# Patient Record
Sex: MALE | Race: WHITE | NOT HISPANIC OR LATINO | URBAN - METROPOLITAN AREA
[De-identification: names, ages, dates, MRNs, and addresses within clinical notes are randomized per-mention and may not be internally consistent; named-entity substitution may affect disease eponyms.]

---

## 2017-08-18 ENCOUNTER — INPATIENT (INPATIENT)
Facility: HOSPITAL | Age: 67
LOS: 1 days | Discharge: HOME | DRG: 101 | End: 2017-08-20
Attending: PSYCHIATRY & NEUROLOGY | Admitting: PSYCHIATRY & NEUROLOGY
Payer: MEDICARE

## 2017-08-18 VITALS
SYSTOLIC BLOOD PRESSURE: 194 MMHG | HEART RATE: 86 BPM | OXYGEN SATURATION: 89 % | RESPIRATION RATE: 20 BRPM | DIASTOLIC BLOOD PRESSURE: 100 MMHG

## 2017-08-18 DIAGNOSIS — G40.209 LOCALIZATION-RELATED (FOCAL) (PARTIAL) SYMPTOMATIC EPILEPSY AND EPILEPTIC SYNDROMES WITH COMPLEX PARTIAL SEIZURES, NOT INTRACTABLE, WITHOUT STATUS EPILEPTICUS: ICD-10-CM

## 2017-08-18 DIAGNOSIS — E78.4 OTHER HYPERLIPIDEMIA: ICD-10-CM

## 2017-08-18 DIAGNOSIS — I10 ESSENTIAL (PRIMARY) HYPERTENSION: ICD-10-CM

## 2017-08-18 DIAGNOSIS — R56.9 UNSPECIFIED CONVULSIONS: ICD-10-CM

## 2017-08-18 DIAGNOSIS — Z98.890 OTHER SPECIFIED POSTPROCEDURAL STATES: Chronic | ICD-10-CM

## 2017-08-18 LAB
ALBUMIN SERPL ELPH-MCNC: 4.4 G/DL — SIGNIFICANT CHANGE UP (ref 3.3–5)
ALP SERPL-CCNC: 51 U/L — SIGNIFICANT CHANGE UP (ref 40–120)
ALT FLD-CCNC: 19 U/L RC — SIGNIFICANT CHANGE UP (ref 10–45)
ANION GAP SERPL CALC-SCNC: 14 MMOL/L — SIGNIFICANT CHANGE UP (ref 5–17)
APPEARANCE UR: CLEAR — SIGNIFICANT CHANGE UP
APTT BLD: 25.9 SEC — LOW (ref 27.5–37.4)
AST SERPL-CCNC: 23 U/L — SIGNIFICANT CHANGE UP (ref 10–40)
BACTERIA # UR AUTO: NEGATIVE /HPF — SIGNIFICANT CHANGE UP
BASOPHILS # BLD AUTO: 0.1 K/UL — SIGNIFICANT CHANGE UP (ref 0–0.2)
BASOPHILS NFR BLD AUTO: 0.7 % — SIGNIFICANT CHANGE UP (ref 0–2)
BILIRUB SERPL-MCNC: 0.5 MG/DL — SIGNIFICANT CHANGE UP (ref 0.2–1.2)
BILIRUB UR-MCNC: NEGATIVE — SIGNIFICANT CHANGE UP
BLD GP AB SCN SERPL QL: NEGATIVE — SIGNIFICANT CHANGE UP
BUN SERPL-MCNC: 11 MG/DL — SIGNIFICANT CHANGE UP (ref 7–23)
CALCIUM SERPL-MCNC: 8.7 MG/DL — SIGNIFICANT CHANGE UP (ref 8.4–10.5)
CHLORIDE SERPL-SCNC: 101 MMOL/L — SIGNIFICANT CHANGE UP (ref 96–108)
CO2 SERPL-SCNC: 28 MMOL/L — SIGNIFICANT CHANGE UP (ref 22–31)
COLOR SPEC: SIGNIFICANT CHANGE UP
CREAT SERPL-MCNC: 1.01 MG/DL — SIGNIFICANT CHANGE UP (ref 0.5–1.3)
DIFF PNL FLD: ABNORMAL
EOSINOPHIL # BLD AUTO: 0 K/UL — SIGNIFICANT CHANGE UP (ref 0–0.5)
EOSINOPHIL NFR BLD AUTO: 0.3 % — SIGNIFICANT CHANGE UP (ref 0–6)
GAS PNL BLDV: SIGNIFICANT CHANGE UP
GLUCOSE SERPL-MCNC: 113 MG/DL — HIGH (ref 70–99)
GLUCOSE UR QL: NEGATIVE — SIGNIFICANT CHANGE UP
HCT VFR BLD CALC: 46.4 % — SIGNIFICANT CHANGE UP (ref 39–50)
HGB BLD-MCNC: 15.6 G/DL — SIGNIFICANT CHANGE UP (ref 13–17)
INR BLD: 1.01 RATIO — SIGNIFICANT CHANGE UP (ref 0.88–1.16)
KETONES UR-MCNC: NEGATIVE — SIGNIFICANT CHANGE UP
LEUKOCYTE ESTERASE UR-ACNC: NEGATIVE — SIGNIFICANT CHANGE UP
LYMPHOCYTES # BLD AUTO: 4.6 K/UL — HIGH (ref 1–3.3)
LYMPHOCYTES # BLD AUTO: 42.6 % — SIGNIFICANT CHANGE UP (ref 13–44)
MAGNESIUM SERPL-MCNC: 2 MG/DL — SIGNIFICANT CHANGE UP (ref 1.6–2.6)
MCHC RBC-ENTMCNC: 32.2 PG — SIGNIFICANT CHANGE UP (ref 27–34)
MCHC RBC-ENTMCNC: 33.7 GM/DL — SIGNIFICANT CHANGE UP (ref 32–36)
MCV RBC AUTO: 95.6 FL — SIGNIFICANT CHANGE UP (ref 80–100)
MONOCYTES # BLD AUTO: 0.9 K/UL — SIGNIFICANT CHANGE UP (ref 0–0.9)
MONOCYTES NFR BLD AUTO: 8.3 % — SIGNIFICANT CHANGE UP (ref 2–14)
NEUTROPHILS # BLD AUTO: 5.2 K/UL — SIGNIFICANT CHANGE UP (ref 1.8–7.4)
NEUTROPHILS NFR BLD AUTO: 48.1 % — SIGNIFICANT CHANGE UP (ref 43–77)
NITRITE UR-MCNC: NEGATIVE — SIGNIFICANT CHANGE UP
PH UR: 7 — SIGNIFICANT CHANGE UP (ref 5–8)
PHOSPHATE SERPL-MCNC: 3.7 MG/DL — SIGNIFICANT CHANGE UP (ref 2.5–4.5)
PLATELET # BLD AUTO: 165 K/UL — SIGNIFICANT CHANGE UP (ref 150–400)
POTASSIUM SERPL-MCNC: 4.3 MMOL/L — SIGNIFICANT CHANGE UP (ref 3.5–5.3)
POTASSIUM SERPL-SCNC: 4.3 MMOL/L — SIGNIFICANT CHANGE UP (ref 3.5–5.3)
PROT SERPL-MCNC: 7.2 G/DL — SIGNIFICANT CHANGE UP (ref 6–8.3)
PROT UR-MCNC: 30 MG/DL
PROTHROM AB SERPL-ACNC: 11 SEC — SIGNIFICANT CHANGE UP (ref 9.8–12.7)
RBC # BLD: 4.85 M/UL — SIGNIFICANT CHANGE UP (ref 4.2–5.8)
RBC # FLD: 12 % — SIGNIFICANT CHANGE UP (ref 10.3–14.5)
RBC CASTS # UR COMP ASSIST: SIGNIFICANT CHANGE UP /HPF (ref 0–2)
RH IG SCN BLD-IMP: POSITIVE — SIGNIFICANT CHANGE UP
SODIUM SERPL-SCNC: 143 MMOL/L — SIGNIFICANT CHANGE UP (ref 135–145)
SP GR SPEC: 1.01 — LOW (ref 1.01–1.02)
UROBILINOGEN FLD QL: NEGATIVE — SIGNIFICANT CHANGE UP
VALPROATE SERPL-MCNC: 79 UG/ML — SIGNIFICANT CHANGE UP (ref 50–100)
WBC # BLD: 10.7 K/UL — HIGH (ref 3.8–10.5)
WBC # FLD AUTO: 10.7 K/UL — HIGH (ref 3.8–10.5)
WBC UR QL: SIGNIFICANT CHANGE UP /HPF (ref 0–5)

## 2017-08-18 PROCEDURE — 99284 EMERGENCY DEPT VISIT MOD MDM: CPT | Mod: GC

## 2017-08-18 PROCEDURE — 71010: CPT | Mod: 26

## 2017-08-18 PROCEDURE — 70450 CT HEAD/BRAIN W/O DYE: CPT | Mod: 26

## 2017-08-18 RX ORDER — DIVALPROEX SODIUM 500 MG/1
750 TABLET, DELAYED RELEASE ORAL AT BEDTIME
Qty: 0 | Refills: 0 | Status: DISCONTINUED | OUTPATIENT
Start: 2017-08-18 | End: 2017-08-20

## 2017-08-18 RX ORDER — SENNA PLUS 8.6 MG/1
2 TABLET ORAL AT BEDTIME
Qty: 0 | Refills: 0 | Status: DISCONTINUED | OUTPATIENT
Start: 2017-08-18 | End: 2017-08-20

## 2017-08-18 RX ORDER — ONDANSETRON 8 MG/1
4 TABLET, FILM COATED ORAL ONCE
Qty: 0 | Refills: 0 | Status: COMPLETED | OUTPATIENT
Start: 2017-08-18 | End: 2017-08-18

## 2017-08-18 RX ORDER — ENOXAPARIN SODIUM 100 MG/ML
40 INJECTION SUBCUTANEOUS EVERY 24 HOURS
Qty: 0 | Refills: 0 | Status: DISCONTINUED | OUTPATIENT
Start: 2017-08-18 | End: 2017-08-20

## 2017-08-18 RX ORDER — LEVETIRACETAM 250 MG/1
1000 TABLET, FILM COATED ORAL ONCE
Qty: 0 | Refills: 0 | Status: COMPLETED | OUTPATIENT
Start: 2017-08-18 | End: 2017-08-18

## 2017-08-18 RX ORDER — FINASTERIDE 5 MG/1
5 TABLET, FILM COATED ORAL DAILY
Qty: 0 | Refills: 0 | Status: DISCONTINUED | OUTPATIENT
Start: 2017-08-18 | End: 2017-08-20

## 2017-08-18 RX ORDER — LEVETIRACETAM 250 MG/1
1000 TABLET, FILM COATED ORAL
Qty: 0 | Refills: 0 | Status: DISCONTINUED | OUTPATIENT
Start: 2017-08-18 | End: 2017-08-20

## 2017-08-18 RX ORDER — LOSARTAN POTASSIUM 100 MG/1
50 TABLET, FILM COATED ORAL DAILY
Qty: 0 | Refills: 0 | Status: DISCONTINUED | OUTPATIENT
Start: 2017-08-18 | End: 2017-08-20

## 2017-08-18 RX ORDER — DIVALPROEX SODIUM 500 MG/1
500 TABLET, DELAYED RELEASE ORAL DAILY
Qty: 0 | Refills: 0 | Status: DISCONTINUED | OUTPATIENT
Start: 2017-08-18 | End: 2017-08-20

## 2017-08-18 RX ORDER — ONDANSETRON 8 MG/1
4 TABLET, FILM COATED ORAL EVERY 6 HOURS
Qty: 0 | Refills: 0 | Status: DISCONTINUED | OUTPATIENT
Start: 2017-08-18 | End: 2017-08-20

## 2017-08-18 RX ORDER — SIMVASTATIN 20 MG/1
10 TABLET, FILM COATED ORAL AT BEDTIME
Qty: 0 | Refills: 0 | Status: DISCONTINUED | OUTPATIENT
Start: 2017-08-18 | End: 2017-08-20

## 2017-08-18 RX ORDER — DOCUSATE SODIUM 100 MG
100 CAPSULE ORAL THREE TIMES A DAY
Qty: 0 | Refills: 0 | Status: DISCONTINUED | OUTPATIENT
Start: 2017-08-18 | End: 2017-08-20

## 2017-08-18 RX ORDER — ACETAMINOPHEN 500 MG
1000 TABLET ORAL ONCE
Qty: 0 | Refills: 0 | Status: COMPLETED | OUTPATIENT
Start: 2017-08-18 | End: 2017-08-18

## 2017-08-18 RX ORDER — DIVALPROEX SODIUM 500 MG/1
1 TABLET, DELAYED RELEASE ORAL
Qty: 0 | Refills: 0 | COMMUNITY

## 2017-08-18 RX ORDER — IBUPROFEN 200 MG
400 TABLET ORAL EVERY 6 HOURS
Qty: 0 | Refills: 0 | Status: DISCONTINUED | OUTPATIENT
Start: 2017-08-18 | End: 2017-08-20

## 2017-08-18 RX ORDER — ESCITALOPRAM OXALATE 10 MG/1
10 TABLET, FILM COATED ORAL DAILY
Qty: 0 | Refills: 0 | Status: DISCONTINUED | OUTPATIENT
Start: 2017-08-18 | End: 2017-08-19

## 2017-08-18 RX ADMIN — LEVETIRACETAM 1000 MILLIGRAM(S): 250 TABLET, FILM COATED ORAL at 22:02

## 2017-08-18 RX ADMIN — LEVETIRACETAM 400 MILLIGRAM(S): 250 TABLET, FILM COATED ORAL at 11:15

## 2017-08-18 RX ADMIN — Medication 400 MILLIGRAM(S): at 14:21

## 2017-08-18 RX ADMIN — DIVALPROEX SODIUM 750 MILLIGRAM(S): 500 TABLET, DELAYED RELEASE ORAL at 22:02

## 2017-08-18 RX ADMIN — SIMVASTATIN 10 MILLIGRAM(S): 20 TABLET, FILM COATED ORAL at 22:03

## 2017-08-18 RX ADMIN — Medication 100 MILLIGRAM(S): at 22:00

## 2017-08-18 RX ADMIN — ENOXAPARIN SODIUM 40 MILLIGRAM(S): 100 INJECTION SUBCUTANEOUS at 21:59

## 2017-08-18 RX ADMIN — ONDANSETRON 4 MILLIGRAM(S): 8 TABLET, FILM COATED ORAL at 11:36

## 2017-08-18 NOTE — ED PROVIDER NOTE - ATTENDING CONTRIBUTION TO CARE
Patient with history of resection of brain tumor, history of seizures with resultant aphasia and confusion presenting with episode of confusion and aphasia beginning suddenly earlier today.  Neurology [facility].  On exam patient able to follow simple commands, aphasic.   Hypertensive but otherwise vital signs within normal limits.  Likely recurrence of his prior atypical seizures, however given focal neurologic signs within tPA window, code stroke activated.  Patient admitted to neurology for further care.

## 2017-08-18 NOTE — H&P ADULT - PROBLEM SELECTOR PROBLEM 2
Partial symptomatic epilepsy with complex partial seizures, not intractable, without status epilepticus

## 2017-08-18 NOTE — H&P ADULT - NSHPLABSRESULTS_GEN_ALL_CORE
< from: CT Head No Cont (08.18.17 @ 11:32) >    INTERPRETATION:    Clinical Indication: Stroke code, history of cavernoma with seizures   consisting of periodic right-sided weakness    5mm axial sections of the brain were obtained from base to vertex,   without the intravenous administration of contrast material. Coronal and   sagittal computer generated reconstructed views are available.    No prior brain imaging is available for comparison.    There has been a left frontal temporal craniotomy. There is   encephalomalacia in the left temporal lobe. The ventricles are enlarged   to a moderate degree and may be somewhat out of proportion to the degree   of sulcal prominence. No hemorrhage or mass is identified.    Impression: Previous left frontal craniotomy with left temporal and   encephalomalacia. Large ventricles. No acute hemorrhage or mass.      < end of copied text >

## 2017-08-18 NOTE — ED PROVIDER NOTE - OBJECTIVE STATEMENT
66yo male p/w altered mental status. Pt. has history of meningioma, s/p; resection 2014 on seizure ppx. 2015 pt. had grand mal seizure after medication noncompliance. Pt. had Torrey's events. Today, while in kitchen pt. started having aphasia, was given lorazepam, 1mg. Patient now with persistent aphasia, confusion. Deny fevers, nausea, trauma, cough, sick contacts, recent travel. Pt. reports vomiting this AM. PMD: Dr. Joel Goldberg Neuro: Kyle

## 2017-08-18 NOTE — ED ADULT NURSE NOTE - OBJECTIVE STATEMENT
Pt is a 66 yo male BIBA sp AMS since 9 am. Pts wife states he has a hx of meningioma s/p resection 2014 on seizures. Pt had grand mal seizure  after being non-compliant on meds. Pts wife reports this is his usual self after having a seizure, he typically has seizures once a year. Pt was given 1 mg of lorazepam at home after becoming aphasic. Pt has persistent aphasia, confusion, He is unable to answer any questions.

## 2017-08-18 NOTE — H&P ADULT - HISTORY OF PRESENT ILLNESS
Pt is a 66 yo right-handed male with a PMHx of cavernoma, s/p removal in 2014 with resultant epilepsy who presents w/ altered mental status. Pt is currently non-verbal with weakness of the right side, inability to express himself and urinary incontinence during this episode.Earlier this morning, sudden-onset of aphasia and was given lorazepam 1mg but still had a persistent expressive aphasia with confusion. Pt's wife reports that is typical for him to have seizure episodes that consist of confusion and hemiparesis on the right side that resolve shortly during the post-ictal period.   He has had 4 seizures since diagnosed with his cavernoma in 2014 with his last seizure being in October. He is currently compliant with his home medications which consist of Keppra 1000/1000 and Depakote 500/750.

## 2017-08-18 NOTE — H&P ADULT - NSHPPHYSICALEXAM_GEN_ALL_CORE
Physical exam:   General: in no acute distress  Mental Status: Alert, predominantly expressive aphasia but able to follow commands, not able to stare orientation  CN: bilateral blink to threat, right pupil 1.5mm, left pupil 2 mm and reactive, partial gaze preference to right, No facial asymmetries   Motor: Normal bulk and tone. No adventitious movements or abnormal posturing. Full strength on the left side but 1/5 on the RLE, and 3/5 on RLE without drift  Sensation: Grimace to pain in all 4 extremities

## 2017-08-18 NOTE — H&P ADULT - ASSESSMENT
67 year old male with seizure s/p cavernoma resected previously well-controlled on keppra and depakote with episode of aphasia and right-sided weakness earlier today similar to prior seizures who has still not returned to baseline and who does not appear to have an infection.

## 2017-08-18 NOTE — H&P ADULT - PMH
Cavernoma    Essential hypertension    Other hyperlipidemia    Partial symptomatic epilepsy with complex partial seizures, not intractable, without status epilepticus

## 2017-08-18 NOTE — H&P ADULT - PROBLEM SELECTOR PLAN 1
Admit to neurology with Q4 hour neuro checks.  Obtain dysphagia screen prior to feeding.  MRI brain without contrast.  Continue keppra and depakote. Will give IV if unable to tolerate PO.

## 2017-08-19 DIAGNOSIS — D18.01 HEMANGIOMA OF SKIN AND SUBCUTANEOUS TISSUE: ICD-10-CM

## 2017-08-19 PROCEDURE — 70551 MRI BRAIN STEM W/O DYE: CPT | Mod: 26

## 2017-08-19 RX ADMIN — Medication 100 MILLIGRAM(S): at 15:40

## 2017-08-19 RX ADMIN — DIVALPROEX SODIUM 500 MILLIGRAM(S): 500 TABLET, DELAYED RELEASE ORAL at 10:00

## 2017-08-19 RX ADMIN — LEVETIRACETAM 1000 MILLIGRAM(S): 250 TABLET, FILM COATED ORAL at 10:00

## 2017-08-19 RX ADMIN — LEVETIRACETAM 1000 MILLIGRAM(S): 250 TABLET, FILM COATED ORAL at 21:42

## 2017-08-19 RX ADMIN — ENOXAPARIN SODIUM 40 MILLIGRAM(S): 100 INJECTION SUBCUTANEOUS at 21:38

## 2017-08-19 RX ADMIN — Medication 100 MILLIGRAM(S): at 10:00

## 2017-08-19 RX ADMIN — Medication 1 MILLIGRAM(S): at 13:42

## 2017-08-19 RX ADMIN — DIVALPROEX SODIUM 750 MILLIGRAM(S): 500 TABLET, DELAYED RELEASE ORAL at 21:42

## 2017-08-19 RX ADMIN — FINASTERIDE 5 MILLIGRAM(S): 5 TABLET, FILM COATED ORAL at 10:00

## 2017-08-19 RX ADMIN — Medication 100 MILLIGRAM(S): at 21:38

## 2017-08-19 RX ADMIN — LOSARTAN POTASSIUM 50 MILLIGRAM(S): 100 TABLET, FILM COATED ORAL at 10:00

## 2017-08-19 RX ADMIN — SIMVASTATIN 10 MILLIGRAM(S): 20 TABLET, FILM COATED ORAL at 21:39

## 2017-08-19 NOTE — CONSULT NOTE ADULT - SUBJECTIVE AND OBJECTIVE BOX
HPI:  Pt is a 66 yo right-handed male with a PMHx of cavernoma, s/p removal in  with resultant epilepsy who presents w/ altered mental status. Pt is currently minimally verbal with weakness of the right side, inability to express himself and urinary incontinence during this episode. Earlier yesterday morning, sudden-onset of aphasia and was given lorazepam 1mg but still had a persistent expressive aphasia with confusion. Pt's wife reports that is typical for him to have seizure episodes that consist of confusion and hemiparesis on the right side that resolve shortly during the post-ictal period.   He has had 4 seizures since diagnosed with his cavernoma in  with his last seizure being in October. He is currently compliant with his home medications which consist of Keppra 1000/1000 and Depakote 500/750. (18 Aug 2017 13:59)  per wife pt has not had fever , chills, chest pain, sob, nausea, vomiting, abd pain, diarrhea or dysuria.      PAST MEDICAL & SURGICAL HISTORY:  Partial symptomatic epilepsy with complex partial seizures, not intractable, without status epilepticus  Other hyperlipidemia  Essential hypertension  Cavernoma  Status post craniectomy      Review of Systems:   CONSTITUTIONAL: No fever, weight loss, or fatigue  EYES: No eye pain, visual disturbances, or discharge  ENMT:  No difficulty hearing, tinnitus, vertigo; No sinus or throat pain  NECK: No pain or stiffness  BREASTS: No pain, masses, or nipple discharge  RESPIRATORY: No cough, wheezing, chills or hemoptysis; No shortness of breath  CARDIOVASCULAR: No chest pain, palpitations, dizziness, or leg swelling  GASTROINTESTINAL: No abdominal or epigastric pain. No nausea, vomiting, or hematemesis; No diarrhea or constipation. No melena or hematochezia.  GENITOURINARY: No dysuria, frequency, hematuria, or incontinence  NEUROLOGICAL: No headaches, memory loss, loss of strength, numbness, or tremors  SKIN: No itching, burning, rashes, or lesions   LYMPH NODES: No enlarged glands  ENDOCRINE: No heat or cold intolerance; No hair loss  MUSCULOSKELETAL: No joint pain or swelling; No muscle, back, or extremity pain  PSYCHIATRIC: No depression, anxiety, mood swings, or difficulty sleeping  HEME/LYMPH: No easy bruising, or bleeding gums  ALLERY AND IMMUNOLOGIC: No hives or eczema    Allergies    No Known Allergies    Intolerances        Social History:     FAMILY HISTORY:  No pertinent family history in first degree relatives      MEDICATIONS  (STANDING):  enoxaparin Injectable 40 milliGRAM(s) SubCutaneous every 24 hours  docusate sodium 100 milliGRAM(s) Oral three times a day  levETIRAcetam 1000 milliGRAM(s) Oral two times a day  losartan 50 milliGRAM(s) Oral daily  finasteride 5 milliGRAM(s) Oral daily  diVALproex  milliGRAM(s) Oral daily  diVALproex  milliGRAM(s) Oral at bedtime  escitalopram 10 milliGRAM(s) Oral daily  simvastatin 10 milliGRAM(s) Oral at bedtime    MEDICATIONS  (PRN):  LORazepam   Injectable 1 milliGRAM(s) IV Push once PRN Seizure activity lasting greater than 3 minutes  ibuprofen  Tablet 400 milliGRAM(s) Oral every 6 hours PRN Pain  ondansetron Injectable 4 milliGRAM(s) IV Push every 6 hours PRN Nausea  senna 2 Tablet(s) Oral at bedtime PRN Constipation      Vital Signs Last 24 Hrs  T(C): 37.2 (19 Aug 2017 07:34), Max: 37.8 (18 Aug 2017 13:54)  T(F): 98.9 (19 Aug 2017 07:34), Max: 100 (18 Aug 2017 13:54)  HR: 58 (19 Aug 2017 07:34) (58 - 86)  BP: 140/69 (19 Aug 2017 07:34) (130/79 - 194/100)  BP(mean): --  RR: 18 (19 Aug 2017 07:34) (17 - 20)  SpO2: 100% (19 Aug 2017 07:34) (89% - 100%)  CAPILLARY BLOOD GLUCOSE  108 (18 Aug 2017 10:32)        I&O's Summary    18 Aug 2017 07:01  -  19 Aug 2017 07:00  --------------------------------------------------------  IN: 0 mL / OUT: 400 mL / NET: -400 mL        PHYSICAL EXAM:  GENERAL: NAD, well-developed  HEAD:  Atraumatic, Normocephalic  EYES: EOMI, PERRLA, conjunctiva and sclera clear  NECK: Supple, No JVD  CHEST/LUNG: Clear to auscultation bilaterally; No wheeze  HEART: Regular rate and rhythm; No murmurs, rubs, or gallops  ABDOMEN: Soft, Nontender, Nondistended; Bowel sounds present  EXTREMITIES:  2+ Peripheral Pulses, No clubbing, cyanosis, or edema  PSYCH: AAOx3  NEUROLOGY: A&O x 0, answers to all questions" i'm just tired", rt sided weakness  SKIN: No rashes or lesions    LABS:                        15.6   10.7  )-----------( 165      ( 18 Aug 2017 10:42 )             46.4     -    143  |  101  |  11  ----------------------------<  113<H>  4.3   |  28  |  1.01    Ca    8.7      18 Aug 2017 10:42  Phos  3.7       Mg     2.0         TPro  7.2  /  Alb  4.4  /  TBili  0.5  /  DBili  x   /  AST  23  /  ALT  19  /  AlkPhos  51      PT/INR - ( 18 Aug 2017 10:42 )   PT: 11.0 sec;   INR: 1.01 ratio         PTT - ( 18 Aug 2017 10:42 )  PTT:25.9 sec      Urinalysis Basic - ( 18 Aug 2017 13:57 )    Color: x / Appearance: Clear / S.009 / pH: x  Gluc: x / Ketone: Negative  / Bili: Negative / Urobili: Negative   Blood: x / Protein: 30 mg/dL / Nitrite: Negative   Leuk Esterase: Negative / RBC: 3-5 /HPF / WBC 0-2 /HPF   Sq Epi: x / Non Sq Epi: x / Bacteria: Negative /HPF        RADIOLOGY & ADDITIONAL TESTS:    Imaging Personally Reviewed:< from: CT Head No Cont (17 @ 11:32) >  Impression: Previous left frontal craniotomy with left temporal and   encephalomalacia. Large ventricles. No acute hemorrhage or mass.    < end of copied text >  < from: Xray Chest 1 View AP- PORTABLE-Urgent (17 @ 11:02) >  IMPRESSION: Clear lungs.    < end of copied text >      Consultant(s) Notes Reviewed:      Care Discussed with Consultants/Other Providers:

## 2017-08-19 NOTE — PROGRESS NOTE ADULT - PROBLEM SELECTOR PLAN 1
Continue Keppra  Continue Depakote  Check Levels  Start Fycompa 2mg if availible  MRI Brain  EEG  Medicine eval with Dr. Fernando Continue Keppra  Continue Depakote  Check Levels  Repeat CBC  Start Fycompa 2mg if availible  MRI Brain  EEG  Medicine eval with Dr. Fernando

## 2017-08-19 NOTE — PROGRESS NOTE ADULT - SUBJECTIVE AND OBJECTIVE BOX
Patient seen and examined at bedside.  Resident note reviewed.  Patient speech gradullay improving    Vital Signs Last 24 Hrs  T(C): 37.2 (19 Aug 2017 07:34), Max: 37.8 (18 Aug 2017 13:54)  T(F): 98.9 (19 Aug 2017 07:34), Max: 100 (18 Aug 2017 13:54)  HR: 63 (19 Aug 2017 09:59) (58 - 86)  BP: 142/79 (19 Aug 2017 09:59) (130/79 - 194/100)  BP(mean): --  RR: 18 (19 Aug 2017 07:34) (17 - 20)  SpO2: 100% (19 Aug 2017 07:34) (89% - 100%)    Neuro exam   Predominantly Motor Aphasia  Neuro exam otherwise non focal

## 2017-08-19 NOTE — CONSULT NOTE ADULT - ASSESSMENT
67 year old male with seizure s/p cavernoma resected previously well-controlled on keppra and depakote with episode of aphasia and right-sided weakness similar to prior seizures who has still not returned to baseline and who does not appear to have an infection.     seizure management as per neurology- cw keppra and depakote  htn- cw cozaar  hld- cw zocor  bph- cw finasteride  depression- cw lexapro  dvt px- cw lovenox    thank you for this interesting consult.  will follow with you.

## 2017-08-20 ENCOUNTER — TRANSCRIPTION ENCOUNTER (OUTPATIENT)
Age: 67
End: 2017-08-20

## 2017-08-20 VITALS
TEMPERATURE: 99 F | OXYGEN SATURATION: 96 % | HEART RATE: 63 BPM | DIASTOLIC BLOOD PRESSURE: 79 MMHG | SYSTOLIC BLOOD PRESSURE: 146 MMHG | RESPIRATION RATE: 18 BRPM

## 2017-08-20 LAB
CULTURE RESULTS: NO GROWTH — SIGNIFICANT CHANGE UP
SPECIMEN SOURCE: SIGNIFICANT CHANGE UP

## 2017-08-20 RX ORDER — DIVALPROEX SODIUM 500 MG/1
3 TABLET, DELAYED RELEASE ORAL
Qty: 0 | Refills: 0 | COMMUNITY

## 2017-08-20 RX ORDER — DIVALPROEX SODIUM 500 MG/1
1 TABLET, DELAYED RELEASE ORAL
Qty: 0 | Refills: 0 | DISCHARGE
Start: 2017-08-20

## 2017-08-20 RX ORDER — DIVALPROEX SODIUM 500 MG/1
3 TABLET, DELAYED RELEASE ORAL
Qty: 0 | Refills: 0 | DISCHARGE
Start: 2017-08-20

## 2017-08-20 RX ORDER — LEVETIRACETAM 250 MG/1
1 TABLET, FILM COATED ORAL
Qty: 0 | Refills: 0 | DISCHARGE
Start: 2017-08-20

## 2017-08-20 RX ORDER — DIVALPROEX SODIUM 500 MG/1
1 TABLET, DELAYED RELEASE ORAL
Qty: 0 | Refills: 0 | COMMUNITY

## 2017-08-20 RX ORDER — LEVETIRACETAM 250 MG/1
1 TABLET, FILM COATED ORAL
Qty: 0 | Refills: 0 | COMMUNITY

## 2017-08-20 RX ADMIN — LOSARTAN POTASSIUM 50 MILLIGRAM(S): 100 TABLET, FILM COATED ORAL at 10:06

## 2017-08-20 RX ADMIN — FINASTERIDE 5 MILLIGRAM(S): 5 TABLET, FILM COATED ORAL at 10:06

## 2017-08-20 RX ADMIN — DIVALPROEX SODIUM 500 MILLIGRAM(S): 500 TABLET, DELAYED RELEASE ORAL at 10:06

## 2017-08-20 RX ADMIN — LEVETIRACETAM 1000 MILLIGRAM(S): 250 TABLET, FILM COATED ORAL at 10:06

## 2017-08-20 NOTE — DISCHARGE NOTE ADULT - MEDICATION SUMMARY - MEDICATIONS TO TAKE
I will START or STAY ON the medications listed below when I get home from the hospital:    finasteride 5 mg oral tablet  -- 1 tab(s) by mouth once a day  -- Indication: For BPH    losartan 50 mg oral tablet  -- 1 tab(s) by mouth once a day  -- Indication: For HTN    LORazepam 1 mg oral tablet  -- 1 tab(s) by mouth once a day, As Needed for seizure  -- Indication: For Seizure    divalproex sodium 500 mg oral delayed release tablet  -- 1 tab(s) by mouth once a day  -- Indication: For Seizure    divalproex sodium 250 mg oral delayed release tablet  -- 3 tab(s) by mouth once a day (at bedtime)  -- Indication: For Seizure    levETIRAcetam 1000 mg oral tablet  -- 1 tab(s) by mouth 2 times a day  -- Indication: For Seizure    Lexapro 10 mg oral tablet  -- 1 tab(s) by mouth once a day  -- Indication: For antidepressant    simvastatin 10 mg oral tablet  -- 1 tab(s) by mouth once a day (at bedtime)  -- Indication: For HLD

## 2017-08-20 NOTE — PROGRESS NOTE ADULT - PROBLEM SELECTOR PLAN 2
Continue Keppra  Continue Depakote  Start Fycompa if able  If  MRI shows no acute then consider D/C and follow with [facility] as utpt

## 2017-08-20 NOTE — DISCHARGE NOTE ADULT - CARE PROVIDER_API CALL
Christine Urrutia), Neurology  [address] [place] [address]  Phone: [phone]  Fax: [phone]

## 2017-08-20 NOTE — DISCHARGE NOTE ADULT - PLAN OF CARE
prevention of further seizures Follow up with Dr. Camargo after discharge. Plan is to start Fycompa as outpatient.

## 2017-08-20 NOTE — PROGRESS NOTE ADULT - ASSESSMENT
67 year old male with seizure s/p cavernoma resected previously well-controlled on keppra and depakote with episode of aphasia and right-sided weakness similar to prior seizures who has still not returned to baseline and who does not appear to have an infection.     seizure management as per neurology- cw keppra and depakote  AMS- check b12, folate , tsh, check labs, no evidence of infection  htn- cw cozaar  hld- cw zocor  bph- cw finasteride  depression- cw lexapro  dvt px- cw lovenox  pt eval.

## 2017-08-20 NOTE — DISCHARGE NOTE ADULT - CARE PLAN
Principal Discharge DX:	Partial symptomatic epilepsy with complex partial seizures, not intractable, without status epilepticus  Goal:	prevention of further seizures  Instructions for follow-up, activity and diet:	Follow up with Dr. Camargo after discharge. Plan is to start Fycompa as outpatient.

## 2017-08-20 NOTE — PROGRESS NOTE ADULT - SUBJECTIVE AND OBJECTIVE BOX
Patient is a 67y old  Male who presents with a chief complaint of aphasia (18 Aug 2017 13:59)      SUBJECTIVE / OVERNIGHT EVENTS: no complaints, A&O x 1 only.     MEDICATIONS  (STANDING):  enoxaparin Injectable 40 milliGRAM(s) SubCutaneous every 24 hours  docusate sodium 100 milliGRAM(s) Oral three times a day  levETIRAcetam 1000 milliGRAM(s) Oral two times a day  losartan 50 milliGRAM(s) Oral daily  finasteride 5 milliGRAM(s) Oral daily  diVALproex  milliGRAM(s) Oral daily  diVALproex  milliGRAM(s) Oral at bedtime  simvastatin 10 milliGRAM(s) Oral at bedtime    MEDICATIONS  (PRN):  LORazepam   Injectable 1 milliGRAM(s) IV Push once PRN Seizure activity lasting greater than 3 minutes  ibuprofen  Tablet 400 milliGRAM(s) Oral every 6 hours PRN Pain  ondansetron Injectable 4 milliGRAM(s) IV Push every 6 hours PRN Nausea  senna 2 Tablet(s) Oral at bedtime PRN Constipation      Vital Signs Last 24 Hrs  T(C): 36.8 (20 Aug 2017 07:50), Max: 37.1 (19 Aug 2017 21:28)  T(F): 98.2 (20 Aug 2017 07:50), Max: 98.7 (19 Aug 2017 21:28)  HR: 65 (20 Aug 2017 10:03) (57 - 66)  BP: 144/74 (20 Aug 2017 10:03) (139/83 - 167/94)  BP(mean): --  RR: 18 (20 Aug 2017 07:50) (17 - 19)  SpO2: 97% (20 Aug 2017 07:50) (97% - 99%)  CAPILLARY BLOOD GLUCOSE        I&O's Summary    19 Aug 2017 07:  -  20 Aug 2017 07:00  --------------------------------------------------------  IN: 0 mL / OUT: 400 mL / NET: -400 mL    20 Aug 2017 07:01  -  20 Aug 2017 10:21  --------------------------------------------------------  IN: 240 mL / OUT: 0 mL / NET: 240 mL        PHYSICAL EXAM:  GENERAL: NAD, well-developed  HEAD:  Atraumatic, Normocephalic  EYES: EOMI, PERRLA, conjunctiva and sclera clear  NECK: Supple, No JVD  CHEST/LUNG: Clear to auscultation bilaterally; No wheeze  HEART: Regular rate and rhythm; No murmurs, rubs, or gallops  ABDOMEN: Soft, Nontender, Nondistended; Bowel sounds present  EXTREMITIES:  2+ Peripheral Pulses, No clubbing, cyanosis, or edema  PSYCH: AAOx1  NEUROLOGY: non-focal  SKIN: No rashes or lesions    LABS:                        15.6   10.7  )-----------( 165      ( 18 Aug 2017 10:42 )             46.4     08-18    143  |  101  |  11  ----------------------------<  113<H>  4.3   |  28  |  1.01    Ca    8.7      18 Aug 2017 10:42  Phos  3.7     08-18  Mg     2.0     08-18    TPro  7.2  /  Alb  4.4  /  TBili  0.5  /  DBili  x   /  AST  23  /  ALT  19  /  AlkPhos  51  08-18    PT/INR - ( 18 Aug 2017 10:42 )   PT: 11.0 sec;   INR: 1.01 ratio         PTT - ( 18 Aug 2017 10:42 )  PTT:25.9 sec      Urinalysis Basic - ( 18 Aug 2017 13:57 )    Color: x / Appearance: Clear / S.009 / pH: x  Gluc: x / Ketone: Negative  / Bili: Negative / Urobili: Negative   Blood: x / Protein: 30 mg/dL / Nitrite: Negative   Leuk Esterase: Negative / RBC: 3-5 /HPF / WBC 0-2 /HPF   Sq Epi: x / Non Sq Epi: x / Bacteria: Negative /HPF        RADIOLOGY & ADDITIONAL TESTS:    Imaging Personally Reviewed:    Consultant(s) Notes Reviewed:      Care Discussed with Consultants/Other Providers:

## 2017-08-20 NOTE — DISCHARGE NOTE ADULT - HOSPITAL COURSE
Pt is a 66 yo right-handed male with a PMHx of cavernoma, s/p removal in 2014 with resultant epilepsy who presents w/ altered mental status/ Code stroke called in ED for aphasia and right sided weakness. He was non-verbal with weakness of the right side, inability to express himself and urinary incontinence during this episode. Earlier that morning he had sudden-onset of aphasia and was given lorazepam 1mg but still had a persistent expressive aphasia with confusion. Pt's wife reports that is typical for him to have seizure episodes that consist of confusion and hemiparesis on the right side that resolve shortly during the post-ictal period.   He has had 4 seizures since diagnosed with his cavernoma in 2014 with his last seizure being in October. He is currently compliant with his home medications which consist of Keppra 1000/1000 and Depakote 500/750.     Pt admitted to evaluate for seizure and rule out stroke. Pt returned to baseline. MRI was negative for any acute pathology. Pt discharged in stable condition. Plan is to follow up with Dr. Urrutia and start Fycompa as outpatient.

## 2017-08-23 LAB
CULTURE RESULTS: SIGNIFICANT CHANGE UP
SPECIMEN SOURCE: SIGNIFICANT CHANGE UP

## 2017-10-19 PROCEDURE — 82962 GLUCOSE BLOOD TEST: CPT

## 2017-10-19 PROCEDURE — 82803 BLOOD GASES ANY COMBINATION: CPT

## 2017-10-19 PROCEDURE — 99285 EMERGENCY DEPT VISIT HI MDM: CPT | Mod: 25

## 2017-10-19 PROCEDURE — 82435 ASSAY OF BLOOD CHLORIDE: CPT

## 2017-10-19 PROCEDURE — 85027 COMPLETE CBC AUTOMATED: CPT

## 2017-10-19 PROCEDURE — 86900 BLOOD TYPING SEROLOGIC ABO: CPT

## 2017-10-19 PROCEDURE — 85730 THROMBOPLASTIN TIME PARTIAL: CPT

## 2017-10-19 PROCEDURE — 86850 RBC ANTIBODY SCREEN: CPT

## 2017-10-19 PROCEDURE — 87086 URINE CULTURE/COLONY COUNT: CPT

## 2017-10-19 PROCEDURE — 70551 MRI BRAIN STEM W/O DYE: CPT

## 2017-10-19 PROCEDURE — 85610 PROTHROMBIN TIME: CPT

## 2017-10-19 PROCEDURE — 70450 CT HEAD/BRAIN W/O DYE: CPT

## 2017-10-19 PROCEDURE — 83735 ASSAY OF MAGNESIUM: CPT

## 2017-10-19 PROCEDURE — 82565 ASSAY OF CREATININE: CPT

## 2017-10-19 PROCEDURE — 87040 BLOOD CULTURE FOR BACTERIA: CPT

## 2017-10-19 PROCEDURE — 84132 ASSAY OF SERUM POTASSIUM: CPT

## 2017-10-19 PROCEDURE — 80164 ASSAY DIPROPYLACETIC ACD TOT: CPT

## 2017-10-19 PROCEDURE — 82330 ASSAY OF CALCIUM: CPT

## 2017-10-19 PROCEDURE — 82947 ASSAY GLUCOSE BLOOD QUANT: CPT

## 2017-10-19 PROCEDURE — 83605 ASSAY OF LACTIC ACID: CPT

## 2017-10-19 PROCEDURE — 84295 ASSAY OF SERUM SODIUM: CPT

## 2017-10-19 PROCEDURE — 80053 COMPREHEN METABOLIC PANEL: CPT

## 2017-10-19 PROCEDURE — 71045 X-RAY EXAM CHEST 1 VIEW: CPT

## 2017-10-19 PROCEDURE — 96374 THER/PROPH/DIAG INJ IV PUSH: CPT

## 2017-10-19 PROCEDURE — 86901 BLOOD TYPING SEROLOGIC RH(D): CPT

## 2017-10-19 PROCEDURE — 96375 TX/PRO/DX INJ NEW DRUG ADDON: CPT

## 2017-10-19 PROCEDURE — 85014 HEMATOCRIT: CPT

## 2017-10-19 PROCEDURE — 81001 URINALYSIS AUTO W/SCOPE: CPT

## 2017-10-19 PROCEDURE — 84100 ASSAY OF PHOSPHORUS: CPT

## 2018-03-22 NOTE — ED PROVIDER NOTE - NS ED MD DISPO ISOLATION TYPES
2018  EMPLOYEE INFORMATION: EMPLOYER INFORMATION:   NAME: Amanda MEMBRENO [employer]   : 1977 [phone]   DATE OF INJURY/EVENT: 3/22/2018           Location: [facility]   Treating Provider: LOUIS Cabrera  Time In:  11:34 AM Time Out:  12:58 PM      DIAGNOSIS:   1. Strain of right shoulder, initial encounter      STATUS: This injury is determined to be WORK RELATED.    RETURN TO WORK:  Employee may return to work with restrictions.     Return Date: 3/22/2018            RESTRICTIONS:   Restrictions are to be followed at work and at home.  Restrictions are in effect until next follow-up visit.  Keep to one half reach with the right arm.  No crossarm activity with the right arm and shoulder.  No above shoulder work with the right arm.  Patient needs to limit any lift, carry, push or pull with the right hand to not more than 5 pounds.  No repetitive motion work with the right arm.  Occasional and light use of the right hand and arm is okay.    TREATMENT PLAN: Relative rest/restrictions for the strained shoulder.  Gentle range of motion exercises of the shoulder daily.  Make use of cold/ice compresses over the most sore areas on the shoulder--up to 10 minutes at a time.    Medications for this injury/condition: Tylenol--1-2 over-the-counter strength as often as every 8 hours, only as needed for pain.  Do not take more than 6 over-the-counter Tylenol tablets in 24 hours.  Biofreeze--topical medication--apply 3-4 times a day over the sore and tight areas on the shoulder and neck as this helps for the pain.  Be very careful to not apply warm applications over the biofreeze.    Referral/Consult:  Diagnostic Testing:   XR SHOULDER 3 VW RIGHT   Drug test not required.  Breath alcohol test not required.      Instructions: Call or return to clinic sooner if there is inadequate pain relief with these current measures or problems with the shoulder pain as well as the right  hand numbness and tingling clearly worsening.      NEXT RETURN VISIT: One week, sooner as needed 3/29/2018 @ 11:30AM  Thank you for the privilege of providing medical care for this injury/condition.  If there are any questions, please call the occupational health clinic at Dept: [phone].    Electronically signed on 3/22/2018 at 12:58 PM by:   LOUIS Cabrera   [facility]    The physician below agrees with the plan and restrictions placed on the patient by the provider above.     Supervising Physician  Ji Chan MD, MPH     None

## 2018-07-19 NOTE — PATIENT PROFILE ADULT. - NS PRO ABUSE SCREEN SUSPICION NEGLECT YN
Please inform patient that his thyroid and blood count are normal. His vitamin D level is low. Would recommend supplementing with vitamin D 50,000 international units twice weekly for 8 weeks and then rechecking a vitamin D level. no

## 2019-12-03 ENCOUNTER — TRANSCRIPTION ENCOUNTER (OUTPATIENT)
Age: 69
End: 2019-12-03

## 2020-05-09 NOTE — STROKE CODE NOTE - NIH STROKE SCALE: 8. SENSORY, QM
Offered pain medication, patient refused. States he doesn't need anything for pain yet.   (0) Normal; no sensory loss

## 2020-07-21 PROBLEM — E78.4 OTHER HYPERLIPIDEMIA: Chronic | Status: ACTIVE | Noted: 2017-08-18

## 2020-07-21 PROBLEM — I10 ESSENTIAL (PRIMARY) HYPERTENSION: Chronic | Status: ACTIVE | Noted: 2017-08-18

## 2020-07-28 ENCOUNTER — APPOINTMENT (OUTPATIENT)
Dept: INTERNAL MEDICINE | Facility: CLINIC | Age: 70
End: 2020-07-28
Payer: MEDICARE

## 2020-07-28 VITALS — TEMPERATURE: 97.3 F

## 2020-07-28 VITALS
DIASTOLIC BLOOD PRESSURE: 74 MMHG | SYSTOLIC BLOOD PRESSURE: 110 MMHG | WEIGHT: 193 LBS | HEIGHT: 74 IN | BODY MASS INDEX: 24.77 KG/M2

## 2020-07-28 DIAGNOSIS — Z82.49 FAMILY HISTORY OF ISCHEMIC HEART DISEASE AND OTHER DISEASES OF THE CIRCULATORY SYSTEM: ICD-10-CM

## 2020-07-28 DIAGNOSIS — Z87.891 PERSONAL HISTORY OF NICOTINE DEPENDENCE: ICD-10-CM

## 2020-07-28 DIAGNOSIS — Z91.89 OTHER SPECIFIED PERSONAL RISK FACTORS, NOT ELSEWHERE CLASSIFIED: ICD-10-CM

## 2020-07-28 DIAGNOSIS — Z80.6 FAMILY HISTORY OF LEUKEMIA: ICD-10-CM

## 2020-07-28 PROCEDURE — G0009: CPT

## 2020-07-28 PROCEDURE — 36415 COLL VENOUS BLD VENIPUNCTURE: CPT

## 2020-07-28 PROCEDURE — 99204 OFFICE O/P NEW MOD 45 MIN: CPT | Mod: 25

## 2020-07-28 PROCEDURE — 90670 PCV13 VACCINE IM: CPT

## 2020-07-28 RX ORDER — FINASTERIDE 5 MG/1
5 TABLET, FILM COATED ORAL DAILY
Qty: 90 | Refills: 3 | Status: ACTIVE | COMMUNITY
Start: 2020-07-28

## 2020-07-28 RX ORDER — ASPIRIN 81 MG/1
81 TABLET, COATED ORAL DAILY
Qty: 30 | Refills: 6 | Status: ACTIVE | COMMUNITY
Start: 2020-07-28

## 2020-07-28 NOTE — ASSESSMENT
[FreeTextEntry1] : 30 lbs wt loss\par seb. LE edema-will see card in 2 wks\par sz-cont same meds.  per neuro\par Continue same blood pressure medication.  Follow blood pressure.\par Continue same cholesterol medication.  Fasting lipid.\par A1C\par prevnar 13

## 2020-07-28 NOTE — PHYSICAL EXAM
[No Acute Distress] : no acute distress [Well Nourished] : well nourished [Well Developed] : well developed [Well-Appearing] : well-appearing [Normal Outer Ear/Nose] : the outer ears and nose were normal in appearance [No Accessory Muscle Use] : no accessory muscle use [Clear to Auscultation] : lungs were clear to auscultation bilaterally [No Respiratory Distress] : no respiratory distress  [Soft] : abdomen soft [Normal Rate] : normal rate  [Regular Rhythm] : with a regular rhythm [Non Tender] : non-tender [Normal Posterior Cervical Nodes] : no posterior cervical lymphadenopathy [No Spinal Tenderness] : no spinal tenderness [Normal Anterior Cervical Nodes] : no anterior cervical lymphadenopathy [No CVA Tenderness] : no CVA  tenderness [Abnormal Temperature] : normal temperature [Cyanosis] : no cyanosis [Coordination Grossly Intact] : coordination grossly intact [Speech Grossly Normal] : speech grossly normal [Normal Gait] : normal gait [Normal Affect] : the affect was normal [de-identified] : +1 seb. LE edema [Normal Mood] : the mood was normal

## 2020-07-29 LAB
ALBUMIN SERPL ELPH-MCNC: 4.1 G/DL
ALP BLD-CCNC: 59 U/L
ALT SERPL-CCNC: 14 U/L
ANION GAP SERPL CALC-SCNC: 14 MMOL/L
AST SERPL-CCNC: 21 U/L
BASOPHILS # BLD AUTO: 0.06 K/UL
BASOPHILS NFR BLD AUTO: 0.8 %
BILIRUB SERPL-MCNC: 0.4 MG/DL
BUN SERPL-MCNC: 14 MG/DL
CALCIUM SERPL-MCNC: 8.8 MG/DL
CHLORIDE SERPL-SCNC: 100 MMOL/L
CHOLEST SERPL-MCNC: 194 MG/DL
CHOLEST/HDLC SERPL: 2.7 RATIO
CK SERPL-CCNC: 82 U/L
CO2 SERPL-SCNC: 24 MMOL/L
CREAT SERPL-MCNC: 0.9 MG/DL
CRP SERPL-MCNC: 0.32 MG/DL
EOSINOPHIL # BLD AUTO: 0.05 K/UL
EOSINOPHIL NFR BLD AUTO: 0.7 %
ESTIMATED AVERAGE GLUCOSE: 91 MG/DL
GLUCOSE SERPL-MCNC: 100 MG/DL
HBA1C MFR BLD HPLC: 4.8 %
HBV SURFACE AB SER QL: NONREACTIVE
HBV SURFACE AG SER QL: NONREACTIVE
HCT VFR BLD CALC: 38.6 %
HCV AB SER QL: NONREACTIVE
HCV S/CO RATIO: 0.07 S/CO
HDLC SERPL-MCNC: 73 MG/DL
HGB BLD-MCNC: 13.2 G/DL
IMM GRANULOCYTES NFR BLD AUTO: 0.1 %
LDLC SERPL CALC-MCNC: 108 MG/DL
LYMPHOCYTES # BLD AUTO: 3.36 K/UL
LYMPHOCYTES NFR BLD AUTO: 44.2 %
MAN DIFF?: NORMAL
MCHC RBC-ENTMCNC: 31.9 PG
MCHC RBC-ENTMCNC: 34.2 GM/DL
MCV RBC AUTO: 93.2 FL
MONOCYTES # BLD AUTO: 0.74 K/UL
MONOCYTES NFR BLD AUTO: 9.7 %
NEUTROPHILS # BLD AUTO: 3.39 K/UL
NEUTROPHILS NFR BLD AUTO: 44.5 %
PLATELET # BLD AUTO: 183 K/UL
POTASSIUM SERPL-SCNC: 4.3 MMOL/L
PROT SERPL-MCNC: 6.1 G/DL
PSA SERPL-MCNC: 2.9 NG/ML
RBC # BLD: 4.14 M/UL
RBC # FLD: 14.3 %
SARS-COV-2 IGG SERPL IA-ACNC: <0.1 INDEX
SARS-COV-2 IGG SERPL QL IA: NEGATIVE
SODIUM SERPL-SCNC: 139 MMOL/L
TRIGL SERPL-MCNC: 67 MG/DL
TSH SERPL-ACNC: 3.35 UIU/ML
WBC # FLD AUTO: 7.61 K/UL

## 2020-08-05 ENCOUNTER — APPOINTMENT (OUTPATIENT)
Dept: INTERNAL MEDICINE | Facility: CLINIC | Age: 70
End: 2020-08-05
Payer: MEDICARE

## 2020-08-05 VITALS
WEIGHT: 193 LBS | BODY MASS INDEX: 24.77 KG/M2 | OXYGEN SATURATION: 97 % | HEIGHT: 74 IN | TEMPERATURE: 98.1 F | SYSTOLIC BLOOD PRESSURE: 118 MMHG | DIASTOLIC BLOOD PRESSURE: 70 MMHG | HEART RATE: 62 BPM

## 2020-08-05 PROCEDURE — 93000 ELECTROCARDIOGRAM COMPLETE: CPT

## 2020-08-05 PROCEDURE — 90715 TDAP VACCINE 7 YRS/> IM: CPT | Mod: GY

## 2020-08-05 PROCEDURE — 99214 OFFICE O/P EST MOD 30 MIN: CPT | Mod: 25

## 2020-08-05 PROCEDURE — 90471 IMMUNIZATION ADMIN: CPT

## 2020-08-05 NOTE — HEALTH RISK ASSESSMENT
[Patient reported colonoscopy was abnormal] : Patient reported colonoscopy was abnormal [None] : None [With Significant Other] : lives with significant other [Retired] : retired [] :  [Feels Safe at Home] : Feels safe at home [Seat Belt] :  uses seat belt [Smoke Detector] : smoke detector [Designated Healthcare Proxy] : Designated healthcare proxy [Name: ___] : Health Care Proxy's Name: [unfilled]  [Relationship: ___] : Relationship: [unfilled] [] : No [No] : No [Reports changes in vision] : Reports no changes in vision

## 2020-08-05 NOTE — ASSESSMENT
[FreeTextEntry1] : follow weight\par Wyatt. LE edema-saw card.  keep LE elevated\par Continue same blood pressure medication.  Follow blood pressure.\par Continue same cholesterol medication.  Follow lipid.\par sz-cont. same meds.  per neuro\par elevated PSA-per \par RX shingrix\par Tdap

## 2020-08-05 NOTE — PHYSICAL EXAM
[No Acute Distress] : no acute distress [Well Nourished] : well nourished [Well-Appearing] : well-appearing [Well Developed] : well developed [Normal Outer Ear/Nose] : the outer ears and nose were normal in appearance [Supple] : supple [No Respiratory Distress] : no respiratory distress  [No Accessory Muscle Use] : no accessory muscle use [Normal Rate] : normal rate  [Clear to Auscultation] : lungs were clear to auscultation bilaterally [Regular Rhythm] : with a regular rhythm [Soft] : abdomen soft [Non Tender] : non-tender [No CVA Tenderness] : no CVA  tenderness [Testes Mass (___cm)] : there were no testicular masses [No Spinal Tenderness] : no spinal tenderness [Speech Grossly Normal] : speech grossly normal [Normal Affect] : the affect was normal [Normal Mood] : the mood was normal [Cyanosis] : no cyanosis [Abnormal Temperature] : normal temperature [de-identified] : +1 seb. LE edema

## 2020-10-27 ENCOUNTER — INPATIENT (INPATIENT)
Facility: HOSPITAL | Age: 70
LOS: 4 days | Discharge: HOME | DRG: 101 | End: 2020-11-01
Attending: PSYCHIATRY & NEUROLOGY | Admitting: PSYCHIATRY & NEUROLOGY
Payer: MEDICARE

## 2020-10-27 VITALS — HEIGHT: 74 IN

## 2020-10-27 DIAGNOSIS — Z98.890 OTHER SPECIFIED POSTPROCEDURAL STATES: Chronic | ICD-10-CM

## 2020-10-27 LAB
APTT BLD: 34.9 SEC — SIGNIFICANT CHANGE UP (ref 27.5–35.5)
BASOPHILS # BLD AUTO: 0.05 K/UL — SIGNIFICANT CHANGE UP (ref 0–0.2)
BASOPHILS NFR BLD AUTO: 0.4 % — SIGNIFICANT CHANGE UP (ref 0–2)
EOSINOPHIL # BLD AUTO: 0.01 K/UL — SIGNIFICANT CHANGE UP (ref 0–0.5)
EOSINOPHIL NFR BLD AUTO: 0.1 % — SIGNIFICANT CHANGE UP (ref 0–6)
HCT VFR BLD CALC: 45 % — SIGNIFICANT CHANGE UP (ref 39–50)
HGB BLD-MCNC: 15.6 G/DL — SIGNIFICANT CHANGE UP (ref 13–17)
IMM GRANULOCYTES NFR BLD AUTO: 0.8 % — SIGNIFICANT CHANGE UP (ref 0–1.5)
INR BLD: 1.03 RATIO — SIGNIFICANT CHANGE UP (ref 0.88–1.16)
LYMPHOCYTES # BLD AUTO: 33.6 % — SIGNIFICANT CHANGE UP (ref 13–44)
LYMPHOCYTES # BLD AUTO: 4.01 K/UL — HIGH (ref 1–3.3)
MCHC RBC-ENTMCNC: 30.9 PG — SIGNIFICANT CHANGE UP (ref 27–34)
MCHC RBC-ENTMCNC: 34.7 GM/DL — SIGNIFICANT CHANGE UP (ref 32–36)
MCV RBC AUTO: 89.1 FL — SIGNIFICANT CHANGE UP (ref 80–100)
MONOCYTES # BLD AUTO: 0.87 K/UL — SIGNIFICANT CHANGE UP (ref 0–0.9)
MONOCYTES NFR BLD AUTO: 7.3 % — SIGNIFICANT CHANGE UP (ref 2–14)
NEUTROPHILS # BLD AUTO: 6.9 K/UL — SIGNIFICANT CHANGE UP (ref 1.8–7.4)
NEUTROPHILS NFR BLD AUTO: 57.8 % — SIGNIFICANT CHANGE UP (ref 43–77)
NRBC # BLD: 0 /100 WBCS — SIGNIFICANT CHANGE UP (ref 0–0)
PLATELET # BLD AUTO: 243 K/UL — SIGNIFICANT CHANGE UP (ref 150–400)
PROTHROM AB SERPL-ACNC: 12.3 SEC — SIGNIFICANT CHANGE UP (ref 10.6–13.6)
RBC # BLD: 5.05 M/UL — SIGNIFICANT CHANGE UP (ref 4.2–5.8)
RBC # FLD: 13.5 % — SIGNIFICANT CHANGE UP (ref 10.3–14.5)
WBC # BLD: 11.94 K/UL — HIGH (ref 3.8–10.5)
WBC # FLD AUTO: 11.94 K/UL — HIGH (ref 3.8–10.5)

## 2020-10-27 PROCEDURE — 70496 CT ANGIOGRAPHY HEAD: CPT | Mod: 26

## 2020-10-27 PROCEDURE — 93010 ELECTROCARDIOGRAM REPORT: CPT | Mod: 59

## 2020-10-27 PROCEDURE — 0042T: CPT

## 2020-10-27 PROCEDURE — 31500 INSERT EMERGENCY AIRWAY: CPT | Mod: GC

## 2020-10-27 PROCEDURE — 70450 CT HEAD/BRAIN W/O DYE: CPT | Mod: 26,59

## 2020-10-27 PROCEDURE — 99291 CRITICAL CARE FIRST HOUR: CPT | Mod: CS,25,GC

## 2020-10-27 PROCEDURE — 70498 CT ANGIOGRAPHY NECK: CPT | Mod: 26

## 2020-10-27 RX ORDER — LEVETIRACETAM 250 MG/1
2000 TABLET, FILM COATED ORAL ONCE
Refills: 0 | Status: COMPLETED | OUTPATIENT
Start: 2020-10-27 | End: 2020-10-27

## 2020-10-27 RX ADMIN — Medication 2 MILLIGRAM(S): at 23:33

## 2020-10-27 NOTE — ED PROVIDER NOTE - CLINICAL SUMMARY MEDICAL DECISION MAKING FREE TEXT BOX
70M with history of seizure p/w partial focal seizure, concerning for status epilepticus. Will work up for infection, electrolyte abnormality.

## 2020-10-27 NOTE — ED PROVIDER NOTE - ATTENDING CONTRIBUTION TO CARE
I supervised care. briefly, 70yr M hx of seizures (resistant to multiple meds) sec to a resection of meningioma in 2015, p/w 3hrs of post ictal symptoms, suspected for stroke due to rt sided hemiparesis, as well as rt facial droop and aphasia. had urinary incontinent, according to wife (Jeannie) whom I spoke on the phone, was in normal health (at baseline is active and independent, and recently travelled to [facility] with her), no fever chills, n/v, abd pain, diarrhea. upon arrival with normal finger stick, proceeded to CT given limited info available and a high concern for a superimopsing stroke. CT no intracranial bleed, and neurology stroke team available from patient presentation. NIH stroke score of 14. ativan given for repetetive movements, intermittently resolved and pt was awake enough to respond with single words and purposeful movements. briefly hypoxic to 88 and responded to [id number]. I spoke with wife and she is insisting that he absolutely didn't want intubation under any circumstance. I explained that in order to effectively and safely treat his resistant seizures, and to avoid potential complications I prefer to intubate but she insists he would not have wanted that because he hated it last time. I told her that we will given medications and treat with non invasive measures but if the need arises, we will discuss again and she is ok with us contacting her again.

## 2020-10-27 NOTE — ED PROVIDER NOTE - PHYSICAL EXAMINATION
Gen:   Head: NCAT  HEENT: PERRL, oral mucosa moist, normal conjunctiva, oropharynx clear without exudate or erythema  Lung: CTAB, no respiratory distress, no wheezing, rales, rhonchi  CV: normal s1/s2, rrr, no murmurs, Normal perfusion, pulses 2+ throughout  Abd: soft, NTND, no CVA tenderness  MSK: No edema, no visible deformities, full range of motion in all 4 extremities  Neuro: RUE weakness, facial automatisms, LUE picking  Skin: No rash   Psych: unresponsive

## 2020-10-27 NOTE — ED PROVIDER NOTE - PROGRESS NOTE DETAILS
after discussion with family and neurology, wife agreed to intubation, given he is increasingly somnolent and unable to arouse and medications have so far not able to break seizures. is being admitted to ICU. intubaed without events, is hypertensive after, will give fentanyl. DJ

## 2020-10-27 NOTE — ED PROVIDER NOTE - OBJECTIVE STATEMENT
70M with history of seizure disorder presents with seizure which started 3 hours prior to EMS arrival.   Code stroke called given RUE weakness, aphasia and right sided facial droop    Home meds: phenytoin, valproic acid, keppra, aspirin

## 2020-10-28 ENCOUNTER — NON-APPOINTMENT (OUTPATIENT)
Age: 70
End: 2020-10-28

## 2020-10-28 DIAGNOSIS — G40.901 EPILEPSY, UNSPECIFIED, NOT INTRACTABLE, WITH STATUS EPILEPTICUS: ICD-10-CM

## 2020-10-28 LAB
ALBUMIN SERPL ELPH-MCNC: 4.5 G/DL — SIGNIFICANT CHANGE UP (ref 3.3–5)
ALP SERPL-CCNC: 70 U/L — SIGNIFICANT CHANGE UP (ref 40–120)
ALT FLD-CCNC: 12 U/L — SIGNIFICANT CHANGE UP (ref 10–45)
ANION GAP SERPL CALC-SCNC: 12 MMOL/L — SIGNIFICANT CHANGE UP (ref 5–17)
APAP SERPL-MCNC: <15 UG/ML — SIGNIFICANT CHANGE UP (ref 10–30)
APPEARANCE UR: ABNORMAL
AST SERPL-CCNC: 18 U/L — SIGNIFICANT CHANGE UP (ref 10–40)
BILIRUB SERPL-MCNC: 0.3 MG/DL — SIGNIFICANT CHANGE UP (ref 0.2–1.2)
BILIRUB UR-MCNC: NEGATIVE — SIGNIFICANT CHANGE UP
BLD GP AB SCN SERPL QL: NEGATIVE — SIGNIFICANT CHANGE UP
BUN SERPL-MCNC: 8 MG/DL — SIGNIFICANT CHANGE UP (ref 7–23)
CALCIUM SERPL-MCNC: 9 MG/DL — SIGNIFICANT CHANGE UP (ref 8.4–10.5)
CHLORIDE SERPL-SCNC: 101 MMOL/L — SIGNIFICANT CHANGE UP (ref 96–108)
CO2 SERPL-SCNC: 27 MMOL/L — SIGNIFICANT CHANGE UP (ref 22–31)
COLOR SPEC: SIGNIFICANT CHANGE UP
CREAT SERPL-MCNC: 0.75 MG/DL — SIGNIFICANT CHANGE UP (ref 0.5–1.3)
DIFF PNL FLD: ABNORMAL
ETHANOL SERPL-MCNC: SIGNIFICANT CHANGE UP MG/DL (ref 0–10)
GLUCOSE SERPL-MCNC: 129 MG/DL — HIGH (ref 70–99)
GLUCOSE UR QL: NEGATIVE — SIGNIFICANT CHANGE UP
KETONES UR-MCNC: NEGATIVE — SIGNIFICANT CHANGE UP
LEUKOCYTE ESTERASE UR-ACNC: NEGATIVE — SIGNIFICANT CHANGE UP
NITRITE UR-MCNC: NEGATIVE — SIGNIFICANT CHANGE UP
PH UR: 7.5 — SIGNIFICANT CHANGE UP (ref 5–8)
POTASSIUM SERPL-MCNC: 4.3 MMOL/L — SIGNIFICANT CHANGE UP (ref 3.5–5.3)
POTASSIUM SERPL-SCNC: 4.3 MMOL/L — SIGNIFICANT CHANGE UP (ref 3.5–5.3)
PROT SERPL-MCNC: 6.9 G/DL — SIGNIFICANT CHANGE UP (ref 6–8.3)
PROT UR-MCNC: ABNORMAL
RH IG SCN BLD-IMP: POSITIVE — SIGNIFICANT CHANGE UP
SALICYLATES SERPL-MCNC: <2 MG/DL — LOW (ref 15–30)
SARS-COV-2 IGG SERPL QL IA: NEGATIVE — SIGNIFICANT CHANGE UP
SARS-COV-2 IGM SERPL IA-ACNC: <0.1 INDEX — SIGNIFICANT CHANGE UP
SARS-COV-2 RNA SPEC QL NAA+PROBE: SIGNIFICANT CHANGE UP
SODIUM SERPL-SCNC: 140 MMOL/L — SIGNIFICANT CHANGE UP (ref 135–145)
SP GR SPEC: 1.03 — HIGH (ref 1.01–1.02)
TROPONIN T, HIGH SENSITIVITY RESULT: 14 NG/L — SIGNIFICANT CHANGE UP (ref 0–51)
UROBILINOGEN FLD QL: NEGATIVE — SIGNIFICANT CHANGE UP
VALPROATE SERPL-MCNC: 70 UG/ML — SIGNIFICANT CHANGE UP (ref 50–100)

## 2020-10-28 PROCEDURE — 95720 EEG PHY/QHP EA INCR W/VEEG: CPT

## 2020-10-28 PROCEDURE — 99291 CRITICAL CARE FIRST HOUR: CPT

## 2020-10-28 PROCEDURE — 99292 CRITICAL CARE ADDL 30 MIN: CPT

## 2020-10-28 PROCEDURE — 71045 X-RAY EXAM CHEST 1 VIEW: CPT | Mod: 26

## 2020-10-28 PROCEDURE — 99223 1ST HOSP IP/OBS HIGH 75: CPT

## 2020-10-28 RX ORDER — SODIUM CHLORIDE 9 MG/ML
500 INJECTION INTRAMUSCULAR; INTRAVENOUS; SUBCUTANEOUS ONCE
Refills: 0 | Status: COMPLETED | OUTPATIENT
Start: 2020-10-28 | End: 2020-10-28

## 2020-10-28 RX ORDER — FINASTERIDE 5 MG/1
5 TABLET, FILM COATED ORAL DAILY
Refills: 0 | Status: DISCONTINUED | OUTPATIENT
Start: 2020-10-28 | End: 2020-11-01

## 2020-10-28 RX ORDER — CHLORHEXIDINE GLUCONATE 213 G/1000ML
1 SOLUTION TOPICAL
Refills: 0 | Status: DISCONTINUED | OUTPATIENT
Start: 2020-10-28 | End: 2020-10-28

## 2020-10-28 RX ORDER — FENTANYL CITRATE 50 UG/ML
100 INJECTION INTRAVENOUS ONCE
Refills: 0 | Status: DISCONTINUED | OUTPATIENT
Start: 2020-10-28 | End: 2020-10-28

## 2020-10-28 RX ORDER — VALPROIC ACID (AS SODIUM SALT) 250 MG/5ML
1500 SOLUTION, ORAL ORAL DAILY
Refills: 0 | Status: DISCONTINUED | OUTPATIENT
Start: 2020-10-28 | End: 2020-10-29

## 2020-10-28 RX ORDER — DIVALPROEX SODIUM 500 MG/1
500 TABLET, DELAYED RELEASE ORAL
Refills: 0 | Status: DISCONTINUED | OUTPATIENT
Start: 2020-10-28 | End: 2020-10-28

## 2020-10-28 RX ORDER — DIVALPROEX SODIUM 500 MG/1
750 TABLET, DELAYED RELEASE ORAL AT BEDTIME
Refills: 0 | Status: DISCONTINUED | OUTPATIENT
Start: 2020-10-28 | End: 2020-10-28

## 2020-10-28 RX ORDER — FOSPHENYTOIN 50 MG/ML
1900 INJECTION INTRAMUSCULAR; INTRAVENOUS ONCE
Refills: 0 | Status: DISCONTINUED | OUTPATIENT
Start: 2020-10-28 | End: 2020-11-01

## 2020-10-28 RX ORDER — ETOMIDATE 2 MG/ML
20 INJECTION INTRAVENOUS ONCE
Refills: 0 | Status: COMPLETED | OUTPATIENT
Start: 2020-10-28 | End: 2020-10-28

## 2020-10-28 RX ORDER — DIVALPROEX SODIUM 500 MG/1
1500 TABLET, DELAYED RELEASE ORAL
Refills: 0 | Status: DISCONTINUED | OUTPATIENT
Start: 2020-10-28 | End: 2020-10-28

## 2020-10-28 RX ORDER — LACOSAMIDE 50 MG/1
200 TABLET ORAL
Refills: 0 | Status: DISCONTINUED | OUTPATIENT
Start: 2020-10-28 | End: 2020-10-28

## 2020-10-28 RX ORDER — LOSARTAN POTASSIUM 100 MG/1
50 TABLET, FILM COATED ORAL DAILY
Refills: 0 | Status: DISCONTINUED | OUTPATIENT
Start: 2020-10-28 | End: 2020-11-01

## 2020-10-28 RX ORDER — SIMVASTATIN 20 MG/1
10 TABLET, FILM COATED ORAL AT BEDTIME
Refills: 0 | Status: DISCONTINUED | OUTPATIENT
Start: 2020-10-28 | End: 2020-10-28

## 2020-10-28 RX ORDER — FOSPHENYTOIN 50 MG/ML
200 INJECTION INTRAMUSCULAR; INTRAVENOUS ONCE
Refills: 0 | Status: COMPLETED | OUTPATIENT
Start: 2020-10-28 | End: 2020-10-28

## 2020-10-28 RX ORDER — FINASTERIDE 5 MG/1
5 TABLET, FILM COATED ORAL DAILY
Refills: 0 | Status: DISCONTINUED | OUTPATIENT
Start: 2020-10-28 | End: 2020-10-28

## 2020-10-28 RX ORDER — ROCURONIUM BROMIDE 10 MG/ML
100 VIAL (ML) INTRAVENOUS ONCE
Refills: 0 | Status: COMPLETED | OUTPATIENT
Start: 2020-10-28 | End: 2020-10-28

## 2020-10-28 RX ORDER — LOSARTAN POTASSIUM 100 MG/1
50 TABLET, FILM COATED ORAL DAILY
Refills: 0 | Status: DISCONTINUED | OUTPATIENT
Start: 2020-10-28 | End: 2020-10-28

## 2020-10-28 RX ORDER — VALPROIC ACID (AS SODIUM SALT) 250 MG/5ML
500 SOLUTION, ORAL ORAL DAILY
Refills: 0 | Status: DISCONTINUED | OUTPATIENT
Start: 2020-10-28 | End: 2020-10-29

## 2020-10-28 RX ORDER — LEVETIRACETAM 250 MG/1
1000 TABLET, FILM COATED ORAL
Refills: 0 | Status: DISCONTINUED | OUTPATIENT
Start: 2020-10-28 | End: 2020-10-28

## 2020-10-28 RX ORDER — SODIUM CHLORIDE 9 MG/ML
1000 INJECTION INTRAMUSCULAR; INTRAVENOUS; SUBCUTANEOUS
Refills: 0 | Status: DISCONTINUED | OUTPATIENT
Start: 2020-10-28 | End: 2020-11-01

## 2020-10-28 RX ORDER — LACOSAMIDE 50 MG/1
200 TABLET ORAL ONCE
Refills: 0 | Status: DISCONTINUED | OUTPATIENT
Start: 2020-10-28 | End: 2020-10-28

## 2020-10-28 RX ORDER — LEVETIRACETAM 250 MG/1
1500 TABLET, FILM COATED ORAL EVERY 12 HOURS
Refills: 0 | Status: DISCONTINUED | OUTPATIENT
Start: 2020-10-28 | End: 2020-11-01

## 2020-10-28 RX ORDER — SIMVASTATIN 20 MG/1
10 TABLET, FILM COATED ORAL AT BEDTIME
Refills: 0 | Status: DISCONTINUED | OUTPATIENT
Start: 2020-10-28 | End: 2020-11-01

## 2020-10-28 RX ORDER — ESCITALOPRAM OXALATE 10 MG/1
10 TABLET, FILM COATED ORAL DAILY
Refills: 0 | Status: DISCONTINUED | OUTPATIENT
Start: 2020-10-28 | End: 2020-11-01

## 2020-10-28 RX ORDER — ESCITALOPRAM OXALATE 10 MG/1
10 TABLET, FILM COATED ORAL DAILY
Refills: 0 | Status: DISCONTINUED | OUTPATIENT
Start: 2020-10-28 | End: 2020-10-28

## 2020-10-28 RX ORDER — PANTOPRAZOLE SODIUM 20 MG/1
40 TABLET, DELAYED RELEASE ORAL DAILY
Refills: 0 | Status: DISCONTINUED | OUTPATIENT
Start: 2020-10-28 | End: 2020-10-28

## 2020-10-28 RX ORDER — ENOXAPARIN SODIUM 100 MG/ML
40 INJECTION SUBCUTANEOUS
Refills: 0 | Status: DISCONTINUED | OUTPATIENT
Start: 2020-10-28 | End: 2020-11-01

## 2020-10-28 RX ORDER — PROPOFOL 10 MG/ML
30 INJECTION, EMULSION INTRAVENOUS
Qty: 1000 | Refills: 0 | Status: DISCONTINUED | OUTPATIENT
Start: 2020-10-28 | End: 2020-10-28

## 2020-10-28 RX ORDER — SENNA PLUS 8.6 MG/1
2 TABLET ORAL AT BEDTIME
Refills: 0 | Status: DISCONTINUED | OUTPATIENT
Start: 2020-10-28 | End: 2020-11-01

## 2020-10-28 RX ORDER — PHENYLEPHRINE HYDROCHLORIDE 10 MG/ML
0.1 INJECTION INTRAVENOUS
Qty: 40 | Refills: 0 | Status: DISCONTINUED | OUTPATIENT
Start: 2020-10-28 | End: 2020-10-28

## 2020-10-28 RX ORDER — DIVALPROEX SODIUM 500 MG/1
500 TABLET, DELAYED RELEASE ORAL DAILY
Refills: 0 | Status: DISCONTINUED | OUTPATIENT
Start: 2020-10-28 | End: 2020-10-28

## 2020-10-28 RX ORDER — POLYETHYLENE GLYCOL 3350 17 G/17G
17 POWDER, FOR SOLUTION ORAL EVERY 12 HOURS
Refills: 0 | Status: DISCONTINUED | OUTPATIENT
Start: 2020-10-28 | End: 2020-11-01

## 2020-10-28 RX ORDER — CHLORHEXIDINE GLUCONATE 213 G/1000ML
15 SOLUTION TOPICAL EVERY 12 HOURS
Refills: 0 | Status: DISCONTINUED | OUTPATIENT
Start: 2020-10-28 | End: 2020-10-28

## 2020-10-28 RX ORDER — LACOSAMIDE 50 MG/1
200 TABLET ORAL
Refills: 0 | Status: DISCONTINUED | OUTPATIENT
Start: 2020-10-28 | End: 2020-10-30

## 2020-10-28 RX ORDER — PROPOFOL 10 MG/ML
50 INJECTION, EMULSION INTRAVENOUS ONCE
Refills: 0 | Status: COMPLETED | OUTPATIENT
Start: 2020-10-28 | End: 2020-10-28

## 2020-10-28 RX ADMIN — ENOXAPARIN SODIUM 40 MILLIGRAM(S): 100 INJECTION SUBCUTANEOUS at 17:07

## 2020-10-28 RX ADMIN — FENTANYL CITRATE 100 MICROGRAM(S): 50 INJECTION INTRAVENOUS at 01:50

## 2020-10-28 RX ADMIN — Medication 2 MILLIGRAM(S): at 23:50

## 2020-10-28 RX ADMIN — LEVETIRACETAM 400 MILLIGRAM(S): 250 TABLET, FILM COATED ORAL at 05:43

## 2020-10-28 RX ADMIN — LACOSAMIDE 200 MILLIGRAM(S): 50 TABLET ORAL at 17:07

## 2020-10-28 RX ADMIN — POLYETHYLENE GLYCOL 3350 17 GRAM(S): 17 POWDER, FOR SOLUTION ORAL at 17:08

## 2020-10-28 RX ADMIN — LEVETIRACETAM 600 MILLIGRAM(S): 250 TABLET, FILM COATED ORAL at 00:09

## 2020-10-28 RX ADMIN — ETOMIDATE 20 MILLIGRAM(S): 2 INJECTION INTRAVENOUS at 01:13

## 2020-10-28 RX ADMIN — SIMVASTATIN 10 MILLIGRAM(S): 20 TABLET, FILM COATED ORAL at 22:20

## 2020-10-28 RX ADMIN — LACOSAMIDE 200 MILLIGRAM(S): 50 TABLET ORAL at 05:43

## 2020-10-28 RX ADMIN — LACOSAMIDE 200 MILLIGRAM(S): 50 TABLET ORAL at 00:35

## 2020-10-28 RX ADMIN — CHLORHEXIDINE GLUCONATE 15 MILLILITER(S): 213 SOLUTION TOPICAL at 05:43

## 2020-10-28 RX ADMIN — FENTANYL CITRATE 100 MICROGRAM(S): 50 INJECTION INTRAVENOUS at 02:30

## 2020-10-28 RX ADMIN — CHLORHEXIDINE GLUCONATE 1 APPLICATION(S): 213 SOLUTION TOPICAL at 03:53

## 2020-10-28 RX ADMIN — Medication 360 MILLIGRAM(S): at 22:20

## 2020-10-28 RX ADMIN — DIVALPROEX SODIUM 500 MILLIGRAM(S): 500 TABLET, DELAYED RELEASE ORAL at 10:22

## 2020-10-28 RX ADMIN — SODIUM CHLORIDE 1000 MILLILITER(S): 9 INJECTION INTRAMUSCULAR; INTRAVENOUS; SUBCUTANEOUS at 10:23

## 2020-10-28 RX ADMIN — FOSPHENYTOIN 108 MILLIGRAM(S) PE: 50 INJECTION INTRAMUSCULAR; INTRAVENOUS at 13:43

## 2020-10-28 RX ADMIN — Medication 100 MILLIGRAM(S): at 01:13

## 2020-10-28 RX ADMIN — FINASTERIDE 5 MILLIGRAM(S): 5 TABLET, FILM COATED ORAL at 12:27

## 2020-10-28 RX ADMIN — SODIUM CHLORIDE 75 MILLILITER(S): 9 INJECTION INTRAMUSCULAR; INTRAVENOUS; SUBCUTANEOUS at 08:43

## 2020-10-28 RX ADMIN — PROPOFOL 17.3 MICROGRAM(S)/KG/MIN: 10 INJECTION, EMULSION INTRAVENOUS at 02:38

## 2020-10-28 RX ADMIN — ESCITALOPRAM OXALATE 10 MILLIGRAM(S): 10 TABLET, FILM COATED ORAL at 12:26

## 2020-10-28 RX ADMIN — LEVETIRACETAM 400 MILLIGRAM(S): 250 TABLET, FILM COATED ORAL at 17:07

## 2020-10-28 RX ADMIN — POLYETHYLENE GLYCOL 3350 17 GRAM(S): 17 POWDER, FOR SOLUTION ORAL at 05:43

## 2020-10-28 RX ADMIN — Medication 1500 MILLIGRAM(S): at 22:19

## 2020-10-28 NOTE — PROGRESS NOTE ADULT - ASSESSMENT
ASSESSMENT/PLAN: Status epilepticus requiring intubation and IV sedation    NEURO:  Seizures: Called EEG tech to hook patient up to EEG to r/o ongoing NCSE; continue propofol as clinical seizures appear controlled at current dose; wean propofol once on EEG; continue home AEDs plus lacosamide; f/u AED levels and adjust dosages as needed  Check CPK, lactate, VBG, UA, Urine Cx, CXR, Urine tox, EtOH level, COVID-19, Blood cultures x 2 to search for provoking factors  Once stabilized, consider functional NSGY consult re: seizure focus resection     PULM:   Vent support for now; wean once propofol weaned  Aspiration precautions  VAP bundle    CARDS:  MAP >65mmHg with pressor support as needed  If escalating pressor requirement, will switch propofol to midazolam gtt    RENAL:  Fluids: IVF  Monitor urine output    GI:  Diet: NPO for now  Place NGT for medication administration  GI prophylaxis [] not indicated [x] PPI: intubated [] other:  Bowel regimen     ENDO:   Goal euglycemia (-180)    HEME/ONC:  VTE prophylaxis: [x] SCDs [x] chemoprophylaxis [] hold chemoprophylaxis due to: [] high risk of DVT/PE on admission due to:    ID:  Monitor for fever; r/o infection    SOCIAL/FAMILY:  [x] awaiting [] updated at bedside [] family meeting    CODE STATUS:  [x] Full Code [] DNR [] DNI [] Palliative/Comfort Care    DISPOSITION:  [x] ICU [] Stroke Unit [] Floor [] EMU [] RCU [] PCU    [x] Patient is at high risk of neurologic deterioration/death due to: status epilepticus    Contact: [phone]

## 2020-10-28 NOTE — ED POST DISCHARGE NOTE - ADDITIONAL DOCUMENTATION
10/28/20: Didi, hospitalist coodinator called on behalf of pts pcp Dr. Suha Muñiz to inquire about pts dispo from the ED, discussed they were admitted per chart review. -Angelique Horton PA-C

## 2020-10-28 NOTE — PHYSICAL THERAPY INITIAL EVALUATION ADULT - PERTINENT HX OF CURRENT PROBLEM, REHAB EVAL
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 Pt presented as a code stroke due to aphasia and R sided hemiparesis later determined to be focal myoclonic seizure with impaired awareness with resultant clinical NCSE due to undetermined etiology.

## 2020-10-28 NOTE — CONSULT NOTE ADULT - ASSESSMENT
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 sided hemiparesis later determined to be focal myoclonic seizure with impaired awareness with resultant clinical NCSE due to undetermined etiology. On exam, he was hypertensive, hypoxic requiring 2L O2 via NC, waxing between stupor and comatose state with only intermittent response to noxious stimuli of B/L UE and LE, had myoclonic R. facial, R. arm and R. hand twitching and R. gaze deviation with nystamoid jerks.  Occasionally had left hand myoclonic type jerking as well and B/L upgoing toes.  Noted to also have urinary incontinence.      Semiology 1: R. sided myoclonic jerks with aphasia and R. sided hemiparesis with impaired awareness +/- urinary incontinence lasting several minutes with 10-30 minutes post-ictal confusion  Semiology 2: GTC-->Last GTC 2018, previously required intubation.    LKN: 16:05  NIHSS: 12  GCS: 4  Baseline MRS: 0  Not a tPA candidate due to being outside LKN time and due to stroke not suspected  Not a thrombectomy candidate due to no LVO  CTH: Old L. temporal encephalomalacia, no new large strokes, masses or hemorrhage  CTA H&N: No LVO  CTP: 0 core, penumbra recorded as 24 mL (But notably in B/L fronal and occipital and cerebellar lobes --likely scan error), mismatch infinite.     IMPRESSION: Focal myoclonic seizure with impaired awareness with subsequent NCSE likely from L fronto-temporal etiology due to known focal epilepsy with unclear provoking trigger vs. breakthrough seizure. Last seizure 2 years prior    Plan  [] S/P IV Ativan 2mg x 2, Keppra 2g and 200mg IV Vimpat without return to baseline or resolution of myoclonic twitching.    [] Discussed with his health care proxy, Jeannie (wife) [phone], about risks and benefits of intubation and she agreed to proceed.   [] PT subsequently intubated for refractory NCSE, should start propofol gtt adjust as needed per clinical and/or EEG correlation  [] Check CPK, lactate, VBG, UA, Urine Cx, CXR, Urine tox, EtOH level, COVID-19, Blood cultures x 2 to search for provoking factors. PHT levels and VPA levels (to ensure therapeutic) and LEV levels to ensure PT was taking (Wife says was very comliant)  [] Home AED:  -Daily  mg 10:00 AM, 1500mg (2 tabs 750) 22:00  -Daily LEV 1000mg BID  -4 times a week PHT 400mg 10:00 AM  -3 times a week PHT 350mg 22:00     Assessment and plan discussed with epilepsy fellow, Dr. Drake, and with neuro ICU attending, Dr. 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 sided hemiparesis later determined to be focal myoclonic seizure with impaired awareness with resultant clinical NCSE due to undetermined etiology. On exam, he was hypertensive, hypoxic requiring 2L O2 via NC, waxing between stupor and comatose state with only intermittent response to noxious stimuli of B/L UE and LE, had myoclonic R. facial, R. arm and R. hand twitching and R. gaze deviation with nystamoid jerks.  Occasionally had left hand myoclonic type jerking as well and B/L upgoing toes.  Noted to also have urinary incontinence.      Semiology 1: R. sided myoclonic jerks with aphasia and R. sided hemiparesis with impaired awareness +/- urinary incontinence lasting several minutes with 10-30 minutes post-ictal confusion  Semiology 2: GTC-->Last GTC 2018, previously required intubation.    LKN: 16:05  NIHSS: 21  GCS: 4  Baseline MRS: 0  Not a tPA candidate due to being outside LKN time and due to stroke not suspected  Not a thrombectomy candidate due to no LVO  CTH: Old L. temporal encephalomalacia, no new large strokes, masses or hemorrhage  CTA H&N: No LVO  CTP: 0 core, penumbra recorded as 24 mL (But notably in B/L fronal and occipital and cerebellar lobes --likely scan error), mismatch infinite.     IMPRESSION: Focal myoclonic seizure with impaired awareness with subsequent NCSE likely from L fronto-temporal etiology due to known focal epilepsy with unclear provoking trigger vs. breakthrough seizure. Last seizure 2 years prior    [] S/P IV Ativan 2mg x 2, Keppra 2g and 200mg IV Vimpat without return to baseline or resolution of myoclonic twitching.    [] D/w HCP, Jeannie (wife) [phone], about risks and benefits of intubation and she agreed to proceed-->PT subsequently intubated for refractory NCSE, started on propofol gtt which improved clinical events.    [] PHT level subtherapeutic at 9.7, VPA level therapeutic at 70 with normal albumin.-->Recommend that PT receive LD of 200mg IV fos-PHT and then continuation of home dose PHT converted to fos-PHT.   []Will continue other home dose meds IV form.  ( IV qAM, 750 IV qHS | LEV 1000mg IV BID).   []Check CPK, lactate, VBG, UA, Urine Cx, CXR, Urine tox, EtOH level, COVID-19, Blood cultures x 2 to search for provoking factors. PHT levels and VPA levels (to ensure therapeutic) and LEV levels to ensure PT was taking (Wife says was very comliant)  [] Home AED:  -Daily  mg 10:00 AM, 1500mg (2 tabs 750) 22:00  -Daily LEV 1000mg BID  -4 times a week PHT 400mg 22:00, 3 times a week PHT 350mg 22:00

## 2020-10-28 NOTE — ED ADULT NURSE NOTE - OBJECTIVE STATEMENT
The pt is a 69 y/o M PMH HTN, epilepsy BIBEMS presenting to the ED with right-sided weakness, aphasia x 3 hours, as per EMS. As per EMS, pt was found 3 hours ago to have right-sided weakness. Code Stroke initiated at 2325. Neuro flowsheet in paper chart. Upon assessment, pt appears drowsy, clear breath sounds bilaterally, s1 s2 heart sounds, normal bowel sounds, skin warm, dry and intact, present peripheral pulses. Pt NSR on cardiac monitor, appropriate side rails raised, wheels locked, bed in lowest position.

## 2020-10-28 NOTE — CHART NOTE - NSCHARTNOTEFT_GEN_A_CORE
CAPRINI SCORE [CLOT] Score on Admission for     AGE RELATED RISK FACTORS                                                       MOBILITY RELATED FACTORS  [ ] Age 41-60 years                                            (1 Point)                  [ ] Bed rest                                                        (1 Point)  [X] Age: 61-74 years                                           (2 Points)                [ ] Plaster cast                                                   (2 Points)  [ ] Age= 75 years                                              (3 Points)                  [ ] Bed bound for more than 72 hours         (2 Points)    DISEASE RELATED RISK FACTORS                                               GENDER SPECIFIC FACTORS  [ ] Edema in the lower extremities                       (1 Point)           [ ] Pregnancy                                                          (1 Point)  [ ] Varicose veins                                               (1 Point)                  [ ] Post-partum < 6 weeks                                   (1 Point)             [X] BMI > 25 Kg/m2                                            (1 Point)                  [ ] Hormonal therapy  or oral contraception   (1 Point)                 [ ] Sepsis (in the previous month)                        (1 Point)            [ ] History of pregnancy complications             (1 point)  [ ] Pneumonia or serious lung disease                                            [ ] Unexplained or recurrent               (1 Point)           (in the previous month)                               (1 Point)  [ ] Abnormal pulmonary function test                     (1 Point)                 SURGERY RELATED RISK FACTORS (include planned surgeries)  [ ] Acute myocardial infarction                              (1 Point)                 [ ]  Section                                             (1 Point)  [ ] Congestive heart failure (in the previous month)  (1 Point)        [ ] Minor surgery                                                  (1 Point)   [ ] Inflammatory bowel disease                             (1 Point)                 [ ] Arthroscopic surgery                                        (2 Points)  [ ] Central venous access                                      (2 Points)  [ ] History / presence of malignancy                   (2 points)   [ ] General surgery lasting more than 45 minutes   (2 Points)       [ ] Stroke (in the previous month)                          (5 Points)               [ ] Elective arthroplasty                                         (5 Points)                                                                                                                                               HEMATOLOGY RELATED FACTORS                                                 TRAUMA RELATED RISK FACTORS  [ ] Prior episodes of VTE                                     (3 Points)                [ ] Fracture of the hip, pelvis, or leg                       (5 Points)  [ ] Positive family history for VTE                         (3 Points)             [ ] Acute spinal cord injury (in the previous month)  (5 Points)  [ ] Prothrombin 92667 A                                     (3 Points)                [ ] Paralysis  (less than 1 month)                             (5 Points)  [ ] Factor V Leiden                                             (3 Points)                   [ ] Multiple Trauma within 1 month                        (5 Points)  [ ] Lupus anticoagulants                                     (3 Points)                                                           [ ] Anticardiolipin antibodies                               (3 Points)                                                       [ ] High homocysteine in the blood                      (3 Points)                                             [ ] Other congenital or acquired thrombophilia      (3 Points)                                                [ ] Heparin induced thrombocytopenia                  (3 Points)                                          Total Score [   3       ]    Risk:  Very low 0   Low 1 to 2   Moderate 3 to 4   High =5       VTE Prophylaxis Recommendations:  [x] mechanical pneumatic compression devices                                      [ ] contraindicated: _____________________  [X] chemoprophylaxis                                                                                    [ ] contraindicated _____________________    **** HIGH LIKELIHOOD DVT PRESENT ON ADMISSION  [ ] (please order LE dopplers within 24 hours of admission)

## 2020-10-28 NOTE — PHYSICAL THERAPY INITIAL EVALUATION ADULT - ADDITIONAL COMMENTS
unable to accurately assess PLOF. Pt is poor historian. Attempted to call spouse, Jeannie, [phone], however no answer, voicemail left.

## 2020-10-28 NOTE — H&P ADULT - NSICDXPASTMEDICALHX_GEN_ALL_CORE_FT
PAST MEDICAL HISTORY:  Cavernoma S/p resection 2004    Essential hypertension     Other hyperlipidemia     Partial symptomatic epilepsy with complex partial seizures, not intractable, without status epilepticus Since 2004 s/p L. temporal cavernoma resection

## 2020-10-28 NOTE — H&P ADULT - NSHPPHYSICALEXAM_GEN_ALL_CORE
Vital Signs Last 24 Hrs  T(C): 36.6 (28 Oct 2020 07:00), Max: 37.6 (28 Oct 2020 01:46)  T(F): 97.9 (28 Oct 2020 07:00), Max: 99.7 (28 Oct 2020 01:46)  HR: 86 (28 Oct 2020 07:45) (59 - 97)  BP: 184/117 (28 Oct 2020 07:30) (96/68 - 197/119)  BP(mean): 130 (28 Oct 2020 07:30) (78 - 140)  RR: 16 (28 Oct 2020 07:45) (16 - 36)  SpO2: 100% (28 Oct 2020 07:45) (88% - 100%)    Physical exam on arrival:  General: Sitting up in stretcher, mild respiratory distress but initially protecting airway.   Mental Status: Stuporous with no response to noxious stimuli in all 4 extremities, unintelligble verbal output, unable to repeat, name or attend to examiner.  Not following any commands.   Cranial Nerves: PERRL (R = 3mm, L = 3mm) prior to medications, afterwards 2mm OU equally reactive. Visual fields intact via blinks to threat OU.  EOMI.  Initially midline gaze but had intermittent R. forced gaze with nystagmoid jerks lasting 2-3 minutes at a time.  Mild R. facial palsy with nasolabial fold flattening.  Intermittent myoclonic R. facial twitching.  Tongue midline, no tongue bite.  Initially protecting airway but after prolonged seizure lost gag reflex.  B/L corneals intact, B/L VOR intact.    Motor: Normal muscle bulk & tone.  Persistent rhythmic myoclonic jerks of RUE as well as L. hand.  Improved after 4mg ativan and Keppra loading dose but not completely resolved until propofol gtt.  At best was 	  Sensation: Symmetric B/L preserved sensation to light touch, pin prick, position.    Cortical: No extinction on double simultaneous touch and no signs of neglect.   Reflexes: ***/4.  Plantar Responses are ***.   Coordination: Intact rapid-alternating movements. No dysmetria on finger-to-nose or heel-to-shin.  No dysdiadodyskinesia  Gait: Normal stance, stride length, touch off, arm swing and wicho.   Normal Romberg. No postural instability. Vital Signs Last 24 Hrs  T(C): 36.6 (28 Oct 2020 07:00), Max: 37.6 (28 Oct 2020 01:46)  T(F): 97.9 (28 Oct 2020 07:00), Max: 99.7 (28 Oct 2020 01:46)  HR: 86 (28 Oct 2020 07:45) (59 - 97)  BP: 184/117 (28 Oct 2020 07:30) (96/68 - 197/119)  BP(mean): 130 (28 Oct 2020 07:30) (78 - 140)  RR: 16 (28 Oct 2020 07:45) (16 - 36)  SpO2: 100% (28 Oct 2020 07:45) (88% - 100%)    Physical exam on arrival:  General: Sitting up in stretcher, mild respiratory distress but initially protecting airway.   Cardiac: Hypertensive, RR, no murmurs, rubs or gallops.   Resp: Desaturation to 86% on RA, improved to 95% on 2L.  Upper airway restrictive lung sounds on auscultation.    Mental Status: Stuporous with no response to noxious stimuli in all 4 extremities, unintelligible verbal output, unable to repeat, name or attend to examiner.  Not following any commands.   Cranial Nerves: PERRL (R = 3mm, L = 3mm) prior to medications, afterwards 2mm OU equally reactive. Visual fields intact via blinks to threat OU.  EOMI.  Initially midline gaze but had intermittent R. forced gaze with nystagmoid jerks lasting 2-3 minutes at a time.  Mild R. facial palsy with nasolabial fold flattening.  Intermittent myoclonic R. facial twitching.  Tongue midline, no tongue bite.  Initially protecting airway but after prolonged seizure lost gag reflex.  B/L corneal reflex intact, B/L VOR intact.    Motor: Normal muscle bulk & tone.  Persistent rhythmic myoclonic jerks of RUE as well as L. hand.  Improved after 4mg ativan and Keppra loading dose but not completely resolved until propofol gtt.  At best was antigravity in both arms moving legs within plane of bed.  	  Sensation: No response to noxious stimuli throughout  Reflexes: 2/4 throughout.  Plantar Responses were upgoing B/L at start of exam and then persistent R upgoing but mute on L.

## 2020-10-28 NOTE — PHYSICAL THERAPY INITIAL EVALUATION ADULT - MANUAL MUSCLE TESTING RESULTS, REHAB EVAL
MMT B UE 4-/5, L hip flexion 3+/5, L hip extension 4-/5, PF 4/5, DF 4-/5, R hip flexion 3+/5, R hip extension 3+/5, PF 4/5, DF 4-/5.

## 2020-10-28 NOTE — H&P ADULT - HISTORY OF PRESENT ILLNESS
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 hemiparesis and aphasia.  IZABEL 16:05 10/27/20  Partial symptomatic epilepsy with complex partial seizures, not intractable, without status epilepticus    Other hyperlipidemia    Essential hypertension    Cavernoma     p/w  admitted on 10-28-20 for Nonintractable epilepsy with status epilepticus    . 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 hemiparesis and aphasia.  WILMAN 16:05 10/27/20.  On arrival, PT was noted to have R. facial and R. arm twitching.  Wife called and noted that PT's current presentation was consistent with his prior seizures and that he had 3 seizures prior to arrival which had lasted 2-4 minutes without return to full baseline.  She had given PT ativan 1 mg after second seizure but did not break the event.  Finally after third seizure with resultant R. hemiparesis, aphasia and urinary incontinence she sent the PT to ED.  In the ED, PT was hypertensive, hypoxic, and had NIHSS of 23 with GCS of 3-4.  CT head was done which noted no acute large mass, hemorrhage or new large territorial stroke.  CTA H&N done which noted no LVO.  CTP no core infarct, penumbra of 24mL (though in B/L frontal, posterior and cerebellum).  PT was given 2mg ativan 23:33 with no response.  Repeat 2mg ativan 23:53 with some improvement of myoclonic jerks but not to baseline.  PT then given loading dose of Keppra 2000mg with some improvement but still no return to baseline.  3rd line agent of Vimpat 200mg IV given x 1 without significant improvement.  Due to ongoing seizure >40 min despite 3 agents discussed with PT's wife regarding need for intubation and further seizure management for NCSE.  Initially, wife was reluctant due to prolonged intubation and perceived bad experience when Pt last needed to be intubated in 2018 after GTC.  After further discussion and understanding, wife agreed to intubation.  PT then intubated and started on propofol gtt at 40mcg/kg/min.  Lip myoclonic jerking/automatisms significantly improved but PT's BP rapidly dropped.  Rate reduced to 30 with persistent hypotension.  PT was accepted to NSCU for close monitoring.  Propofol paused for 5 minutes en route from ED to NSCU and by time PT was at NSCU BP significantly improved but automatisms of lip myoclonic rhythmic movements returned.    Per PT's wife, last seizure prior to this was in 2018 when had R. focal seizure with aphasia as well as GTC requiring intubation.  PT follows outpatient with Dr. Roberts with home regimen of VPA 500mg qAM + 750mg qHS, LEV 1000mg BID, PHT 400mg qHS 4 times a week alternating with PHT 300mg qHS 3 times a week.  PT is reportedly compliant with all meds, had recent increase stress but no significant sleep deprivation, no EtOH or drug abuse, no recent change in medications, head trauma or other changes.     HPI: 70y R-handed man with a notable PMH of HLD, HTN and L. temporal cavernoma s/p resection (2004) c/b focal epilepsy who presented to [facility] on 10/27/20 as a code stroke for R. hemiparesis and aphasia.  WILMAN 16:05 10/27/20.  On arrival, PT was noted to have R. facial and R. arm twitching.  Wife called and noted that PT's current presentation was consistent with his prior seizures and that he had 3 seizures prior to arrival which had lasted 2-4 minutes without return to full baseline.  She had given PT ativan 1 mg after second seizure but did not break the event.  Finally after third seizure with resultant R. hemiparesis, aphasia and urinary incontinence she sent the PT to ED.  In the ED, PT was hypertensive, hypoxic, and had NIHSS of 21 with GCS of 4.  CT head was done which noted no acute large mass, hemorrhage or new large territorial stroke.  CTA H&N done which noted no LVO.  CTP no core infarct, penumbra of 24mL (though in B/L frontal, posterior and cerebellum).  PT was given 2mg ativan 23:33 with no response.  Repeat 2mg ativan 23:53 with some improvement of myoclonic jerks but not to baseline.  PT then given loading dose of Keppra 2000mg with some improvement but still no return to baseline.  3rd line agent of Vimpat 200mg IV given x 1 without significant improvement.  Due to ongoing seizure >40 min despite 3 agents discussed with PT's wife regarding need for intubation and further seizure management for NCSE.  Initially, wife was reluctant due to prolonged intubation and perceived bad experience when Pt last needed to be intubated in 2018 after GTC.  After further discussion and understanding, wife agreed to intubation.  PT then intubated and started on propofol gtt at 40mcg/kg/min.  Lip myoclonic jerking/automatisms significantly improved but PT's BP rapidly dropped.  Rate reduced to 30 with persistent hypotension.  PT was accepted to NSCU for close monitoring.  Propofol paused for 5 minutes en route from ED to NSCU and by time PT was at NSCU BP significantly improved but automatisms of lip myoclonic rhythmic movements returned.    Per PT's wife, last seizure prior to this was in 2018 when had R. focal seizure with aphasia as well as GTC requiring intubation.  PT follows outpatient with Dr. Roberts with home regimen of VPA 500mg qAM + 750mg qHS, LEV 1000mg BID, PHT 400mg qHS 4 times a week alternating with PHT 300mg qHS 3 times a week.  PT is reportedly compliant with all meds, had recent increase stress but no significant sleep deprivation, no EtOH or drug abuse, no recent change in medications, head trauma or other changes.

## 2020-10-28 NOTE — EEG REPORT - NS EEG TEXT BOX
[facility]  Comprehensive Epilepsy Center  Report of Continuous Video EEG    [facility]: [address], [place] [address], Phone [phone]  [facility]: [address] [place], [place] [address] [address], Phone [phone]  [place] Office: [address], [address], [place] [address] Phone [phone]    [facility]: [address] [place] [address], Phone [phone]  [facility]: [address] [place] [address], Phone [phone]    Patient Name: Chele Aviles    Age: 70 year, : 1950  Patient ID: -, MRN #: [id number], Bess: [facility] 03 [facility] 03  Referring Physician: -  EEG #: [id number]    Study Time/Date: 5:10:47 AM on 10/28/2020  	  End Time/Date: 08:00 AM on 10/28/2020          			   Duration: 2H 50min    Study Information:    EEG Recording Technique:  The patient underwent continuous Video-EEG monitoring, using Telemetry System hardware on the XLTek Digital System. EEG and video data were stored on a computer hard drive with important events saved in digital archive files. The material was reviewed by a physician (electroencephalographer / epileptologist) on a daily basis. Alejandro and seizure detection algorithms were utilized and reviewed. An EEG Technician attended to the patient, and was available throughout daytime work hours.  The epilepsy center neurologist was available in person or on call 24-hours per day.    EEG Placement and Labeling of Electrodes:  The EEG was performed utilizing 20 channel referential EEG connections (coronal over temporal over parasagittal montage) using all standard 10-20 electrode placements with EKG, with additional electrodes placed in the inferior temporal region using the modified 10-10 montage electrode placements for elective admissions, or if deemed necessary. Recording was at a sampling rate of 256 samples per second per channel. Time synchronized digital video recording was done simultaneously with EEG recording. A low light infrared camera was used for low light recording.     History:   VEEG performed at bedside  COR: vented  NO PHOTIC / HV performed    69 y/o male  HO: HTN, HLD, Partial symptomatic epilepsy w/ complex patial seizure  PW: Epilepsy    Pertinent Medication  Lexapro  Depakote  fentanyl  cerebyx  ativan  Diprivan  keppra    Interpretation:    Daily EEG Visual Analysis      Study Interpretation:    FINDINGS:  There was no clear posterior dominant rhythm.  The background not clearly reactive to environmental stimuli and showed 2-4 second periods of discontinuity early in the study.  It mainly consisted of irregular delta and theta frequency activity with intermixed faster frequencies.    Sleep Background:  Stages of sleep could not be delineated.    Background Slowing:  Continuous diffuse irregular delta activity worse over the left anterior head region with accentuation of underlying activity (breach).     Other Paroxysmal Non-Epileptiform Findings:    None.    Epileptiform Activity:   None    Events:  No clinical events were recorded.  No seizures were recorded.    Activation Procedures:   -Hyperventilation was not performed.    -Photic stimulation was not performed.    Artifacts:  Intermittent myogenic and movement artifacts were noted.    EEG Classification:  Abnormal study  Moderate to severe diffuse slowing, more prominent over the left anterior head region  Left anterior breach artifact    Impression:  Findings indicate non-specific moderate to severe diffuse or multifocal cerebral dysfunction, worse on the left with skull defect on the left. There were no seizures.      Jaswinder Rendon MD  Attending Physician, [facility]

## 2020-10-28 NOTE — H&P ADULT - ASSESSMENT
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 sided hemiparesis later determined to be focal myoclonic seizure with impaired awareness with resultant clinical NCSE due to undetermined etiology. On exam, he was hypertensive, hypoxic requiring 2L O2 via NC, waxing between stupor and comatose state with only intermittent response to noxious stimuli of B/L UE and LE, had myoclonic R. facial, R. arm and R. hand twitching and R. gaze deviation with nystamoid jerks.  Occasionally had left hand myoclonic type jerking as well and B/L upgoing toes.  Noted to also have urinary incontinence.      Semiology 1: R. sided myoclonic jerks with aphasia and R. sided hemiparesis with impaired awareness +/- urinary incontinence lasting several minutes with 10-30 minutes post-ictal confusion  Semiology 2: GTC-->Last GTC 2018, previously required intubation.    LKN: 16:05  NIHSS: 12  GCS: 4  Baseline MRS: 0  Not a tPA candidate due to being outside LKN time and due to stroke not suspected  Not a thrombectomy candidate due to no LVO  CTH: Old L. temporal encephalomalacia, no new large strokes, masses or hemorrhage  CTA H&N: No LVO  CTP: 0 core, penumbra recorded as 24 mL (But notably in B/L fronal and occipital and cerebellar lobes --likely scan error), mismatch infinite.     IMPRESSION: Focal myoclonic seizure with impaired awareness with subsequent NCSE likely from L fronto-temporal etiology due to known focal epilepsy with unclear provoking trigger vs. breakthrough seizure. Last seizure 2 years prior  [] S/P IV Ativan 2mg x 2, Keppra 2g and 200mg IV Vimpat without return to baseline or resolution of myoclonic twitching.    [] Discussed with his health care proxy, Jeannie (wife) [phone], about risks and benefits of intubation and she agreed to proceed.   [] PT subsequently intubated for refractory NCSE, should start propofol gtt adjust as needed per clinical and/or EEG correlation  [] Check CPK, lactate, VBG, UA, Urine Cx, CXR, Urine tox, EtOH level, COVID-19, Blood cultures x 2 to search for provoking factors. PHT levels and VPA levels (to ensure therapeutic) and LEV levels to ensure PT was taking (Wife says was very comliant)  [] Home AED:  -Daily  mg 10:00 AM, 1500mg (2 tabs 750) 22:00  -Daily LEV 1000mg BID  -4 times a week PHT 400mg 10:00 AM  -3 times a week PHT 350mg 22:00     Assessment and plan discussed with epilepsy fellow, Dr. Drake, and with neuro ICU attending, Dr. 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 sided hemiparesis later determined to be focal myoclonic seizure with impaired awareness with resultant clinical NCSE due to undetermined etiology. On exam, he was hypertensive, hypoxic requiring 2L O2 via NC, waxing between stupor and comatose state with only intermittent response to noxious stimuli of B/L UE and LE, had myoclonic R. facial, R. arm and R. hand twitching and R. gaze deviation with nystamoid jerks.  Occasionally had left hand myoclonic type jerking as well and B/L upgoing toes.  Noted to also have urinary incontinence.      Semiology 1: R. sided myoclonic jerks with aphasia and R. sided hemiparesis with impaired awareness +/- urinary incontinence lasting several minutes with 10-30 minutes post-ictal confusion  Semiology 2: GTC-->Last GTC 2018, previously required intubation.    LKN: 16:05  NIHSS: 21  GCS: 4  Baseline MRS: 0  Not a tPA candidate due to being outside LKN time and due to stroke not suspected  Not a thrombectomy candidate due to no LVO  CTH: Old L. temporal encephalomalacia, no new large strokes, masses or hemorrhage  CTA H&N: No LVO  CTP: 0 core, penumbra recorded as 24 mL (But notably in B/L fronal and occipital and cerebellar lobes --likely scan error), mismatch infinite.     IMPRESSION: Focal myoclonic seizure with impaired awareness with subsequent NCSE likely from L fronto-temporal etiology due to known focal epilepsy with unclear provoking trigger vs. breakthrough seizure. Last seizure 2 years prior    [] S/P IV Ativan 2mg x 2, Keppra 2g and 200mg IV Vimpat without return to baseline or resolution of myoclonic twitching.    [] Discussed with his health care proxy, Jeannie (wife) [phone], about risks and benefits of intubation and she agreed to proceed.   [] PT subsequently intubated for refractory NCSE, started on propofol gtt which improved clinical events.    [] PHT level subtherapeutic at 9.7, VPA level therapeutic at 70 with normal albumin.-->Epilepsy neurology recs that given PT p/w NCSE, recommended LD of 200mg IV fos-PHT and then continuation of home dose PHT converted to fos-PHT.   []Will continue other home dose meds IV form.  ( IV qAM, 750 IV qHS | LEV 1000mg IV BID).   []Check CPK, lactate, VBG, UA, Urine Cx, CXR, Urine tox, EtOH level, COVID-19, Blood cultures x 2 to search for provoking factors. PHT levels and VPA levels (to ensure therapeutic) and LEV levels to ensure PT was taking (Wife says was very comliant)  [] Home AED:  -Daily  mg 10:00 AM, 1500mg (2 tabs 750) 22:00  -Daily LEV 1000mg BID  -4 times a week PHT 400mg 22:00, 3 times a week PHT 350mg 22:00     Assessment and plan discussed with epilepsy fellow, Dr. Drake, and with neuro ICU attending, Dr. Dai Tabor, DO  PGY-3 Neurology Service

## 2020-10-28 NOTE — AIRWAY REMOVAL NOTE  ADULT & PEDS - ARTIFICAL AIRWAY REMOVAL COMMENTS
Written order for extubation verified. The patient was identified by full name and birth date compared to the identification band. Pt was extubated being agitated upon awakening. Present during the procedure was ANKITA White, ANKITA Orellana, JEFFERY Le.

## 2020-10-28 NOTE — PROGRESS NOTE ADULT - SUBJECTIVE AND OBJECTIVE BOX
SUMMARY:   70 year-old right-handed man with history of HTN, HLD and left temporal cavernoma s/p resection (2004) c/b focal epilepsy who presented to [facility] ED with aphasia and right sided hemiparesis on 10/27/20. He was initially managed as a Stroke Code; imaging without acute findings. He was subsequently noted to have right sided twitching movements with right gaze deviation. On exam, he was noted to be hypertensive, hypoxic requiring 2L O2 via NC and alternating between stupor and comatose state with only intermittent response to noxious stimuli associated with myoclonic right facial, arm and hand twitching with right gaze deviation and nystamoid jerks. He reportedly had occasional left hand myoclonic type jerking as well with bilateral upgoing toes. He also had urinary incontinence.  He was given a total of 4mg lorazepam (2mg x 2), 2g of levetiracetam and 200mg lacosamide. Despite this, he continued to have poor mental status and oral twitching. He was intubated in the ED and started on propofol, which aborted abnormal movements. However, he became hypotensive requiring pausing of propofol. SBP rebounded, but lip automatism recurred. He was then placed on phenylephrine drip concomitantly with propofol drip with resultant MAP >65mmHg and cessation of lip twitching.   Of note, patient's seizures started after cavernoma resection and he has reportedly been seizure free for ~ 2 years and had been taking his antiepileptics. His seizure semiologies are as follows:  Semiology 1: right sided myoclonic jerks with aphasia and right sided hemiparesis with impaired awareness +/- urinary incontinence lasting several minutes with 10-30 minutes post-ictal confusion  Semiology 2: GTC-->Last GTC 2018, previously required intubation.  His home AED regimen is as follows:  -Daily  mg 10:00 AM, 1500mg (2 tabs 750mg) 22:00 PM  -Daily LEV 1000mg BID  -4 times a week PHT 400mg 10:00 AM  -3 times a week PHT 350mg 22:00     24 HOUR EVENTS:  Admitted to NSCU. Started on phenylephrine gtt for propofol related hypotension.     VITALS/DATA/ORDERS: [x] Reviewed    EXAMINATION:   When propofol was paused during episode of hypotension, was spontaneously moving all limbs strongly but did not open eyes nor follow commands; had oral twitching movements  On propofol, pupils are reactive and all limbs withdraw  Lungs clear, heart regular, abdomen soft with +BS

## 2020-10-28 NOTE — ED ADULT NURSE NOTE - SKIN TEMPERATURE
Anesthetic History   No history of anesthetic complications            Review of Systems / Medical History  Patient summary reviewed, nursing notes reviewed and pertinent labs reviewed    Pulmonary  Within defined limits                 Neuro/Psych         Psychiatric history     Cardiovascular    Hypertension: well controlled        Dysrhythmias : atrial fibrillation    Pertinent negatives: No CAD  Exercise tolerance: >4 METS     GI/Hepatic/Renal  Within defined limits              Endo/Other    Diabetes: well controlled, type 2    Obesity and arthritis     Other Findings              Physical Exam    Airway  Mallampati: II      Mouth opening: Normal     Cardiovascular  Regular rate and rhythm,  S1 and S2 normal,  no murmur, click, rub, or gallop             Dental  No notable dental hx       Pulmonary  Breath sounds clear to auscultation               Abdominal         Other Findings            Anesthetic Plan    ASA: 3  Anesthesia type: spinal - femoral single shot      Post-op pain plan if not by surgeon: peripheral nerve block single      Anesthetic plan and risks discussed with: Patient warm

## 2020-10-28 NOTE — ED ADULT NURSE REASSESSMENT NOTE - NS ED NURSE REASSESS COMMENT FT1
Pt intubated, MD Daniel at bedside. Tube 7.5, 25 at the lip line, clear breath sounds auscultated bilaterally by MD Francois, appropriate color change, chest x-ray to be ordered to confirm tube placement.

## 2020-10-28 NOTE — PHYSICAL THERAPY INITIAL EVALUATION ADULT - ASR WT BEARING STATUS EVAL
no weight-bearing restrictions/CONT: On exam, he was hypertensive, hypoxic requiring 2L O2 via NC, waxing between stupor and comatose state with only intermittent response to noxious stimuli of B/L UE and LE, had myoclonic R. facial, R. arm and R. hand twitching and R. gaze deviation with nystamoid jerks.  Occasionally had left hand myoclonic type jerking as well and B/L upgoing toes.  Noted to also have urinary incontinence.  On 10/27 - CT angiography neck: No hemodynamically significant stenosis by NASCET criteria. No vascular dissection. CT angiography brain: No major vessel occlusion or proximal stenosis. No evidence of aneurysm or other vascular malformation. CT perfusion: 0 cc penumbra, 0 cc infarct core. CXR on 10/28 - Inspiratory effort. The heart is normal in size. The lungs are clear. An NG tube is in good position. Endotracheal tube was removed. No pleural effusion. No pneumothorax.

## 2020-10-28 NOTE — H&P ADULT - NSHPLABSRESULTS_GEN_ALL_CORE
15.6   11.94 )-----------( 243      ( 27 Oct 2020 23:38 )             45.0   10-27    140  |  101  |  8   ----------------------------<  129<H>  4.3   |  27  |  0.75    Ca    9.0      27 Oct 2020 23:38    TPro  6.9  /  Alb  4.5  /  TBili  0.3  /  DBili  x   /  AST  18  /  ALT  12  /  AlkPhos  70  10-27    Valproic Acid Level, Serum: 70 ug/mL (10.28.20 @ 01:07)   Phenytoin Level, Serum: 9.7 ug/mL (10.28.20 @ 01:00)   Blood Gas Venous - Lactate: 2.0 mmoL/L (08.18.17 @ 10:42)     (10.28.20 @ 02:29) UA: Large hematuria with 662/hpf, no LE or nitrites  Alcohol, Blood: NEG mg/dL (10.28.20 @ 00:59)     10/27/20 CT Brain Stroke Protocol (10.27.20 @ 23:49) Status post left frontotemporal craniotomy, with adjacent left anterior temporal and and encephalomalacia/gliosis again evident.  Prominence of the ventricles with stable ventricular size. There is no intraparenchymal hematoma, mass effect or midline shift. No abnormal extra-axial fluid collections are present. No acute intracranial hemorrhage, mass effect, or other acute intracranial pathology. Prominent ventricles that are stable in size.    10/27/20 CTA neck: No hemodynamically significant stenosis by NASCET criteria. No vascular dissection.  10/27/20 CTA Brain: No major vessel occlusion or proximal stenosis. No evidence of aneurysm or other vascular malformation.    10/27/20 CTP Brain 10/27/20 CBF <30%:? 0?, CBV less than 34%: 0 mL, Tmax > 6s:? 0 cc., Mismatch volume:0 15.6   11.94 )-----------( 243      ( 27 Oct 2020 23:38 )             45.0   10-27    140  |  101  |  8   ----------------------------<  129<H>  4.3   |  27  |  0.75    Ca    9.0      27 Oct 2020 23:38     TPro  6.9  /  Alb  4.5  /  TBili  0.3  /  DBili  x   /  AST  18  /  ALT  12  /  AlkPhos  70  10-27    Valproic Acid Level, Serum: 70 ug/mL (10.28.20 @ 01:07)   Phenytoin Level, Serum: 9.7 ug/mL (10.28.20 @ 01:00)   Blood Gas Venous - Lactate: 2.0 mmoL/L (08.18.17 @ 10:42)     (10.28.20 @ 02:29) UA: Large hematuria with 662/hpf, no LE or nitrites  Alcohol, Blood: NEG mg/dL (10.28.20 @ 00:59)     10/27/20 CT Brain Stroke Protocol (10.27.20 @ 23:49) Status post left frontotemporal craniotomy, with adjacent left anterior temporal and and encephalomalacia/gliosis again evident.  Prominence of the ventricles with stable ventricular size. There is no intraparenchymal hematoma, mass effect or midline shift. No abnormal extra-axial fluid collections are present. No acute intracranial hemorrhage, mass effect, or other acute intracranial pathology. Prominent ventricles that are stable in size.    10/27/20 CTA neck: No hemodynamically significant stenosis by NASCET criteria. No vascular dissection.  10/27/20 CTA Brain: No major vessel occlusion or proximal stenosis. No evidence of aneurysm or other vascular malformation.    10/27/20 CTP Brain 10/27/20 CBF <30%:? 0?, CBV less than 34%: 0 mL, Tmax > 6s:? 0 cc., Mismatch volume:0

## 2020-10-28 NOTE — CONSULT NOTE ADULT - SUBJECTIVE AND OBJECTIVE BOX
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 hemiparesis and aphasia.  WILMAN 16:05 10/27/20.  On arrival, PT was noted to have R. facial and R. arm twitching.  Wife called and noted that PT's current presentation was consistent with his prior seizures and that he had 3 seizures prior to arrival which had lasted 2-4 minutes without return to full baseline.  She had given PT ativan 1 mg after second seizure but did not break the event.  Finally after third seizure with resultant R. hemiparesis, aphasia and urinary incontinence she sent the PT to ED.  In the ED, PT was hypertensive, hypoxic, and had NIHSS of 21 with GCS of 4.  CT head was done which noted no acute large mass, hemorrhage or new large territorial stroke.  CTA H&N done which noted no LVO.  CTP no core infarct, penumbra of 24mL (though in B/L frontal, posterior and cerebellum).  PT was given 2mg ativan 23:33 with no response.  Repeat 2mg ativan 23:53 with some improvement of myoclonic jerks but not to baseline.  PT then given loading dose of Keppra 2000mg with some improvement but still no return to baseline.  3rd line agent of Vimpat 200mg IV given x 1 without significant improvement.  Due to ongoing seizure >40 min despite 3 agents discussed with PT's wife regarding need for intubation and further seizure management for NCSE.  Initially, wife was reluctant due to prolonged intubation and perceived bad experience when Pt last needed to be intubated in 2018 after GTC.  After further discussion and understanding, wife agreed to intubation.  PT then intubated and started on propofol gtt at 40mcg/kg/min.  Lip myoclonic jerking/automatisms significantly improved but PT's BP rapidly dropped.  Rate reduced to 30 with persistent hypotension.  PT was accepted to [facility] for close monitoring.  Propofol paused for 5 minutes en route from ED to NSCU and by time PT was at [facility] BP significantly improved but automatisms of lip myoclonic rhythmic movements returned.    Per PT's wife, last seizure prior to this was in 2018 when had R. focal seizure with aphasia as well as GTC requiring intubation.  PT follows outpatient with Dr. Roberts with home regimen of VPA 500mg qAM + 750mg qHS, LEV 1000mg BID, PHT 400mg qHS 4 times a week alternating with PHT 300mg qHS 3 times a week.  PT is reportedly compliant with all meds, had recent increase stress but no significant sleep deprivation, no EtOH or drug abuse, no recent change in medications, head trauma or other changes.     CC: Patient is a 70y old  Male who presents with a chief complaint of Status epilepticus (28 Oct 2020 06:55)    HPI:   MAURO NUNEZ is a 70y year old **-handed Male with a PMH of *** who presented on 10-28-20 with ***.      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 hemiparesis and aphasia.  LKN 16:05 10/27/20.  On arrival, PT was noted to have R. facial and R. arm twitching.  Wife called and noted that PT's current presentation was consistent with his prior seizures and that he had 3 seizures prior to arrival which had lasted 2-4 minutes without return to full baseline.  She had given PT ativan 1 mg after second seizure but did not break the event.  Finally after third seizure with resultant R. hemiparesis, aphasia and urinary incontinence she sent the PT to ED.  In the ED, PT was hypertensive, hypoxic, and had NIHSS of 21 with GCS of 4.  CT head was done which noted no acute large mass, hemorrhage or new large territorial stroke.  CTA H&N done which noted no LVO.  CTP no core infarct, penumbra of 24mL (though in B/L frontal, posterior and cerebellum).  PT was given 2mg ativan 23:33 with no response.  Repeat 2mg ativan 23:53 with some improvement of myoclonic jerks but not to baseline.  PT then given loading dose of Keppra 2000mg with some improvement but still no return to baseline.  3rd line agent of Vimpat 200mg IV given x 1 without significant improvement.  Due to ongoing seizure >40 min despite 3 agents discussed with PT's wife regarding need for intubation and further seizure management for NCSE.  Initially, wife was reluctant due to prolonged intubation and perceived bad experience when Pt last needed to be intubated in 2018 after GTC.  After further discussion and understanding, wife agreed to intubation.  PT then intubated and started on propofol gtt at 40mcg/kg/min.  Lip myoclonic jerking/automatisms significantly improved but PT's BP rapidly dropped.  Rate reduced to 30 with persistent hypotension.  PT was accepted to NSCU for close monitoring.  Propofol paused for 5 minutes en route from ED to NSCU and by time PT was at NSCU BP significantly improved but automatisms of lip myoclonic rhythmic movements returned.    Per PT's wife, last seizure prior to this was in 2018 when had R. focal seizure with aphasia as well as GTC requiring intubation.  PT follows outpatient with Dr. Roberts with home regimen of VPA 500mg qAM + 750mg qHS, LEV 1000mg BID, PHT 400mg qHS 4 times a week alternating with PHT 300mg qHS 3 times a week.  PT is reportedly compliant with all meds, had recent increase stress but no significant sleep deprivation, no EtOH or drug abuse, no recent change in medications, head trauma or other changes.     (28 Oct 2020 02:39)    PMH:  L temporal cavernoma S/p resection 2004 c/b epilepsy  HTN  HLD  Focal epilepsy   Status epilepticus requiring intubation (2018)    PAST SURGICAL HISTORY:  Status post craniectomy .     FAMILY HISTORY:  No pertinent family history in first degree relatives.     Soc: Denies tobacco, alcohol or drug use.  Lives with wifeJeannie (HCP)    MEDICATIONS  Home Medications:  divalproex sodium 250 mg oral delayed release tablet: 3 tab(s) orally once a day (at bedtime) (20 Aug 2017 15:12)  divalproex sodium 500 mg oral delayed release tablet: 1 tab(s) orally once a day 10:00 AM (28 Oct 2020 05:38)  finasteride 5 mg oral tablet: 1 tab(s) orally once a day (18 Aug 2017 14:05)  levETIRAcetam 1000 mg oral tablet: 1 tab(s) orally 2 times a day (20 Aug 2017 15:12)  Lexapro 10 mg oral tablet: 1 tab(s) orally once a day (18 Aug 2017 14:05)  LORazepam 1 mg oral tablet: 1 tab(s) orally once a day, As Needed for seizure (18 Aug 2017 14:06)  losartan 50 mg oral tablet: 1 tab(s) orally once a day (18 Aug 2017 14:06)  simvastatin 10 mg oral tablet: 1 tab(s) orally once a day (at bedtime) (18 Aug 2017 14:06)    MEDICATIONS  (STANDING):  chlorhexidine 4% Liquid 1 Application(s) Topical <User Schedule>  diVALproex DR 1500 milliGRAM(s) Oral <User Schedule>  diVALproex  milliGRAM(s) Oral <User Schedule>  enoxaparin Injectable 40 milliGRAM(s) SubCutaneous <User Schedule>  escitalopram 10 milliGRAM(s) Oral daily  finasteride 5 milliGRAM(s) Oral daily  lacosamide Solution 200 milliGRAM(s) Oral two times a day  levETIRAcetam  IVPB 1500 milliGRAM(s) IV Intermittent every 12 hours  losartan 50 milliGRAM(s) Oral daily  pantoprazole  Injectable 40 milliGRAM(s) IV Push daily  phenytoin   Suspension 400 milliGRAM(s) Oral <User Schedule>  phenytoin   Suspension 360 milliGRAM(s) Oral <User Schedule>  polyethylene glycol 3350 17 Gram(s) Oral every 12 hours  senna 2 Tablet(s) Oral at bedtime  simvastatin 10 milliGRAM(s) Oral at bedtime  sodium chloride 0.9%. 1000 milliLiter(s) (75 mL/Hr) IV Continuous <Continuous>    MEDICATIONS  (PRN):  fosphenytoin IVPB 1900 milliGRAM(s) PE IV Intermittent once PRN Seizure refractory to Keppra loading dose    ALLERGIES/INTOLERANCES: NKDA     OBJECTIVE:  VITALS   Vital Signs Last 24 Hrs  T(C): 36.6 (28 Oct 2020 07:00), Max: 37.6 (28 Oct 2020 01:46)  T(F): 97.9 (28 Oct 2020 07:00), Max: 99.7 (28 Oct 2020 01:46)  HR: 96 (28 Oct 2020 08:20) (59 - 97)  BP: 184/117 (28 Oct 2020 07:30) (96/68 - 197/119)  BP(mean): 130 (28 Oct 2020 07:30) (78 - 140)  RR: 16 (28 Oct 2020 07:45) (16 - 36)  SpO2: 96% (28 Oct 2020 08:20) (88% - 100%)    Physical exam on arrival:  General: Sitting up in stretcher, mild respiratory distress but initially protecting airway.   Cardiac: Hypertensive, RR, no murmurs, rubs or gallops.   Resp: Desaturation to 86% on RA, improved to 95% on 2L.  Upper airway restrictive lung sounds on auscultation.    Mental Status: Stuporous with no response to noxious stimuli in all 4 extremities, unintelligible verbal output, unable to repeat, name or attend to examiner.  Not following any commands.   Cranial Nerves: PERRL (R = 3mm, L = 3mm) prior to medications, afterwards 2mm OU equally reactive. Visual fields intact via blinks to threat OU.  EOMI.  Initially midline gaze but had intermittent R. forced gaze with nystagmoid jerks lasting 2-3 minutes at a time.  Mild R. facial palsy with nasolabial fold flattening.  Intermittent myoclonic R. facial twitching.  Tongue midline, no tongue bite.  Initially protecting airway but after prolonged seizure lost gag reflex.  B/L corneal reflex intact, B/L VOR intact.    Motor: Normal muscle bulk & tone.  Persistent rhythmic myoclonic jerks of RUE as well as L. hand.  Improved after 4mg ativan and Keppra loading dose but not completely resolved until propofol gtt.  At best was antigravity in both arms moving legs within plane of bed.  	  Sensation: No response to noxious stimuli throughout  Reflexes: 2/4 throughout.  Plantar Responses were upgoing B/L at start of exam and then persistent R upgoing but mute on L    LABORATORY:  CBC                       15.6   11.94 )-----------( 243      ( 27 Oct 2020 23:38 )             45.0     Chem 10-27    140  |  101  |  8   ----------------------------<  129<H>  4.3   |  27  |  0.75    Ca    9.0      27 Oct 2020 23:38    TPro  6.9  /  Alb  4.5  /  TBili  0.3  /  DBili  x   /  AST  18  /  ALT  12  /  AlkPhos  70  10-27      Coagulopathy PT/INR - ( 27 Oct 2020 23:38 )   PT: 12.3 sec;   INR: 1.03 ratio  PTT - ( 27 Oct 2020 23:38 )  PTT:34.9 sec    U/A Urinalysis Basic - ( 28 Oct 2020 02:29 )  Color: Pink / Appearance: Slightly Turbid / S.026 / pH: x  Gluc: x / Ketone: Negative  / Bili: Negative / Urobili: Negative   Blood: x / Protein: 100 mg/dL / Nitrite: Negative   Leuk Esterase: Negative / RBC: 662 /hpf / WBC 3 /HPF   Sq Epi: x / Non Sq Epi: 2 / Bacteria: Negative    Valproic Acid Level, Serum: 70 ug/mL (10.28.20 @ 01:07)   Phenytoin Level, Serum: 9.7 ug/mL (10.28.20 @ 01:00)   Blood Gas Venous - Lactate: 2.0 mmoL/L (17 @ 10:42)     (10.28.20 @ 02:29) UA: Large hematuria with 662/hpf, no LE or nitrites  Alcohol, Blood: NEG mg/dL (10.28.20 @ 00:59)     10/27/20 CT Brain Stroke Protocol (10.27.20 @ 23:49) Status post left frontotemporal craniotomy, with adjacent left anterior temporal and and encephalomalacia/gliosis again evident.  Prominence of the ventricles with stable ventricular size. There is no intraparenchymal hematoma, mass effect or midline shift. No abnormal extra-axial fluid collections are present. No acute intracranial hemorrhage, mass effect, or other acute intracranial pathology. Prominent ventricles that are stable in size.    10/27/20 CTA neck: No hemodynamically significant stenosis by NASCET criteria. No vascular dissection.  10/27/20 CTA Brain: No major vessel occlusion or proximal stenosis. No evidence of aneurysm or other vascular malformation.    10/27/20 CTP Brain 10/27/20 CBF <30%:? 0?, CBV less than 34%: 0 mL, Tmax > 6s:? 0 cc., Mismatch volume:0

## 2020-10-28 NOTE — PHYSICAL THERAPY INITIAL EVALUATION ADULT - SENSORY TESTS
Sensation grossly intact to light touch. Unable to accurately assess discriminative touch and proprioception 2/2 questionable cognitive deficits.

## 2020-10-28 NOTE — PROGRESS NOTE ADULT - ASSESSMENT
ASSESSMENT/PLAN: Status epilepticus requiring intubation and IV sedation    NEURO:  Seizures: Called EEG tech to hook patient up to EEG to r/o ongoing NCSE; continue propofol as clinical seizures appear controlled at current dose; wean propofol once on EEG; continue home AEDs plus lacosamide; f/u AED levels and adjust dosages as needed  Check CPK, lactate, VBG, UA, Urine Cx, CXR, Urine tox, EtOH level, COVID-19, Blood cultures x 2 to search for provoking factors  Once stabilized, consider functional NSGY consult re: seizure focus resection     PULM:   Vent support for now; wean once propofol weaned  Aspiration precautions  VAP bundle    CARDS:  MAP >65mmHg with pressor support as needed  If escalating pressor requirement, will switch propofol to midazolam gtt    RENAL:  Fluids: IVF  Monitor urine output    GI:  Diet: NPO for now  Place NGT for medication administration  GI prophylaxis [] not indicated [x] PPI: intubated [] other:  Bowel regimen     ENDO:   Goal euglycemia (-180)    HEME/ONC:  VTE prophylaxis: [x] SCDs [x] chemoprophylaxis [] hold chemoprophylaxis due to: [] high risk of DVT/PE on admission due to:    ID:  Monitor for fever; r/o infection    SOCIAL/FAMILY:  [x] awaiting [] updated at bedside [] family meeting    CODE STATUS:  [x] Full Code [] DNR [] DNI [] Palliative/Comfort Care    DISPOSITION:  [x] ICU [] Stroke Unit [] Floor [] EMU [] RCU [] PCU    [x] Patient is at high risk of neurologic deterioration/death due to: status epilepticus    Contact: [phone]   ASSESSMENT/PLAN: Status epilepticus requiring intubation, history of cavernous malformation , likely the seizure focus     NEURO:  neuro checks q 4 hr   seizure precaution   Seizures: continue dilantin, keppra, and depakote , will give 200 mg of dilantin as it was subtherapeutic   Moderate to severe diffuse slowing, more prominent over the left anterior head region  transfer to EMU when stable     PULM:   placed on PS and extubated successfully   currently on NC   Aspiration precautions  VAP bundle     CARDS:  MAP >65mmHg, off phenylephrine   resume losartan 50 mg daily     RENAL:  Fluids: NS 75 ml/hr until he can tolerate PO intake   Monitor urine output    GI:  Diet: NPO for now  can start TF tonight if stable     GI prophylaxis [] not indicated [x] PPI: intubated [] other:  Bowel regimen     ENDO:   Goal euglycemia (-180)    HEME/ONC:  VTE prophylaxis: [x] SCDs [x] chemoprophylaxis [] hold chemoprophylaxis due to: [] high risk of DVT/PE on admission due to:    ID:  Monitor for fever; r/o infection    SOCIAL/FAMILY:  [x] awaiting [] updated at bedside [] family meeting    CODE STATUS:  [x] Full Code [] DNR [] DNI [] Palliative/Comfort Care    DISPOSITION:  [x] ICU [] Stroke Unit [] Floor [] EMU [] RCU [] PCU    [x] Patient is at high risk of neurologic deterioration/death due to: status epilepticus    Contact: [phone]   ASSESSMENT/PLAN: Status epilepticus requiring intubation, history of cavernous malformation , likely the seizure focus     NEURO:  neuro checks q 4 hr   seizure precaution   Seizures: continue dilantin, keppra, and depakote , will give 200 mg of dilantin as it was subtherapeutic   Moderate to severe diffuse slowing, more prominent over the left anterior head region  transfer to EMU when stable     PULM:   respiratory failure from status   placed on PS and extubated successfully   currently on NC   Aspiration precautions  VAP bundle     CARDS:  MAP >65mmHg, off phenylephrine   resume losartan 50 mg daily     RENAL:  Fluids: NS 75 ml/hr until he can tolerate PO intake   Monitor urine output    GI:  Diet: NPO for now  can start TF tonight if stable     GI prophylaxis [] not indicated [x] PPI: intubated [] other:  Bowel regimen     ENDO:   Goal euglycemia (-180)    HEME/ONC:  VTE prophylaxis: [x] SCDs [x] chemoprophylaxis [] hold chemoprophylaxis due to: [] high risk of DVT/PE on admission due to:    ID:  Monitor for fever; r/o infection    SOCIAL/FAMILY:  [x] awaiting [] updated at bedside [] family meeting    CODE STATUS:  [x] Full Code [] DNR [] DNI [] Palliative/Comfort Care    DISPOSITION:  [x] ICU [] Stroke Unit [] Floor [] EMU [] RCU [] PCU    [x] Patient is at high risk of neurologic deterioration/death due to: status epilepticus    Contact: [phone]

## 2020-10-28 NOTE — ED ADULT NURSE NOTE - CAS EDN DISCHARGE INTERVENTIONS
Arm band on/pt insurance card and license with patient in belongings bag with patient labels/IV intact

## 2020-10-28 NOTE — PROGRESS NOTE ADULT - SUBJECTIVE AND OBJECTIVE BOX
SUMMARY:   70 year-old right-handed man with history of HTN, HLD and left temporal cavernoma s/p resection (2004) c/b focal epilepsy who presented to [facility] ED with aphasia and right sided hemiparesis on 10/27/20. He was initially managed as a Stroke Code; imaging without acute findings. He was subsequently noted to have right sided twitching movements with right gaze deviation. On exam, he was noted to be hypertensive, hypoxic requiring 2L O2 via NC and alternating between stupor and comatose state with only intermittent response to noxious stimuli associated with myoclonic right facial, arm and hand twitching with right gaze deviation and nystamoid jerks. He reportedly had occasional left hand myoclonic type jerking as well with bilateral upgoing toes. He also had urinary incontinence.  He was given a total of 4mg lorazepam (2mg x 2), 2g of levetiracetam and 200mg lacosamide. Despite this, he continued to have poor mental status and oral twitching. He was intubated in the ED and started on propofol, which aborted abnormal movements. However, he became hypotensive requiring pausing of propofol. SBP rebounded, but lip automatism recurred. He was then placed on phenylephrine drip concomitantly with propofol drip with resultant MAP >65mmHg and cessation of lip twitching.   Of note, patient's seizures started after cavernoma resection and he has reportedly been seizure free for ~ 2 years and had been taking his antiepileptics. His seizure semiologies are as follows:  Semiology 1: right sided myoclonic jerks with aphasia and right sided hemiparesis with impaired awareness +/- urinary incontinence lasting several minutes with 10-30 minutes post-ictal confusion  Semiology 2: GTC-->Last GTC 2018, previously required intubation.  His home AED regimen is as follows:   / affect-Daily  mg 10:00 AM, 1500mg (2 tabs 750mg) 22:00 PM  -Daily LEV 1000mg BID  -4 times a week PHT 400mg 10:00 AM  -3 times a week PHT 350mg 22:00     24 HOUR EVENTS:  Admitted to NSCU. Started on phenylephrine gtt for propofol related hypotension.     VITALS/DATA/ORDERS: [x] Reviewed      Gen: awake, alert  HENT: neck soft / supple  Lymph: no LAD noted in neck  Eye: PERRL, sclerae anicteric  CV: normal rate, regular rhythm  Pulm: CTAB, no w/r/r auscultated  Abd: +BS, soft, NT, ND  Skin: warm, dry  Ext: no LE edema  Neuro:   Psych: normal mood      ICU Vital Signs Last 24 Hrs  T(C): 37.2 (28 Oct 2020 03:00), Max: 37.6 (28 Oct 2020 01:46)  T(F): 99 (28 Oct 2020 03:00), Max: 99.7 (28 Oct 2020 01:46)  HR: 63 (28 Oct 2020 06:45) (59 - 97)  BP: 108/71 (28 Oct 2020 06:45) (96/68 - 197/119)  BP(mean): 82 (28 Oct 2020 06:45) (78 - 140)  ABP: --  ABP(mean): --  RR: 16 (28 Oct 2020 06:45) (16 - 36)  SpO2: 100% (28 Oct 2020 06:45) (88% - 100%)      10-27-20 @ 07:01  -  10-28-20 @ 06:56  --------------------------------------------------------  IN: 674.7 mL / OUT: 165 mL / NET: 509.7 mL      Mode: AC/ CMV (Assist Control/ Continuous Mandatory Ventilation), RR (machine): 16, TV (machine): 550, FiO2: 60, PEEP: 5, ITime: 1, MAP: 11, PIP: 29  chlorhexidine 0.12% Liquid 15 milliLiter(s) Oral Mucosa every 12 hours  chlorhexidine 4% Liquid 1 Application(s) Topical <User Schedule>  diVALproex DR 1500 milliGRAM(s) Oral <User Schedule>  diVALproex  milliGRAM(s) Oral <User Schedule>  enoxaparin Injectable 40 milliGRAM(s) SubCutaneous <User Schedule>  escitalopram 10 milliGRAM(s) Oral daily  finasteride 5 milliGRAM(s) Oral daily  fosphenytoin IVPB 1900 milliGRAM(s) PE IV Intermittent once PRN  lacosamide Solution 200 milliGRAM(s) Oral two times a day  levETIRAcetam  IVPB 1500 milliGRAM(s) IV Intermittent every 12 hours  losartan 50 milliGRAM(s) Oral daily  pantoprazole  Injectable 40 milliGRAM(s) IV Push daily  phenylephrine    Infusion 0.1 MICROgram(s)/kG/Min (3.6 mL/Hr) IV Continuous <Continuous>  phenytoin   Suspension 400 milliGRAM(s) Oral <User Schedule>  phenytoin   Suspension 360 milliGRAM(s) Oral <User Schedule>  polyethylene glycol 3350 17 Gram(s) Oral every 12 hours  propofol Infusion. 30 MICROgram(s)/kG/Min (17.3 mL/Hr) IV Continuous <Continuous>  senna 2 Tablet(s) Oral at bedtime  simvastatin 10 milliGRAM(s) Oral at bedtime  sodium chloride 0.9%. 1000 milliLiter(s) (75 mL/Hr) IV Continuous <Continuous>                            15.6   11.94 )-----------( 243      ( 27 Oct 2020 23:38 )             45.0     10-27    140  |  101  |  8   ----------------------------<  129<H>  4.3   |  27  |  0.75    Ca    9.0      27 Oct 2020 23:38    TPro  6.9  /  Alb  4.5  /  TBili  0.3  /  DBili  x   /  AST  18  /  ALT  12  /  AlkPhos  70  10-27    LIVER FUNCTIONS - ( 27 Oct 2020 23:38 )  Alb: 4.5 g/dL / Pro: 6.9 g/dL / ALK PHOS: 70 U/L / ALT: 12 U/L / AST: 18 U/L / GGT: x                  SUMMARY:   70 year-old right-handed man with history of HTN, HLD and left temporal cavernoma s/p resection (2004) c/b focal epilepsy who presented to [facility] ED with aphasia and right sided hemiparesis on 10/27/20. He was initially managed as a Stroke Code; imaging without acute findings. He was subsequently noted to have right sided twitching movements with right gaze deviation. On exam, he was noted to be hypertensive, hypoxic requiring 2L O2 via NC and alternating between stupor and comatose state with only intermittent response to noxious stimuli associated with myoclonic right facial, arm and hand twitching with right gaze deviation and nystamoid jerks. He reportedly had occasional left hand myoclonic type jerking as well with bilateral upgoing toes. He also had urinary incontinence.  He was given a total of 4mg lorazepam (2mg x 2), 2g of levetiracetam and 200mg lacosamide. Despite this, he continued to have poor mental status and oral twitching. He was intubated in the ED and started on propofol, which aborted abnormal movements. However, he became hypotensive requiring pausing of propofol. SBP rebounded, but lip automatism recurred. He was then placed on phenylephrine drip concomitantly with propofol drip with resultant MAP >65mmHg and cessation of lip twitching.   Of note, patient's seizures started after cavernoma resection and he has reportedly been seizure free for ~ 2 years and had been taking his antiepileptics. His seizure semiologies are as follows:  Semiology 1: right sided myoclonic jerks with aphasia and right sided hemiparesis with impaired awareness +/- urinary incontinence lasting several minutes with 10-30 minutes post-ictal confusion  Semiology 2: GTC-->Last GTC 2018, previously required intubation.  His home AED regimen is as follows:   / affect-Daily  mg 10:00 AM, 1500mg (2 tabs 750mg) 22:00 PM  -Daily LEV 1000mg BID  -4 times a week PHT 400mg 10:00 AM  -3 times a week PHT 350mg 22:00     24 HOUR EVENTS:  Admitted to NSCU. Started on phenylephrine gtt for propofol related hypotension.     today morning patient was placed on PS, and propofol was discontinued, and patient was successfully extubated   VITALS/DATA/ORDERS: [x] Reviewed      Gen: awake, alert  HENT: neck soft / supple  Lymph: no LAD noted in neck  Eye: PERRL, sclerae anicteric  CV: normal rate, regular rhythm  Pulm: CTAB, no w/r/r auscultated  Abd: +BS, soft, NT, ND  Skin: warm, dry  Ext: no LE edema  Neuro: awake, alert, perseverating, not answering questions, following simple commands, ABISAI, intact EOM, 5/5 all over slightly weaker on the right   Psych: normal mood    T(C): 36.6 (10-28-20 @ 07:00), Max: 37.6 (10-28-20 @ 01:46)  HR: 77 (10-28-20 @ 10:00) (59 - 97)  BP: 114/66 (10-28-20 @ 10:00) (79/53 - 197/119)  RR: 15 (10-28-20 @ 10:00) (14 - 36)  SpO2: 99% (10-28-20 @ 10:00) (88% - 100%)  10-27-20 @ 07:01  -  10-28-20 @ 07:00  --------------------------------------------------------  IN: 674.7 mL / OUT: 265 mL / NET: 409.7 mL    10-28-20 @ 07:01  -  10-28-20 @ 11:34  --------------------------------------------------------  IN: 775 mL / OUT: 135 mL / NET: 640 mL    chlorhexidine 4% Liquid 1 Application(s) Topical <User Schedule>  enoxaparin Injectable 40 milliGRAM(s) SubCutaneous <User Schedule>  escitalopram 10 milliGRAM(s) Oral daily  finasteride 5 milliGRAM(s) Oral daily  fosphenytoin IVPB 1900 milliGRAM(s) PE IV Intermittent once PRN  lacosamide Solution 200 milliGRAM(s) Oral two times a day  levETIRAcetam  IVPB 1500 milliGRAM(s) IV Intermittent every 12 hours  losartan 50 milliGRAM(s) Oral daily  phenytoin   Suspension 360 milliGRAM(s) Oral <User Schedule>  phenytoin   Suspension 400 milliGRAM(s) Oral <User Schedule>  polyethylene glycol 3350 17 Gram(s) Oral every 12 hours  senna 2 Tablet(s) Oral at bedtime  simvastatin 10 milliGRAM(s) Oral at bedtime  sodium chloride 0.9%. 1000 milliLiter(s) IV Continuous <Continuous>  valproic  acid Syrup 500 milliGRAM(s) Oral daily  valproic  acid Syrup 1500 milliGRAM(s) Oral daily  Mode: A/M 40%                        15.6   11.94 )-----------( 243      ( 27 Oct 2020 23:38 )             45.0     10-27    140  |  101  |  8   ----------------------------<  129<H>  4.3   |  27  |  0.75    Ca    9.0      27 Oct 2020 23:38    TPro  6.9  /  Alb  4.5  /  TBili  0.3  /  DBili  x   /  AST  18  /  ALT  12  /  AlkPhos  70  10-27    LIVER FUNCTIONS - ( 27 Oct 2020 23:38 )  Alb: 4.5 g/dL / Pro: 6.9 g/dL / ALK PHOS: 70 U/L / ALT: 12 U/L / AST: 18 U/L / GGT: x

## 2020-10-29 LAB
ANION GAP SERPL CALC-SCNC: 12 MMOL/L — SIGNIFICANT CHANGE UP (ref 5–17)
BUN SERPL-MCNC: 12 MG/DL — SIGNIFICANT CHANGE UP (ref 7–23)
CALCIUM SERPL-MCNC: 8.5 MG/DL — SIGNIFICANT CHANGE UP (ref 8.4–10.5)
CHLORIDE SERPL-SCNC: 104 MMOL/L — SIGNIFICANT CHANGE UP (ref 96–108)
CO2 SERPL-SCNC: 27 MMOL/L — SIGNIFICANT CHANGE UP (ref 22–31)
CREAT SERPL-MCNC: 0.82 MG/DL — SIGNIFICANT CHANGE UP (ref 0.5–1.3)
CULTURE RESULTS: NO GROWTH — SIGNIFICANT CHANGE UP
GLUCOSE SERPL-MCNC: 87 MG/DL — SIGNIFICANT CHANGE UP (ref 70–99)
HCT VFR BLD CALC: 37.8 % — LOW (ref 39–50)
HGB BLD-MCNC: 13.2 G/DL — SIGNIFICANT CHANGE UP (ref 13–17)
MCHC RBC-ENTMCNC: 30.9 PG — SIGNIFICANT CHANGE UP (ref 27–34)
MCHC RBC-ENTMCNC: 34.9 GM/DL — SIGNIFICANT CHANGE UP (ref 32–36)
MCV RBC AUTO: 88.5 FL — SIGNIFICANT CHANGE UP (ref 80–100)
NRBC # BLD: 0 /100 WBCS — SIGNIFICANT CHANGE UP (ref 0–0)
PLATELET # BLD AUTO: 198 K/UL — SIGNIFICANT CHANGE UP (ref 150–400)
POTASSIUM SERPL-MCNC: 3.8 MMOL/L — SIGNIFICANT CHANGE UP (ref 3.5–5.3)
POTASSIUM SERPL-SCNC: 3.8 MMOL/L — SIGNIFICANT CHANGE UP (ref 3.5–5.3)
RBC # BLD: 4.27 M/UL — SIGNIFICANT CHANGE UP (ref 4.2–5.8)
RBC # FLD: 13.5 % — SIGNIFICANT CHANGE UP (ref 10.3–14.5)
SODIUM SERPL-SCNC: 143 MMOL/L — SIGNIFICANT CHANGE UP (ref 135–145)
SPECIMEN SOURCE: SIGNIFICANT CHANGE UP
T3 SERPL-MCNC: 78 NG/DL — LOW (ref 80–200)
T4 AB SER-ACNC: 4.5 UG/DL — LOW (ref 4.6–12)
TSH SERPL-MCNC: 0.86 UIU/ML — SIGNIFICANT CHANGE UP (ref 0.27–4.2)
WBC # BLD: 8.32 K/UL — SIGNIFICANT CHANGE UP (ref 3.8–10.5)
WBC # FLD AUTO: 8.32 K/UL — SIGNIFICANT CHANGE UP (ref 3.8–10.5)

## 2020-10-29 PROCEDURE — 95720 EEG PHY/QHP EA INCR W/VEEG: CPT

## 2020-10-29 PROCEDURE — 99233 SBSQ HOSP IP/OBS HIGH 50: CPT

## 2020-10-29 RX ORDER — HALOPERIDOL DECANOATE 100 MG/ML
2.5 INJECTION INTRAMUSCULAR ONCE
Refills: 0 | Status: COMPLETED | OUTPATIENT
Start: 2020-10-29 | End: 2020-10-29

## 2020-10-29 RX ORDER — DIVALPROEX SODIUM 500 MG/1
1500 TABLET, DELAYED RELEASE ORAL AT BEDTIME
Refills: 0 | Status: DISCONTINUED | OUTPATIENT
Start: 2020-10-29 | End: 2020-10-30

## 2020-10-29 RX ORDER — RISPERIDONE 4 MG/1
0.5 TABLET ORAL
Refills: 0 | Status: DISCONTINUED | OUTPATIENT
Start: 2020-10-29 | End: 2020-11-01

## 2020-10-29 RX ORDER — RISPERIDONE 4 MG/1
1 TABLET ORAL ONCE
Refills: 0 | Status: COMPLETED | OUTPATIENT
Start: 2020-10-29 | End: 2020-10-29

## 2020-10-29 RX ORDER — ESLICARBAZEPINE ACETATE 800 MG/1
1 TABLET ORAL
Qty: 60 | Refills: 0
Start: 2020-10-29 | End: 2020-11-27

## 2020-10-29 RX ORDER — DIVALPROEX SODIUM 500 MG/1
500 TABLET, DELAYED RELEASE ORAL DAILY
Refills: 0 | Status: DISCONTINUED | OUTPATIENT
Start: 2020-10-29 | End: 2020-11-01

## 2020-10-29 RX ADMIN — ESCITALOPRAM OXALATE 10 MILLIGRAM(S): 10 TABLET, FILM COATED ORAL at 11:57

## 2020-10-29 RX ADMIN — LEVETIRACETAM 400 MILLIGRAM(S): 250 TABLET, FILM COATED ORAL at 06:08

## 2020-10-29 RX ADMIN — SIMVASTATIN 10 MILLIGRAM(S): 20 TABLET, FILM COATED ORAL at 21:32

## 2020-10-29 RX ADMIN — LOSARTAN POTASSIUM 50 MILLIGRAM(S): 100 TABLET, FILM COATED ORAL at 06:08

## 2020-10-29 RX ADMIN — Medication 300 MILLIGRAM(S): at 21:32

## 2020-10-29 RX ADMIN — RISPERIDONE 1 MILLIGRAM(S): 4 TABLET ORAL at 13:18

## 2020-10-29 RX ADMIN — DIVALPROEX SODIUM 1500 MILLIGRAM(S): 500 TABLET, DELAYED RELEASE ORAL at 21:33

## 2020-10-29 RX ADMIN — DIVALPROEX SODIUM 500 MILLIGRAM(S): 500 TABLET, DELAYED RELEASE ORAL at 17:45

## 2020-10-29 RX ADMIN — LACOSAMIDE 200 MILLIGRAM(S): 50 TABLET ORAL at 06:08

## 2020-10-29 RX ADMIN — LACOSAMIDE 200 MILLIGRAM(S): 50 TABLET ORAL at 17:45

## 2020-10-29 RX ADMIN — FINASTERIDE 5 MILLIGRAM(S): 5 TABLET, FILM COATED ORAL at 11:57

## 2020-10-29 RX ADMIN — LEVETIRACETAM 400 MILLIGRAM(S): 250 TABLET, FILM COATED ORAL at 17:45

## 2020-10-29 NOTE — EEG REPORT - NS EEG TEXT BOX
History: 70y R-handed man with a notable PMH of HLD, HTN and L. temporal cavernoma s/p resection (2004) c/b focal epilepsy who presented to [facility] on 10/27/20 as a code stroke for R. hemiparesis and aphasia.  WILMAN 16:05 10/27/20.  On arrival, PT was noted to have R. facial and R. arm twitching, and required propofol and intubation. Per PT's wife, last seizure prior to this was in 2018 when had R. focal seizure with aphasia as well as GTC requiring intubation.  PT follows outpatient with Dr. Urrutia with home regimen of VPA 500mg qAM + 750mg qHS, LEV 1000mg BID, PHT 400mg qHS 4 times a week alternating with PHT 300mg qHS 3 times a week.  PT is reportedly compliant with all meds, had recent increase stress but no significant sleep deprivation, no EtOH or drug abuse, no recent change in medications, head trauma or other changes.    Home Antiepileptic Medication and Device Divalproex 500mg AM, 750mg HS Levetiracetam 1000mg q12 Phenytoin 400mg HS M,W,F,[place]; 300mg HS Tu, Th, Sa  Interpretation:  Starting: Day 1      10/28/2020      Time: 8:00:58 AM Duration: 23h 11m  Daily EEG Visual Analysis FINDINGS:  The background was continuous, spontaneously variable and reactive. During wakefulness, there were fragments of 9-10Hz posterior rhythm, with amplitude to 30 uV.  Low amplitude frontal beta was noted in wakefulness.  Background Slowing: No generalized background slowing was present. Background predominantly consisted of theta, delta, and faster activities.   Focal Slowing:  There is continuous polymorphic delta slowing in the left anterior temporal region.  Sleep Background: Drowsiness was characterized by fragmentation, attenuation, and slowing of the background activity.   Sleep was characterized by the presence of vertex waves, symmetric sleep spindles and K-complexes.  Other Non-Epileptiform Findings: None were present. Breach effect in left anterior temporal region characterized by higher amplitude, sharply contoured waves and fast activities.  Interictal Epileptiform Activity:  There are frequent spikes from the left anterior temporal region (also region of breach artifact)    Events: No events or seizures recorded.  Artifacts: Intermittent myogenic and movement artifacts were noted.  ECG: The heart rate on single channel ECG was predominantly between 60-70 BPM.  AEDs: Divalproex DR 1500 qHS Phenytoin suspension 300 qHS Lacosamide 200 q12 via GT Levetiracetam 1500 IV q12  EEG Summary: Abnormal EEG in the awake, drowsy and asleep states. spikes, focal, left anterior temporal region continuous polymorphic delta, focal, left temporal region  Impression/Clinical Correlate:  There is evidence of a potententially epileptogenic structural abnormality in the left anterior temporal region. There is also evidence of skull defect in the same area.   Pito Person MD Epilepsy Fellow, [facility]     Linus Kennedy MD PhD Director, Epilepsy Division, [facility]

## 2020-10-29 NOTE — SWALLOW BEDSIDE ASSESSMENT ADULT - SLP PERTINENT HISTORY OF CURRENT PROBLEM
70y R-handed M with h/o, HTN, HLD and L. temporal cavernoma s/p resection (2004) c/b focal epilepsy who presented as a code stroke due to aphasia and R. sided hemiparesis later determined to be focal myoclonic seizure with impaired awareness with resultant clinical NCSE due to undetermined etiology. On exam, he was hypertensive, hypoxic requiring 2L O2 via NC, waxing between stupor and comatose state with only intermittent response to noxious stimuli of B/L UE and LE, had myoclonic R. facial, R. arm and R. hand twitching and R. gaze deviation with nystamoid jerks.  Occasionally had left hand myoclonic type jerking as well and B/L upgoing toes.  Noted to also have urinary incontinence.

## 2020-10-29 NOTE — DIETITIAN INITIAL EVALUATION ADULT. - PERTINENT LABORATORY DATA
10-29 Na 143 mmol/L Glu 87 mg/dL K+ 3.8 mmol/L Cr  0.82 mg/dL BUN 12 mg/dL Phos n/a   Alb n/a   PAB n/a   Hgb 13.2 g/dL Hct 37.8 %<L>

## 2020-10-29 NOTE — OCCUPATIONAL THERAPY INITIAL EVALUATION ADULT - BALANCE TRAINING, PT EVAL
GOAL: Pt will demonstrate improved static/dynamic balance by 1.2 grade throughout in order to increase safety and independence in ADLs within 4 weeks

## 2020-10-29 NOTE — OCCUPATIONAL THERAPY INITIAL EVALUATION ADULT - PERTINENT HX OF CURRENT PROBLEM, REHAB EVAL
70y R-handed M with h/o, HTN, HLD and L. temporal cavernoma s/p resection (2004) c/b focal epilepsy who presented as a code stroke due to aphasia and R. sided hemiparesis later determined to be focal myoclonic seizure with impaired awareness with resultant clinical NCSE due to undetermined etiology.

## 2020-10-29 NOTE — OCCUPATIONAL THERAPY INITIAL EVALUATION ADULT - ADDITIONAL COMMENTS
CT Brain Stroke Protocol: No acute intracranial hemorrhage, mass effect, or other acute intracranial pathology. Prominent ventricles that are stable in size. CT Angio neck/brain/perfusion: (-)

## 2020-10-29 NOTE — OCCUPATIONAL THERAPY INITIAL EVALUATION ADULT - ASR WT BEARING STATUS EVAL
On exam, he was hypertensive, hypoxic requiring 2L O2 via NC, waxing between stupor and comatose state with only intermittent response to noxious stimuli of B/L UE and LE, had myoclonic R. facial, R. arm and R. hand twitching and R. gaze deviation with nystamoid jerks.  Occasionally had left hand myoclonic type jerking as well and B/L upgoing toes.  Noted to also have urinary incontinence. no weight-bearing restrictions/On exam, he was hypertensive, hypoxic requiring 2L O2 via NC, waxing between stupor and comatose state with only intermittent response to noxious stimuli of B/L UE and LE, had myoclonic R. facial, R. arm and R. hand twitching and R. gaze deviation with nystamoid jerks.  Occasionally had left hand myoclonic type jerking as well and B/L upgoing toes.  Noted to also have urinary incontinence.

## 2020-10-29 NOTE — PROGRESS NOTE ADULT - ASSESSMENT
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 sided hemiparesis later determined to be focal myoclonic seizure with impaired awareness with resultant clinical NCSE due to undetermined etiology. On exam, he was hypertensive, hypoxic requiring 2L O2 via NC, waxing between stupor and comatose state with only intermittent response to noxious stimuli of B/L UE and LE, had myoclonic R. facial, R. arm and R. hand twitching and R. gaze deviation with nystamoid jerks.  Occasionally had left hand myoclonic type jerking as well and B/L upgoing toes.  Noted to also have urinary incontinence.      Semiology 1: R. sided myoclonic jerks with aphasia and R. sided hemiparesis with impaired awareness +/- urinary incontinence lasting several minutes with 10-30 minutes post-ictal confusion  Semiology 2: GTC-->Last GTC 2018, previously required intubation.    LKN: 16:05  NIHSS: 21  GCS: 4  Baseline MRS: 0  Not a tPA candidate due to being outside LKN time and due to stroke not suspected  Not a thrombectomy candidate due to no LVO  CTH: Old L. temporal encephalomalacia, no new large strokes, masses or hemorrhage  CTA H&N: No LVO  CTP: 0 core, penumbra recorded as 24 mL (But notably in B/L fronal and occipital and cerebellar lobes --likely scan error), mismatch infinite.     IMPRESSION: Focal myoclonic seizure with impaired awareness with subsequent NCSE likely from L fronto-temporal etiology due to known focal epilepsy with unclear provoking trigger vs. breakthrough seizure. Last seizure 2 years prior.     PLAN:  [] vEEG  [] Hold off on MRI Brain for now  [] F/u repeat PHT level, LEV level. Last PHT level subtherapeutic at 9.7 (10/28), given LD 200mg IV fos-PHT. VPA level therapeutic at 70 with normal albumin.  []Will continue home dose meds IV form.  ( IV qAM, 750 IV qHS | LEV 1000mg IV BID).   [] F/u CPK, lactate  [] F/u Utox, EtOH level neg    PULM: S/p intubation and PS for status epilepticus. SPO2 % on RA.  CARDS: s/p phenylephrine in [facility]. HR 70-91, -170/66-81. Consider resuming losartan 50 mg daily if BP elevated. Troponin T 14. Cont simvastatin.  Renal: NS 75 ml/hr. Monitor urine output.  GI/FEN: Dysphagia screen neg, d/c NGT, on mechanical soft diet. Bowel regimen.   ENDO: Goal euglycemia (-180)  HEME/ONC: SCDs + Lovenox for VTE prophylaxis  ID: CXR neg, UA neg infection, UCx NGTD, BCx x 2 NGTD, COVID-19 PCR + IgG ab neg  Social: Ongoing discussion with pt's health care proxy, Jeannie (wife) [phone].    Assessment and plan discussed with Dr. Washington and epileptologist, Dr. Hoover.    Jeannie Espinal, MS4     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 sided hemiparesis later determined to be focal myoclonic seizure with impaired awareness with resultant clinical NCSE likely from L fronto-temporal etiology due to known focal epilepsy with unclear provoking trigger vs. breakthrough seizure. Last seizure 2 years prior. Transferred from N[facility]U to EMU this AM. Currently stable.      Plan:  [] Continue vEEG  [] Hold off on MRI Brain for now  [] F/u repeat PHT level, LEV level. Last PHT level subtherapeutic at 9.7 (10/28), given LD 200mg IV fos-PHT. VPA level therapeutic at 70 with normal albumin.  []Will continue home dose meds IV form.  ( IV qAM, 750 IV qHS | LEV 1000mg IV BID).   [] F/u CPK, lactate  [] F/u Utox, EtOH level neg    PULM: S/p intubation and PS for status epilepticus. SPO2 % on RA.  CARDS: s/p phenylephrine in [facility]. HR 70-91, -170/66-81. Consider resuming losartan 50 mg daily if BP elevated. Troponin T 14. Cont simvastatin.  Renal: NS 75 ml/hr. Monitor urine output.  GI/FEN: Dysphagia screen neg, d/c NGT, on mechanical soft diet. Bowel regimen.   ENDO: Goal euglycemia (-180)  HEME/ONC: SCDs + Lovenox for VTE prophylaxis  ID: CXR neg, UA neg infection, UCx NGTD, BCx x 2 NGTD, COVID-19 PCR + IgG ab neg  Social: Ongoing discussion with pt's health care proxy, Jeannie (wife) [phone].    Assessment and plan discussed with Dr. Washington and epileptologist, Dr. Hoover.    Jeannie Espinal, MS4

## 2020-10-29 NOTE — SWALLOW BEDSIDE ASSESSMENT ADULT - ORAL PHASE
Within functional limits +Occasional speaking with PO in oral cavity, able to be redirected and reduced speaking given verbal cues./Within functional limits

## 2020-10-29 NOTE — OCCUPATIONAL THERAPY INITIAL EVALUATION ADULT - COGNITIVE, VISUAL PERCEPTUAL, OT EVAL
GOAL: Pt will follow 2 step commands 50% of the time during ADL tasks in 4 weeks; GOAL: Pt will be able to identify objects 4/5 trials correctly in 4 weeks

## 2020-10-29 NOTE — OCCUPATIONAL THERAPY INITIAL EVALUATION ADULT - LIVES WITH, PROFILE
Pt lives with spouse in private house. As per wife, full flight of [place]. Wife states that she helped pt in the shower. Pt was Independent with dressing and toileting. Pt does not own any DME as wife states that pt refuses any AD/DME./spouse

## 2020-10-29 NOTE — DIETITIAN INITIAL EVALUATION ADULT. - OTHER INFO
Pt declined interview at this time stating he wants to go home. Pt denies any known food allergies or intolerances.   Pt s/p swallow eval and now upgraded to dysphagia 2 mechanical soft with thin liquids. Pt was previously receiving enteral nutrition. Pt has yet to have PO meal; will continue to monitor.    Education not appropriate at this time.

## 2020-10-29 NOTE — DIETITIAN INITIAL EVALUATION ADULT. - PHYSCIAL ASSESSMENT
well nourished Skin: no pressure injury documented   Nutrition-focused physical exam deferred at this time as pt was agitated

## 2020-10-29 NOTE — OCCUPATIONAL THERAPY INITIAL EVALUATION ADULT - NS ASR FOLLOW COMMAND OT EVAL
Pt noted to perseverate on tasks throughout eval/50% of the time/able to follow single-step instructions

## 2020-10-29 NOTE — DIETITIAN INITIAL EVALUATION ADULT. - ADD RECOMMEND
Encourage PO intake, obtain food preferences, provide feeding assistance as needed. RD to add Mighty Shakes (200kcal, 6g protein/serving) to meals to promote adequate energy intake. Monitor PO intake, diet tolerance, weight trends, labs, GI function, and skin integrity.

## 2020-10-29 NOTE — DIETITIAN INITIAL EVALUATION ADULT. - PERTINENT MEDS FT
MEDICATIONS  (STANDING):  diVALproex  milliGRAM(s) Oral daily  diVALproex DR 1500 milliGRAM(s) Oral at bedtime  enoxaparin Injectable 40 milliGRAM(s) SubCutaneous <User Schedule>  escitalopram 10 milliGRAM(s) Oral daily  finasteride 5 milliGRAM(s) Oral daily  lacosamide Solution 200 milliGRAM(s) Oral two times a day  levETIRAcetam  IVPB 1500 milliGRAM(s) IV Intermittent every 12 hours  losartan 50 milliGRAM(s) Oral daily  phenytoin   Suspension 400 milliGRAM(s) Oral <User Schedule>  phenytoin   Suspension 360 milliGRAM(s) Oral <User Schedule>  polyethylene glycol 3350 17 Gram(s) Oral every 12 hours  senna 2 Tablet(s) Oral at bedtime  simvastatin 10 milliGRAM(s) Oral at bedtime  sodium chloride 0.9%. 1000 milliLiter(s) (75 mL/Hr) IV Continuous <Continuous>    MEDICATIONS  (PRN):  fosphenytoin IVPB 1900 milliGRAM(s) PE IV Intermittent once PRN Seizure refractory to Keppra loading dose

## 2020-10-29 NOTE — PROGRESS NOTE ADULT - SUBJECTIVE AND OBJECTIVE BOX
Neurology  Progress Note  10-29-20    HOSPITAL COURSE: 70y R-handed man with a notable PMH of HLD, HTN and L. temporal cavernoma s/p resection (2004) c/b focal epilepsy who presented to [facility] on 10/27/20 as a code stroke for R. hemiparesis and aphasia later determined to be focal myoclonic seizure with impaired awareness with resultant clinical NCSE due to undetermined etiology. S/P IV Ativan 2mg x 2, Keppra 2g and 200mg IV Vimpat without return to baseline or resolution of myoclonic twitching.  Pt subsequently intubated for refractory NCSE, started on propofol gtt which improved clinical events.  Extubated 10/28 AM. S/p phenylephrine for propofol related hypotension. S/p 20mg IVP etomidate. S/p LD of 200mg IV fos-PHT for subtherapeutic PHT (9.7).      OVERNIGHT EVENTS: Transferred from NSCU to EMU.     HPI: Pt was seen and examined at bedside. Denies seizures, pain. Pt insisted on discharge today. The importance of continued hospitalization for AED optimization and observation as well as the risks of premature discharge were explained. Pt continued to insist on discharge. This discussion will be resumed when patient's wife visits this afternoon.    REVIEW OF SYSTEMS:  As states in HPI.    Medications:  diVALproex  milliGRAM(s) Oral daily  diVALproex DR 1500 milliGRAM(s) Oral at bedtime  enoxaparin Injectable 40 milliGRAM(s) SubCutaneous <User Schedule>  escitalopram 10 milliGRAM(s) Oral daily  etomidate Injectable 20 milliGRAM(s) IV Push once  etomidate Injectable 20 milliGRAM(s) IV Push once  fentaNYL    Injectable 100 MICROGram(s) IV Push once  finasteride 5 milliGRAM(s) Oral daily  fosphenytoin IVPB 1900 milliGRAM(s) PE IV Intermittent once PRN  fosphenytoin IVPB 200 milliGRAM(s) PE IV Intermittent once  lacosamide Injectable 200 milliGRAM(s) IV Push once  lacosamide Solution 200 milliGRAM(s) Oral two times a day  levETIRAcetam  IVPB 2000 milliGRAM(s) IV Intermittent once  levETIRAcetam  IVPB 1500 milliGRAM(s) IV Intermittent every 12 hours  LORazepam   Injectable 2 milliGRAM(s) IV Push Once  LORazepam   Injectable 2 milliGRAM(s) IV Push Once  losartan 50 milliGRAM(s) Oral daily  phenytoin   Suspension 400 milliGRAM(s) Oral <User Schedule>  phenytoin   Suspension 360 milliGRAM(s) Oral <User Schedule>  polyethylene glycol 3350 17 Gram(s) Oral every 12 hours  propofol Injectable 50 milliGRAM(s) IV Push once  rocuronium Injectable 100 milliGRAM(s) IV Push once  senna 2 Tablet(s) Oral at bedtime  simvastatin 10 milliGRAM(s) Oral at bedtime  sodium chloride 0.9% Bolus 500 milliLiter(s) IV Bolus once  sodium chloride 0.9%. 1000 milliLiter(s) IV Continuous <Continuous>    Vitals:  T(C): 36.7 (10-29-20 @ 04:24), Max: 37.5 (10-28-20 @ 15:00)  HR: 91 (10-29-20 @ 12:08) (66 - 92)  BP: 170/81 (10-29-20 @ 12:08) (110/68 - 170/81)  RR: 18 (10-29-20 @ 04:24) (13 - 20)  SpO2: 94% (10-29-20 @ 12:08) (93% - 100%)    Labs:                        13.2   8.32  )-----------( 198      ( 29 Oct 2020 07:27 )             37.8     10-29    143  |  104  |  12  ----------------------------<  87  3.8   |  27  |  0.82    Ca    8.5      29 Oct 2020 07:27    TPro  6.9  /  Alb  4.5  /  TBili  0.3  /  DBili  x   /  AST  18  /  ALT  12  /  AlkPhos  70  10-27    CAPILLARY BLOOD GLUCOSE      POCT Blood Glucose.: 120 mg/dL (27 Oct 2020 23:27)    LIVER FUNCTIONS - ( 27 Oct 2020 23:38 )  Alb: 4.5 g/dL / Pro: 6.9 g/dL / ALK PHOS: 70 U/L / ALT: 12 U/L / AST: 18 U/L / GGT: x             Culture - Blood (collected 28 Oct 2020 07:36)  Source: .Blood Blood-Peripheral  Preliminary Report (29 Oct 2020 08:01):    No growth to date.    Culture - Blood (collected 28 Oct 2020 04:45)  Source: .Blood Blood-Peripheral  Preliminary Report (29 Oct 2020 05:01):    No growth to date.    Culture - Urine (collected 28 Oct 2020 04:27)  Source: .Urine Clean Catch (Midstream)  Final Report (29 Oct 2020 07:14):    No growth      PT/INR - ( 27 Oct 2020 23:38 )   PT: 12.3 sec;   INR: 1.03 ratio         PTT - ( 27 Oct 2020 23:38 )  PTT:34.9 sec      PHYSICAL EXAMINATION:  General: Well-developed, well nourished, in no acute distress.  Eyes: Conjunctiva and sclera clear.  Neurologic:  - Mental Status:  Alert, awake, oriented to person, place, and time; Speech is fluent with intact naming, repetition, and comprehension.  - Cranial Nerves II-XII: grossly intact.  - Motor:  Strength is 5/5 throughout.  There is no prontator drift.  Normal muscle bulk and tone throughout.  - Reflexes:  2+ and symmetric at the biceps, triceps, brachioradialis, knees, and ankles.  Plantar responses flexor.  - Sensory:  Intact throughout to vibration, and joint-position, light touch, pin prick.  - Coordination:  Finger-nose-finger and heel-knee-shin intact without dysmetria.  Rapid alternating hand movements intact.  - Gait:  unable to assess at this time.

## 2020-10-29 NOTE — SWALLOW BEDSIDE ASSESSMENT ADULT - SLP GENERAL OBSERVATIONS
Pt encountered asleep in bed, NPO/NGT, currently on VEEG monitoring. Pt easily aroused given minimal verbal cues. Pt AA&Ox1 (person only) with aphasia. Pt able to follow simple 1-step directives intermittently, however, difficulty answering simple yes/no biographical information due to aphasia. Pt perseverated on "[place]" (where he lives) and "Please find my wife." Due to initial non-compliance with session, this SLP contacted wife, Jeannie [phone]. Wife stated that her  tolerates regular solids and thin liquids at home without difficulty. Once this SLP provided arrival time for wife later today, pt complied with session. Pt benefitted from verbal cues to remain quiet during mastication.

## 2020-10-29 NOTE — SWALLOW BEDSIDE ASSESSMENT ADULT - COMMENTS
Per Neuro:  Focal myoclonic seizure with impaired awareness with subsequent NCSE likely from L fronto-temporal etiology due to known focal epilepsy with unclear provoking trigger vs. breakthrough seizure. Last seizure 2 years prior

## 2020-10-29 NOTE — SWALLOW BEDSIDE ASSESSMENT ADULT - ADDITIONAL RECOMMENDATIONS
Monitor for s/s aspiration/laryngeal penetration. If noted:  D/C p.o. intake, provide non-oral nutrition/hydration/meds, and contact this service @ [phone]   Maintain good oral hygiene

## 2020-10-29 NOTE — OCCUPATIONAL THERAPY INITIAL EVALUATION ADULT - ANTICIPATED DISCHARGE DISPOSITION, OT EVAL
Acute TBI. If pt were to be d/c home, Home OT recommended for home safety evaluation, DME training, fall prevention, cognition, ADLs, balance, strength, ROM and endurance. Recommend 3-in-1 commode and RW. Supervision/Assist for ALL ADLs and functional mobility as needed./rehabilitation facility

## 2020-10-29 NOTE — OCCUPATIONAL THERAPY INITIAL EVALUATION ADULT - DIAGNOSIS, OT EVAL
Pt presents with decreased attention, cognition, motor planning, balance, endurance, and ROM impacting functional mobility and the ability to participate in ADLs

## 2020-10-29 NOTE — SWALLOW BEDSIDE ASSESSMENT ADULT - SWALLOW EVAL: DIAGNOSIS
70 y.o. malewho presented as a code stroke due to aphasia and R. sided hemiparesis later determined to be focal myoclonic seizure with impaired awareness with resultant clinical NCSE due to undetermined etiology. On exam, he was hypertensive, hypoxic requiring 2L O2 via NC, waxing between stupor and comatose state with only intermittent response to noxious stimuli. Pt intubated in the ED 10/28 at 05:13 and extubated 10/28 at 08:22. 70 y.o. malewho presented as a code stroke due to aphasia and R. sided hemiparesis later determined to be focal myoclonic seizure with impaired awareness with resultant clinical NCSE due to undetermined etiology. On exam, he was hypertensive, hypoxic requiring 2L O2 via NC, waxing between stupor and comatose state with only intermittent response to noxious stimuli. Pt intubated in the ED 10/28 at 05:13 and extubated 10/28 at 08:22. Pt presents with a grossly functional oropharyngeal swallow mechanism, however, noted to speak occasionally with PO in oral cavity due to cognitive deficits/slight agitation. Pt tolerated soft solids, purees and thin liquids with adequate bolus prep and transport, timely swallow initiation and adequate laryngeal elevation upon palpation. Pt occasionally noted to speak with PO in oral cavity due to cognitive deficits as well as frustration re: location of wife. Pt able to be redirected to cease speaking with PO in oral cavity given verbal cues.

## 2020-10-30 ENCOUNTER — TRANSCRIPTION ENCOUNTER (OUTPATIENT)
Age: 70
End: 2020-10-30

## 2020-10-30 LAB
ANION GAP SERPL CALC-SCNC: 11 MMOL/L — SIGNIFICANT CHANGE UP (ref 5–17)
BUN SERPL-MCNC: 10 MG/DL — SIGNIFICANT CHANGE UP (ref 7–23)
CALCIUM SERPL-MCNC: 8.7 MG/DL — SIGNIFICANT CHANGE UP (ref 8.4–10.5)
CHLORIDE SERPL-SCNC: 104 MMOL/L — SIGNIFICANT CHANGE UP (ref 96–108)
CO2 SERPL-SCNC: 28 MMOL/L — SIGNIFICANT CHANGE UP (ref 22–31)
CREAT SERPL-MCNC: 0.8 MG/DL — SIGNIFICANT CHANGE UP (ref 0.5–1.3)
GLUCOSE SERPL-MCNC: 89 MG/DL — SIGNIFICANT CHANGE UP (ref 70–99)
HCT VFR BLD CALC: 38.6 % — LOW (ref 39–50)
HGB BLD-MCNC: 13.5 G/DL — SIGNIFICANT CHANGE UP (ref 13–17)
MCHC RBC-ENTMCNC: 31 PG — SIGNIFICANT CHANGE UP (ref 27–34)
MCHC RBC-ENTMCNC: 35 GM/DL — SIGNIFICANT CHANGE UP (ref 32–36)
MCV RBC AUTO: 88.5 FL — SIGNIFICANT CHANGE UP (ref 80–100)
NRBC # BLD: 0 /100 WBCS — SIGNIFICANT CHANGE UP (ref 0–0)
PHENYTOIN FREE SERPL-MCNC: 9.1 UG/ML — LOW (ref 10–20)
PLATELET # BLD AUTO: 170 K/UL — SIGNIFICANT CHANGE UP (ref 150–400)
POTASSIUM SERPL-MCNC: 3.6 MMOL/L — SIGNIFICANT CHANGE UP (ref 3.5–5.3)
POTASSIUM SERPL-SCNC: 3.6 MMOL/L — SIGNIFICANT CHANGE UP (ref 3.5–5.3)
RBC # BLD: 4.36 M/UL — SIGNIFICANT CHANGE UP (ref 4.2–5.8)
RBC # FLD: 13.2 % — SIGNIFICANT CHANGE UP (ref 10.3–14.5)
SODIUM SERPL-SCNC: 143 MMOL/L — SIGNIFICANT CHANGE UP (ref 135–145)
VALPROATE SERPL-MCNC: 51 UG/ML — SIGNIFICANT CHANGE UP (ref 50–100)
WBC # BLD: 7.11 K/UL — SIGNIFICANT CHANGE UP (ref 3.8–10.5)
WBC # FLD AUTO: 7.11 K/UL — SIGNIFICANT CHANGE UP (ref 3.8–10.5)

## 2020-10-30 PROCEDURE — 99222 1ST HOSP IP/OBS MODERATE 55: CPT | Mod: GC

## 2020-10-30 PROCEDURE — 99233 SBSQ HOSP IP/OBS HIGH 50: CPT

## 2020-10-30 PROCEDURE — 95720 EEG PHY/QHP EA INCR W/VEEG: CPT

## 2020-10-30 RX ORDER — DIVALPROEX SODIUM 500 MG/1
1000 TABLET, DELAYED RELEASE ORAL AT BEDTIME
Refills: 0 | Status: DISCONTINUED | OUTPATIENT
Start: 2020-10-30 | End: 2020-11-01

## 2020-10-30 RX ORDER — LACOSAMIDE 50 MG/1
100 TABLET ORAL
Refills: 0 | Status: DISCONTINUED | OUTPATIENT
Start: 2020-10-30 | End: 2020-10-31

## 2020-10-30 RX ADMIN — DIVALPROEX SODIUM 1000 MILLIGRAM(S): 500 TABLET, DELAYED RELEASE ORAL at 21:31

## 2020-10-30 RX ADMIN — Medication 1 MILLIGRAM(S): at 11:20

## 2020-10-30 RX ADMIN — LACOSAMIDE 200 MILLIGRAM(S): 50 TABLET ORAL at 06:15

## 2020-10-30 RX ADMIN — FINASTERIDE 5 MILLIGRAM(S): 5 TABLET, FILM COATED ORAL at 11:20

## 2020-10-30 RX ADMIN — DIVALPROEX SODIUM 500 MILLIGRAM(S): 500 TABLET, DELAYED RELEASE ORAL at 11:20

## 2020-10-30 RX ADMIN — ENOXAPARIN SODIUM 40 MILLIGRAM(S): 100 INJECTION SUBCUTANEOUS at 17:26

## 2020-10-30 RX ADMIN — LEVETIRACETAM 400 MILLIGRAM(S): 250 TABLET, FILM COATED ORAL at 06:15

## 2020-10-30 RX ADMIN — LACOSAMIDE 100 MILLIGRAM(S): 50 TABLET ORAL at 17:26

## 2020-10-30 RX ADMIN — SIMVASTATIN 10 MILLIGRAM(S): 20 TABLET, FILM COATED ORAL at 21:31

## 2020-10-30 RX ADMIN — LEVETIRACETAM 400 MILLIGRAM(S): 250 TABLET, FILM COATED ORAL at 17:27

## 2020-10-30 RX ADMIN — RISPERIDONE 0.5 MILLIGRAM(S): 4 TABLET ORAL at 17:26

## 2020-10-30 RX ADMIN — Medication 300 MILLIGRAM(S): at 21:31

## 2020-10-30 RX ADMIN — RISPERIDONE 0.5 MILLIGRAM(S): 4 TABLET ORAL at 06:17

## 2020-10-30 RX ADMIN — RISPERIDONE 0.5 MILLIGRAM(S): 4 TABLET ORAL at 06:20

## 2020-10-30 RX ADMIN — ESCITALOPRAM OXALATE 10 MILLIGRAM(S): 10 TABLET, FILM COATED ORAL at 11:20

## 2020-10-30 NOTE — DISCHARGE NOTE PROVIDER - NSDCCPCAREPLAN_GEN_ALL_CORE_FT
PRINCIPAL DISCHARGE DIAGNOSIS  Diagnosis: Status epilepticus  Assessment and Plan of Treatment: Follow up with your Primary Care Physician within the next 2-3 days   Follow up with your Neurologist within the next 2 weeks  Bring a copy of your test results with you to your appointment  Continue your current medication regimen  Contact your Neurologist, Primary Care Provider or report to the Emergency Room if you experience new or worsening symptoms

## 2020-10-30 NOTE — DISCHARGE NOTE PROVIDER - NSDCMRMEDTOKEN_GEN_ALL_CORE_FT
Aptiom 600 mg oral tablet: 1 tab(s) orally 2 times a day   divalproex sodium 250 mg oral delayed release tablet: 3 tab(s) orally once a day (at bedtime)  divalproex sodium 500 mg oral delayed release tablet: 1 tab(s) orally once a day 10:00 AM  finasteride 5 mg oral tablet: 1 tab(s) orally once a day  levETIRAcetam 1000 mg oral tablet: 1 tab(s) orally 2 times a day  Lexapro 10 mg oral tablet: 1 tab(s) orally once a day  LORazepam 1 mg oral tablet: 1 tab(s) orally once a day, As Needed for seizure  losartan 50 mg oral tablet: 1 tab(s) orally once a day  simvastatin 10 mg oral tablet: 1 tab(s) orally once a day (at bedtime)   Aptiom 600 mg oral tablet: 1 tab(s) orally 2 times a day   divalproex sodium 500 mg oral delayed release tablet: 1 tab(s) orally once a day  divalproex sodium 500 mg oral delayed release tablet: 2 tab(s) orally once a day (at bedtime)  escitalopram 10 mg oral tablet: 1 tab(s) orally once a day  finasteride 5 mg oral tablet: 1 tab(s) orally once a day  levETIRAcetam 1000 mg oral tablet: 1.5 tab(s) orally 2 times a day  losartan 50 mg oral tablet: 1 tab(s) orally once a day  phenytoin 300 mg oral capsule, extended release: 1 cap(s) orally once a day (at bedtime)  simvastatin 10 mg oral tablet: 1 tab(s) orally once a day (at bedtime)

## 2020-10-30 NOTE — CONSULT NOTE ADULT - ATTENDING COMMENTS
Seen and examined with resident. Agree with note.   Recommend acute rehab- however, patient and his wife adamantly prefer home with home therapy. Discussed that he will need 24h supervision
continue medication the same  will consider adjustment after discussing with primary neurologist

## 2020-10-30 NOTE — PROGRESS NOTE ADULT - ASSESSMENT
70y R-handed M with h/o, HTN, HLD and L. temporal cavernoma s/p resection (2004) c/b focal epilepsy who presented as a code stroke due to aphasia and R. sided hemiparesis later determined to be focal myoclonic seizure with impaired awareness with resultant clinical NCSE likely from L fronto-temporal etiology due to known focal epilepsy with unclear provoking trigger vs. breakthrough seizure. Last seizure 2 years prior. Transferred from NSCU to EMU. Currently stable.      Plan:  [] Continue vEEG  [] Hold off on MRI Brain for now  [] F/u repeat PHT level, LEV level. Last PHT level subtherapeutic at 9.7 (10/28), given LD 200mg IV fos-PHT. VPA level therapeutic at 70 with normal albumin.  [] Will continue home dose meds IV form.  ( IV qAM, 750 IV qHS | LEV 1000mg IV BID).   [] F/u CPK, lactate  [] F/u Utox, EtOH level neg    PULM: S/p intubation and PS for status epilepticus. SPO2 % on RA.  CARDS: s/p phenylephrine in [facility]. HR 70-91, -170/66-81. Consider resuming losartan 50 mg daily if BP elevated. Troponin T 14. Cont simvastatin.  Renal: NS 75 ml/hr. Monitor urine output.  GI/FEN: Dysphagia screen neg, d/c NGT, on mechanical soft diet. Bowel regimen.   ENDO: Goal euglycemia (-180)  HEME/ONC: SCDs + Lovenox for VTE prophylaxis  ID: CXR neg, UA neg infection, UCx NGTD, BCx x 2 NGTD, COVID-19 PCR + IgG ab neg  Social: Ongoing discussion with pt's health care proxy, Jeannie (wife) [phone].    Assessment and plan discussed with epileptologist, Dr. Hoover.   70y R-handed M with h/o, HTN, HLD and L. temporal cavernoma s/p resection (2004) c/b focal epilepsy who presented as a code stroke due to aphasia and R. sided hemiparesis later determined to be focal myoclonic seizure with impaired awareness with resultant clinical NCSE likely from L fronto-temporal etiology due to known focal epilepsy with unclear provoking trigger vs. breakthrough seizure. Last seizure 2 years prior. Transferred from NSCU to EMU. Currently stable.      Plan:  [] Continue vEEG  [] Hold off on MRI Brain for now  [] Vimpat decreased to 100mg bid for two doses. To be stopped after morning dose in the AM of 10/31.  [] Depakote 500mg AM and 1500 decreased to 1000mg for PM dose  [] Phenytoin 300mg qhs   [] VPA (51) and Phenytoin (9.1) F/u levels in AM   [] F/u repeat PHT level, LEV level. Last PHT level subtherapeutic at 9.7 (10/28), given LD 200mg IV fos-PHT. VPA level therapeutic at 70 with normal albumin.  [] F/u CPK, lactate  [] F/u Utox, EtOH level neg    PULM: S/p intubation and PS for status epilepticus. SPO2 % on RA.  CARDS: s/p phenylephrine in [facility]U. HR 70-91, -170/66-81. Consider resuming losartan 50 mg daily if BP elevated. Troponin T 14. Cont simvastatin.  Renal: NS 75 ml/hr. Monitor urine output.  GI/FEN: Dysphagia screen neg, d/c NGT, on mechanical soft diet. Bowel regimen.   ENDO: Goal euglycemia (-180)  HEME/ONC: SCDs + Lovenox for VTE prophylaxis  ID: CXR neg, UA neg infection, UCx NGTD, BCx x 2 NGTD, COVID-19 PCR + IgG ab neg  Social: Ongoing discussion with pt's health care proxy, Jeannie (wife) [phone].    Assessment and plan discussed with epileptologist, Dr. Hoover.

## 2020-10-30 NOTE — EEG REPORT - NS EEG HISTORY CONTINUOUS VIDEO EEG SUG
management of status epilepticus/medical management of seizure exacerbation
medical management of seizure exacerbation/management of status epilepticus

## 2020-10-30 NOTE — PROGRESS NOTE ADULT - SUBJECTIVE AND OBJECTIVE BOX
Neurology  Progress Note  10-30-20    HOSPITAL COURSE: 70y R-handed man with a notable PMH of HLD, HTN and L. temporal cavernoma s/p resection (2004) c/b focal epilepsy who presented to [facility] on 10/27/20 as a code stroke for R. hemiparesis and aphasia later determined to be focal myoclonic seizure with impaired awareness with resultant clinical NCSE due to undetermined etiology. S/P IV Ativan 2mg x 2, Keppra 2g and 200mg IV Vimpat without return to baseline or resolution of myoclonic twitching.  Pt subsequently intubated for refractory NCSE, started on propofol gtt which improved clinical events.  Extubated 10/28 AM. S/p phenylephrine for propofol related hypotension. S/p 20mg IVP etomidate. S/p LD of 200mg IV fos-PHT for subtherapeutic PHT (9.7).      OVERNIGHT EVENTS: No acute events.    MEDICATIONS  (STANDING):  diVALproex  milliGRAM(s) Oral daily  diVALproex DR 1500 milliGRAM(s) Oral at bedtime  enoxaparin Injectable 40 milliGRAM(s) SubCutaneous <User Schedule>  escitalopram 10 milliGRAM(s) Oral daily  finasteride 5 milliGRAM(s) Oral daily  lacosamide Solution 200 milliGRAM(s) Oral two times a day  levETIRAcetam  IVPB 1500 milliGRAM(s) IV Intermittent every 12 hours  losartan 50 milliGRAM(s) Oral daily  phenytoin   Suspension 300 milliGRAM(s) Oral at bedtime  polyethylene glycol 3350 17 Gram(s) Oral every 12 hours  risperiDONE   Tablet 0.5 milliGRAM(s) Oral two times a day  senna 2 Tablet(s) Oral at bedtime  simvastatin 10 milliGRAM(s) Oral at bedtime  sodium chloride 0.9%. 1000 milliLiter(s) (75 mL/Hr) IV Continuous <Continuous>    MEDICATIONS  (PRN):  fosphenytoin IVPB 1900 milliGRAM(s) PE IV Intermittent once PRN Seizure refractory to Keppra loading dose      Vitals:  Vital Signs Last 24 Hrs  T(C): 37 (30 Oct 2020 08:00), Max: 37.1 (29 Oct 2020 12:36)  T(F): 98.6 (30 Oct 2020 08:00), Max: 98.8 (29 Oct 2020 15:58)  HR: 75 (30 Oct 2020 08:00) (73 - 91)  BP: 157/82 (30 Oct 2020 08:00) (133/67 - 170/81)  RR: 18 (30 Oct 2020 08:00) (18 - 18)  SpO2: 97% (30 Oct 2020 08:00) (94% - 100%)    Labs:                        13.5   7.11  )-----------( 170      ( 30 Oct 2020 05:41 )             38.6     10-30    143  |  104  |  10  ----------------------------<  89  3.6   |  28  |  0.80    Ca    8.7      30 Oct 2020 05:41      LIVER FUNCTIONS - ( 27 Oct 2020 23:38 )  Alb: 4.5 g/dL / Pro: 6.9 g/dL / ALK PHOS: 70 U/L / ALT: 12 U/L / AST: 18 U/L / GGT: x             Culture - Blood (collected 28 Oct 2020 07:36)  Source: .Blood Blood-Peripheral  Preliminary Report (29 Oct 2020 08:01):    No growth to date.    Culture - Blood (collected 28 Oct 2020 04:45)  Source: .Blood Blood-Peripheral  Preliminary Report (29 Oct 2020 05:01):    No growth to date.    Culture - Urine (collected 28 Oct 2020 04:27)  Source: .Urine Clean Catch (Midstream)  Final Report (29 Oct 2020 07:14):    No growth      PT/INR - ( 27 Oct 2020 23:38 )   PT: 12.3 sec;   INR: 1.03 ratio         PTT - ( 27 Oct 2020 23:38 )  PTT:34.9 sec      PHYSICAL EXAMINATION:  General: Well-developed, well nourished, in no acute distress.  Mental Status:  Alert, awake, oriented to person, place, and time; Speech is fluent with intact naming, repetition, and comprehension.  Cranial Nerves II-XII: grossly intact.  Motor:  Strength is 5/5 throughout.  There is no prontator drift.  Normal muscle bulk and tone throughout.  Reflexes:  2+ and symmetric at the biceps, triceps, brachioradialis, knees, and ankles.  Plantar responses flexor.  Sensory:  Intact throughout to vibration, and joint-position, light touch, pin prick.  Coordination:  Finger-nose-finger and heel-knee-shin intact without dysmetria.  Rapid alternating hand movements intact.  Gait:  unable to assess at this time.     Neurology  Progress Note  10-30-20    HOSPITAL COURSE: 70y R-handed man with a notable PMH of HLD, HTN and L. temporal cavernoma s/p resection (2004) c/b focal epilepsy who presented to [facility] on 10/27/20 as a code stroke for R. hemiparesis and aphasia later determined to be focal myoclonic seizure with impaired awareness with resultant clinical NCSE due to undetermined etiology. S/P IV Ativan 2mg x 2, Keppra 2g and 200mg IV Vimpat without return to baseline or resolution of myoclonic twitching.  Pt subsequently intubated for refractory NCSE, started on propofol gtt which improved clinical events.  Extubated 10/28 AM. S/p phenylephrine for propofol related hypotension. S/p 20mg IVP etomidate. S/p LD of 200mg IV fos-PHT for subtherapeutic PHT (9.7).      OVERNIGHT EVENTS: No acute events.    MEDICATIONS  (STANDING):  diVALproex  milliGRAM(s) Oral daily  diVALproex DR 1000 milliGRAM(s) Oral at bedtime  enoxaparin Injectable 40 milliGRAM(s) SubCutaneous <User Schedule>  escitalopram 10 milliGRAM(s) Oral daily  finasteride 5 milliGRAM(s) Oral daily  lacosamide 100 milliGRAM(s) Oral two times a day  levETIRAcetam  IVPB 1500 milliGRAM(s) IV Intermittent every 12 hours  losartan 50 milliGRAM(s) Oral daily  phenytoin   Capsule 300 milliGRAM(s) Oral at bedtime  polyethylene glycol 3350 17 Gram(s) Oral every 12 hours  risperiDONE   Tablet 0.5 milliGRAM(s) Oral two times a day  senna 2 Tablet(s) Oral at bedtime  simvastatin 10 milliGRAM(s) Oral at bedtime  sodium chloride 0.9%. 1000 milliLiter(s) (75 mL/Hr) IV Continuous <Continuous>    MEDICATIONS  (PRN):  fosphenytoin IVPB 1900 milliGRAM(s) PE IV Intermittent once PRN Seizure refractory to Keppra loading dose      Vital Signs Last 24 Hrs  T(C): 37 (30 Oct 2020 08:00), Max: 37.1 (29 Oct 2020 12:36)  T(F): 98.6 (30 Oct 2020 08:00), Max: 98.8 (29 Oct 2020 15:58)  HR: 75 (30 Oct 2020 08:00) (73 - 80)  BP: 157/82 (30 Oct 2020 08:00) (133/67 - 157/82)  RR: 18 (30 Oct 2020 08:00) (18 - 18)  SpO2: 97% (30 Oct 2020 08:00) (94% - 100%)    Labs:                        13.5   7.11  )-----------( 170      ( 30 Oct 2020 05:41 )             38.6     10-30    143  |  104  |  10  ----------------------------<  89  3.6   |  28  |  0.80    Ca    8.7      30 Oct 2020 05:41      LIVER FUNCTIONS - ( 27 Oct 2020 23:38 )  Alb: 4.5 g/dL / Pro: 6.9 g/dL / ALK PHOS: 70 U/L / ALT: 12 U/L / AST: 18 U/L / GGT: x             Culture - Blood (collected 28 Oct 2020 07:36)  Source: .Blood Blood-Peripheral  Preliminary Report (29 Oct 2020 08:01):    No growth to date.    Culture - Blood (collected 28 Oct 2020 04:45)  Source: .Blood Blood-Peripheral  Preliminary Report (29 Oct 2020 05:01):    No growth to date.    Culture - Urine (collected 28 Oct 2020 04:27)  Source: .Urine Clean Catch (Midstream)  Final Report (29 Oct 2020 07:14):    No growth      PT/INR - ( 27 Oct 2020 23:38 )   PT: 12.3 sec;   INR: 1.03 ratio    PTT - ( 27 Oct 2020 23:38 )  PTT:34.9 sec      PHYSICAL EXAMINATION:  General: Well-developed, well nourished, in no acute distress.  Mental Status:  Alert, awake, oriented to person, place, and time; Speech is fluent with intact naming, repetition, and comprehension.  Cranial Nerves II-XII: grossly intact.  Motor:  Strength is 5/5 throughout.  There is no prontator drift.  Normal muscle bulk and tone throughout.  Reflexes:  2+ and symmetric at the biceps, triceps, brachioradialis, knees, and ankles.  Plantar responses flexor.  Sensory:  Intact throughout to vibration, and joint-position, light touch, pin prick.  Coordination:  Finger-nose-finger and heel-knee-shin intact without dysmetria.  Rapid alternating hand movements intact.  Gait:  unable to assess at this time.    EEG Summary: 10/30/20  Abnormal EEG in the awake, drowsy and asleep states.  spikes, focal, left anterior temporal region  continuous polymorphic delta, focal, left temporal region    Impression/Clinical Correlate:  There is evidence of a potententially epileptogenic structural abnormality in the left anterior temporal region. There is also evidence of skull defect in the same area.

## 2020-10-30 NOTE — DISCHARGE NOTE PROVIDER - CARE PROVIDER_API CALL
Jacinda Hoover)  EEGEpilepsy; Neurology  [address], [address]  [place] [address]  Phone: [phone]  Fax: ()-  Follow Up Time: 1 week

## 2020-10-30 NOTE — EEG REPORT - NS EEG TEXT BOX
History:  70y R-handed man with a notable PMH of HLD, HTN and L. temporal cavernoma s/p resection (2004) c/b focal epilepsy who presented to [facility] on 10/27/20 as a code stroke for R. hemiparesis and aphasia.  LKN 16:05 10/27/20.  On arrival, PT was noted to have R. facial and R. arm twitching, and required propofol and intubation. Per PT's wife, last seizure prior to this was in 2018 when had R. focal seizure with aphasia as well as GTC requiring intubation.  PT follows outpatient with Dr. Urrutia with home regimen of VPA 500mg qAM + 750mg qHS, LEV 1000mg BID, PHT 400mg qHS 4 times a week alternating with PHT 300mg qHS 3 times a week.  PT is reportedly compliant with all meds, had recent increase stress but no significant sleep deprivation, no EtOH or drug abuse, no recent change in medications, head trauma or other changes.      Home Antiepileptic Medication and Device  Divalproex 500mg AM, 750mg HS  Levetiracetam 1000mg q12  Phenytoin 400mg HS M,W,F,[place]; 300mg HS Tu, Th, Sa    Interpretation:    Starting: Day 2      10/29/2020      Time: 8:00:58 AM Duration: 24h    Daily EEG Visual Analysis  FINDINGS:  The background was continuous, spontaneously variable and reactive. During wakefulness, there were fragments of 9-10Hz posterior rhythm, with amplitude to 30 uV.  Low amplitude frontal beta was noted in wakefulness.    Background Slowing:  No generalized background slowing was present.  Background predominantly consisted of theta, delta, and faster activities.     Focal Slowing:   There is continuous polymorphic delta slowing in the left anterior temporal region.    Sleep Background:  Drowsiness was characterized by fragmentation, attenuation, and slowing of the background activity.    Sleep was characterized by the presence of vertex waves, symmetric sleep spindles and K-complexes.    Other Non-Epileptiform Findings:  None were present.  Breach effect in left anterior temporal region characterized by higher amplitude, sharply contoured waves and fast activities.    Interictal Epileptiform Activity:   There are frequent spikes from the left anterior temporal region (also region of breach artifact)    Events:  No events or seizures recorded.    Artifacts:  Intermittent myogenic and movement artifacts were noted.    ECG:  The heart rate on single channel ECG was predominantly between 60-70 BPM.    AEDs:  diVALproex  milliGRAM(s) Oral daily  diVALproex DR 1500 milliGRAM(s) Oral at bedtime  lacosamide Solution 200 milliGRAM(s) Oral two times a day  levETIRAcetam  IVPB 1500 milliGRAM(s) IV Intermittent every 12 hours  phenytoin   Suspension 300 milliGRAM(s) Oral at bedtime        EEG Summary:  Abnormal EEG in the awake, drowsy and asleep states.  spikes, focal, left anterior temporal region  continuous polymorphic delta, focal, left temporal region    Impression/Clinical Correlate:    There is evidence of a potententially epileptogenic structural abnormality in the left anterior temporal region. There is also evidence of skull defect in the same area.    Pito Person MD  Epilepsy Fellow, [facility]    This Preliminary report is based on fellow review. Final report pending attending review.   Interpretation:    Starting: Day 2      10/29/2020      Time: 8:00:58 AM Duration: 24h    Daily EEG Visual Analysis  FINDINGS:  The background was continuous, spontaneously variable and reactive. During wakefulness, there were fragments of 9-10Hz posterior rhythm, with amplitude to 30 uV.  Low amplitude frontal beta was noted in wakefulness.    Background Slowing:  No generalized background slowing was present.  Background predominantly consisted of theta, delta, and faster activities.     Focal Slowing:   There is continuous polymorphic delta slowing in the left anterior temporal region.    Sleep Background:  Drowsiness was characterized by fragmentation, attenuation, and slowing of the background activity.    Sleep was characterized by the presence of vertex waves, symmetric sleep spindles and K-complexes.    Other Non-Epileptiform Findings:  None were present.  Breach effect in left anterior temporal region characterized by higher amplitude, sharply contoured waves and fast activities.    Interictal Epileptiform Activity:   There are frequent spikes from the left anterior temporal region (also region of breach artifact)    Events:  No events or seizures recorded.    Artifacts:  Intermittent myogenic and movement artifacts were noted.    ECG:  The heart rate on single channel ECG was predominantly between 60-70 BPM.    AEDs:  diVALproex  milliGRAM(s) Oral daily  diVALproex DR 1500 milliGRAM(s) Oral at bedtime  lacosamide Solution 200 milliGRAM(s) Oral two times a day  levETIRAcetam  IVPB 1500 milliGRAM(s) IV Intermittent every 12 hours  phenytoin   Suspension 300 milliGRAM(s) Oral at bedtime        EEG Summary:  Abnormal EEG in the awake, drowsy and asleep states.  spikes, focal, left anterior temporal region  continuous polymorphic delta, focal, left temporal region    Impression/Clinical Correlate:    There is evidence of a potententially epileptogenic structural abnormality in the left anterior temporal region. There is also evidence of skull defect in the same area.    Pito Person MD  Epilepsy Fellow, [facility]    Linus Kennedy MD  Epilepsy Attending

## 2020-10-30 NOTE — DISCHARGE NOTE PROVIDER - NSDCFUSCHEDAPPT_GEN_ALL_CORE_FT
MAURO NUNEZ ; 11/11/2020 ; [facility] [address] [facility]  MAURO NUNEZ ; 11/18/2020 ; [facility] [address] [facility]

## 2020-10-30 NOTE — CONSULT NOTE ADULT - SUBJECTIVE AND OBJECTIVE BOX
70y Male was admitted on 10-28    Patient is a 70y old  Male who presents with a chief complaint of Status epilepticus (30 Oct 2020 09:03)    HPI:  HPI: 70y R-handed man with a notable PMH of HLD, HTN and L. temporal cavernoma s/p resection (2004) c/b focal epilepsy who presented to [facility] on 10/27/20 as a code stroke for R. hemiparesis and aphasia.  LKN 16:05 10/27/20.  On arrival, PT was noted to have R. facial and R. arm twitching.  Wife called and noted that PT's current presentation was consistent with his prior seizures and that he had 3 seizures prior to arrival which had lasted 2-4 minutes without return to full baseline.  She had given PT ativan 1 mg after second seizure but did not break the event.  Finally after third seizure with resultant R. hemiparesis, aphasia and urinary incontinence she sent the PT to ED.    In the ED, PT was hypertensive, hypoxic, and had NIHSS of 21 with GCS of 4.  CT head was done which noted no acute large mass, hemorrhage or new large territorial stroke.  CTA H&N done which noted no LVO.  CTP no core infarct, penumbra of 24mL (though in B/L frontal, posterior and cerebellum).  PT was given 2mg ativan 23:33 with no response.  Repeat 2mg ativan 23:53 with some improvement of myoclonic jerks but not to baseline.  PT then given loading dose of Keppra 2000mg with some improvement but still no return to baseline.  3rd line agent of Vimpat 200mg IV given x 1 without significant improvement.  Due to ongoing seizure >40 min despite 3 agents discussed with PT's wife regarding need for intubation and further seizure management for NCSE.  Initially, wife was reluctant due to prolonged intubation and perceived bad experience when Pt last needed to be intubated in 2018 after GTC.  After further discussion and understanding, wife agreed to intubation.  PT then intubated and started on propofol gtt at 40mcg/kg/min.  Lip myoclonic jerking/automatisms significantly improved but PT's BP rapidly dropped.  Rate reduced to 30 with persistent hypotension.  PT was accepted to NSCU for close monitoring.  Propofol paused for 5 minutes en route from ED to NSCU and by time PT was at NSCU BP significantly improved but automatisms of lip myoclonic rhythmic movements returned.  Patient was extubated on 10/28. S/p phenylephrine for propofol related hypotension. S/p 20mg IVP etomidate. S/p LD of 200mg IV fos-PHT for subtherapeutic PHT (9.7).  UA + CX, Blood CX, COVID 19 all negative.  EEG on 10/30/20 showing spikes, focal, left anterior temporal region and continuous polymorphic delta, focal, left temporal region.     Per PT's wife, last seizure prior to this was in 2018 when had R. focal seizure with aphasia as well as GTC requiring intubation.  PT follows outpatient with Dr. Roberts with home regimen of VPA 500mg qAM + 750mg qHS, LEV 1000mg BID, PHT 400mg qHS 4 times a week alternating with PHT 300mg qHS 3 times a week.  PT is reportedly compliant with all meds, had recent increase stress but no significant sleep deprivation, no EtOH or drug abuse, no recent change in medications, head trauma or other changes.        Seen by S/S, yousuf soft diet with supervision      TODAY'S SUBJECTIVE HX:    REVIEW OF SYSTEMS  Constitutional - No fever, No weight loss, No fatigue  HEENT - No eye pain, No visual disturbances, No difficulty hearing, No tinnitus, No vertigo, No neck pain  Respiratory - No cough, No wheezing, No shortness of breath  Cardiovascular - No chest pain, No palpitations  Gastrointestinal - No abdominal pain, No nausea, No vomiting, No diarrhea, No constipation  Genitourinary - No dysuria, No frequency, No hematuria, No incontinence  Neurological - No headaches, No memory loss, No loss of strength, No numbness, No tremors  Skin - No itching, No rashes, No lesions   Endocrine - No temperature intolerance  Musculoskeletal - No joint pain, No joint swelling, No muscle pain  Psychiatric - No depression, No anxiety    VITALS  T(C): 36.6 (10-30-20 @ 11:00), Max: 37.1 (10-29-20 @ 15:58)  HR: 73 (10-30-20 @ 11:00) (73 - 80)  BP: 158/85 (10-30-20 @ 11:00) (133/67 - 158/85)  RR: 18 (10-30-20 @ 11:00) (18 - 18)  SpO2: 97% (10-30-20 @ 11:00) (94% - 100%)  Wt(kg): --    PAST MEDICAL & SURGICAL HISTORY  Partial symptomatic epilepsy with complex partial seizures, not intractable, without status epilepticus    Other hyperlipidemia    Essential hypertension    Cavernoma    Status post craniectomy    No significant past surgical history        SOCIAL HISTORY  Smoking - Denied  EtOH - Denied   Drugs - Denied    FUNCTIONAL HISTORY   Pt lives with spouse in private house. As per wife, full flight of SHELLEY. Wife states that she helped pt in the shower. Pt was Independent with dressing and toileting. Pt does not own any DME as wife states that pt refuses any AD/DME.    CURRENT FUNCTIONAL STATUS 10/28  Bed mobility: CG  Transfers: CG  Gait: min a 10 feet x 2       FAMILY HISTORY   No pertinent family history in first degree relatives        RECENT LABS/IMAGING  CBC Full  -  ( 30 Oct 2020 05:41 )  WBC Count : 7.11 K/uL  RBC Count : 4.36 M/uL  Hemoglobin : 13.5 g/dL  Hematocrit : 38.6 %  Platelet Count - Automated : 170 K/uL  Mean Cell Volume : 88.5 fl  Mean Cell Hemoglobin : 31.0 pg  Mean Cell Hemoglobin Concentration : 35.0 gm/dL  Auto Neutrophil # : x  Auto Lymphocyte # : x  Auto Monocyte # : x  Auto Eosinophil # : x  Auto Basophil # : x  Auto Neutrophil % : x  Auto Lymphocyte % : x  Auto Monocyte % : x  Auto Eosinophil % : x  Auto Basophil % : x    10-30    143  |  104  |  10  ----------------------------<  89  3.6   |  28  |  0.80    Ca    8.7      30 Oct 2020 05:41          ALLERGIES  No Known Allergies      MEDICATIONS   diVALproex  milliGRAM(s) Oral daily  diVALproex DR 1000 milliGRAM(s) Oral at bedtime  enoxaparin Injectable 40 milliGRAM(s) SubCutaneous <User Schedule>  escitalopram 10 milliGRAM(s) Oral daily  finasteride 5 milliGRAM(s) Oral daily  fosphenytoin IVPB 1900 milliGRAM(s) PE IV Intermittent once PRN  lacosamide 100 milliGRAM(s) Oral two times a day  levETIRAcetam  IVPB 1500 milliGRAM(s) IV Intermittent every 12 hours  losartan 50 milliGRAM(s) Oral daily  phenytoin   Capsule 300 milliGRAM(s) Oral at bedtime  polyethylene glycol 3350 17 Gram(s) Oral every 12 hours  risperiDONE   Tablet 0.5 milliGRAM(s) Oral two times a day  senna 2 Tablet(s) Oral at bedtime  simvastatin 10 milliGRAM(s) Oral at bedtime  sodium chloride 0.9%. 1000 milliLiter(s) IV Continuous <Continuous>       70y Male was admitted on 10-28    Patient is a 70y old  Male who presents with a chief complaint of Status epilepticus (30 Oct 2020 09:03)    HPI:  HPI: 70y R-handed man with a notable PMH of HLD, HTN and L. temporal cavernoma s/p resection (2004) c/b focal epilepsy who presented to [facility] on 10/27/20 as a code stroke for R. hemiparesis and aphasia.  LKN 16:05 10/27/20.  On arrival, PT was noted to have R. facial and R. arm twitching.  Wife called and noted that PT's current presentation was consistent with his prior seizures and that he had 3 seizures prior to arrival which had lasted 2-4 minutes without return to full baseline.  She had given PT ativan 1 mg after second seizure but did not break the event.  Finally after third seizure with resultant R. hemiparesis, aphasia and urinary incontinence she sent the PT to ED.    In the ED, PT was hypertensive, hypoxic, and had NIHSS of 21 with GCS of 4.  CT head was done which noted no acute large mass, hemorrhage or new large territorial stroke.  CTA H&N done which noted no LVO.  CTP no core infarct, penumbra of 24mL (though in B/L frontal, posterior and cerebellum).  PT was given 2mg ativan 23:33 with no response.  Repeat 2mg ativan 23:53 with some improvement of myoclonic jerks but not to baseline.  PT then given loading dose of Keppra 2000mg with some improvement but still no return to baseline.  3rd line agent of Vimpat 200mg IV given x 1 without significant improvement.  Due to ongoing seizure >40 min despite 3 agents discussed with PT's wife regarding need for intubation and further seizure management for NCSE.  Initially, wife was reluctant due to prolonged intubation and perceived bad experience when Pt last needed to be intubated in 2018 after GTC.  After further discussion and understanding, wife agreed to intubation.  PT then intubated and started on propofol gtt at 40mcg/kg/min.  Lip myoclonic jerking/automatisms significantly improved but PT's BP rapidly dropped.  Rate reduced to 30 with persistent hypotension.  PT was accepted to NSCU for close monitoring.  Propofol paused for 5 minutes en route from ED to NSCU and by time PT was at NSCU BP significantly improved but automatisms of lip myoclonic rhythmic movements returned.  Patient was extubated on 10/28. S/p phenylephrine for propofol related hypotension. S/p 20mg IVP etomidate. S/p LD of 200mg IV fos-PHT for subtherapeutic PHT (9.7).  UA + CX, Blood CX, COVID 19 all negative.  EEG on 10/30/20 showing spikes, focal, left anterior temporal region and continuous polymorphic delta, focal, left temporal region.     Per PT's wife, last seizure prior to this was in 2018 when had R. focal seizure with aphasia as well as GTC requiring intubation.  PT follows outpatient with Dr. Roberts with home regimen of VPA 500mg qAM + 750mg qHS, LEV 1000mg BID, PHT 400mg qHS 4 times a week alternating with PHT 300mg qHS 3 times a week.  PT is reportedly compliant with all meds, had recent increase stress but no significant sleep deprivation, no EtOH or drug abuse, no recent change in medications, head trauma or other changes.      Seen by S/S, yousuf soft diet with supervision    TODAY'S SUBJECTIVE HX:  Patient denies any complaint at this time. As per nurse, he has resolving hematuria. Patient does not recall events of injury    REVIEW OF SYSTEMS  Constitutional - No fever  HEENT - No eye pain, No visual disturbances, No difficulty hearing, No tinnitus, No vertigo, No neck pain  Respiratory - No cough, No wheezing, No shortness of breath  Cardiovascular - No chest pain, No palpitations  Gastrointestinal - No abdominal pain, No nausea, No vomiting, No diarrhea, No constipation  Genitourinary - resolving hematuria  Neurological - No headaches, No memory loss, Endorses chronic left sided weakness  Musculoskeletal - No joint pain, No joint swelling, No muscle pain  Psychiatric - No depression, No anxiety    VITALS  T(C): 36.6 (10-30-20 @ 11:00), Max: 37.1 (10-29-20 @ 15:58)  HR: 73 (10-30-20 @ 11:00) (73 - 80)  BP: 158/85 (10-30-20 @ 11:00) (133/67 - 158/85)  RR: 18 (10-30-20 @ 11:00) (18 - 18)  SpO2: 97% (10-30-20 @ 11:00) (94% - 100%)  Wt(kg): --    PAST MEDICAL & SURGICAL HISTORY  Partial symptomatic epilepsy with complex partial seizures, not intractable, without status epilepticus    Other hyperlipidemia    Essential hypertension    Cavernoma    Status post craniectomy    No significant past surgical histor    SOCIAL HISTORY  Smoking - Denied  EtOH - Denied   Drugs - Denied    FUNCTIONAL HISTORY   Pt lives with spouse in private house. As per wife, full flight of SHELLEY approx 13 total. Wife states that she helped pt in the shower. Pt was Independent with dressing and toileting. Pt does not own any DME as wife states that pt refuses any AD/DME.    CURRENT FUNCTIONAL STATUS 10/28  Bed mobility: CG  Transfers: CG  Gait: min a 10 feet x 2       FAMILY HISTORY   No pertinent family history in first degree relatives      RECENT LABS/IMAGING  CBC Full  -  ( 30 Oct 2020 05:41 )  WBC Count : 7.11 K/uL  RBC Count : 4.36 M/uL  Hemoglobin : 13.5 g/dL  Hematocrit : 38.6 %  Platelet Count - Automated : 170 K/uL  Mean Cell Volume : 88.5 fl  Mean Cell Hemoglobin : 31.0 pg  Mean Cell Hemoglobin Concentration : 35.0 gm/dL  Auto Neutrophil # : x  Auto Lymphocyte # : x  Auto Monocyte # : x  Auto Eosinophil # : x  Auto Basophil # : x  Auto Neutrophil % : x  Auto Lymphocyte % : x  Auto Monocyte % : x  Auto Eosinophil % : x  Auto Basophil % : x    10-30    143  |  104  |  10  ----------------------------<  89  3.6   |  28  |  0.80    Ca    8.7      30 Oct 2020 05:41          ALLERGIES  No Known Allergies      MEDICATIONS   diVALproex  milliGRAM(s) Oral daily  diVALproex DR 1000 milliGRAM(s) Oral at bedtime  enoxaparin Injectable 40 milliGRAM(s) SubCutaneous <User Schedule>  escitalopram 10 milliGRAM(s) Oral daily  finasteride 5 milliGRAM(s) Oral daily  fosphenytoin IVPB 1900 milliGRAM(s) PE IV Intermittent once PRN  lacosamide 100 milliGRAM(s) Oral two times a day  levETIRAcetam  IVPB 1500 milliGRAM(s) IV Intermittent every 12 hours  losartan 50 milliGRAM(s) Oral daily  phenytoin   Capsule 300 milliGRAM(s) Oral at bedtime  polyethylene glycol 3350 17 Gram(s) Oral every 12 hours  risperiDONE   Tablet 0.5 milliGRAM(s) Oral two times a day  senna 2 Tablet(s) Oral at bedtime  simvastatin 10 milliGRAM(s) Oral at bedtime  sodium chloride 0.9%. 1000 milliLiter(s) IV Continuous <Continuous>    ----------------------------------------------------------------------------------------  PHYSICAL EXAM  Constitutional - NAD, Comfortable, EEG ongoing  HEENT - EOMI  Neck - Supple, No limited ROM  Chest - Breathing comfortably, No wheezing  Cardiovascular - S1S2   Abdomen - Soft   Extremities - No C/C/E, No calf tenderness   Neurologic Exam -                    Cognitive - Awake, Alert, AAO to self, place, date, year, not aware of situation     Communication - Fluent, No dysarthria     Cranial Nerves - CN 2-12 intact     Motor -                    LEFT    UE - ShAB 4+/5, EF 4+/5, EE 5/5, WE 5/5,  5/5                    RIGHT UE - ShAB 5/5, EF 5/5, EE 5/5, WE 5/5,  5/5                    LEFT    LE - HF 5/5, KE 5/5, DF 5/5, PF 5/5                    RIGHT LE - HF 5/5, KE 5/5, DF 5/5, PF 5/5        Sensory - Intact to LT     Reflexes - DTR Intact, No primitive reflexive     Coordination - FTN intac     Balance - poor  Psychiatric - Mood stable, Affect WNL    ------------------------------------------------------------------------------------------------  ASSESSMENT/PLAN  70y R-handed M with h/o, HTN, HLD and L. temporal cavernoma s/p resection (2004) c/b focal epilepsy who presented as a code stroke due to aphasia and R. sided hemiparesis later determined to be focal myoclonic seizure with impaired awareness with resultant clinical NCSE likely from L fronto-temporal etiology due to known focal epilepsy with unclear provoking trigger vs. breakthrough seizure.  Seizure- c/w anticonvulsants, EEG ongoing  DVT PPX - SCDs, lovenox    Disposition -  PT - ROM, Bed Mob, Transfers, Amb with AD   OT - ADLs, ROM  SLP - Dysphagia eval and treat  Precautions - Falls, Cardiac  Skin - Turn Q2hrs  Diet - SOft     Rehab -   at this time would Recommend ACUTE inpatient rehabilitation for the functional deficits consisting of 3 hours of therapy/day & 24 hour RN/daily PMR physician for comorbid medical management. Will continue to follow for ongoing rehab needs and recommendations. Patient will be able to tolerate 3 hours a day.'  wife however prefers to take patient home, if patient is dcd home, would reccomend home with home therapy

## 2020-10-30 NOTE — DISCHARGE NOTE PROVIDER - HOSPITAL COURSE
70y R-handed M with h/o, HTN, HLD and L. temporal cavernoma s/p resection (2004) c/b focal epilepsy who presented as a code stroke due to aphasia and R. sided hemiparesis later determined to be focal myoclonic seizure with impaired awareness with resultant clinical NCSE likely from L fronto-temporal etiology due to known focal epilepsy with unclear provoking trigger vs. breakthrough seizure. Last seizure 2 years prior. [facility] Pt with great improvement.    EEG Summary: 10/30/20  Abnormal EEG in the awake, drowsy and asleep states.  spikes, focal, left anterior temporal region  continuous polymorphic delta, focal, left temporal region    Impression/Clinical Correlate:  There is evidence of a potententially epileptogenic structural abnormality in the left anterior temporal region. There is also evidence of skull defect in the same area.    Pt is to continue Phenytoin 300mg at bedtime . Vimpat was decreased to 100mg bid for doses and then discontinued. Pt was also instructed to take Depakote 500mg in the morning and 1000mg at bedtime. 70y R-handed M with h/o, HTN, HLD and L. temporal cavernoma s/p resection (2004) c/b focal epilepsy who presented as a code stroke due to aphasia and R. sided hemiparesis later determined to be focal myoclonic seizure with impaired awareness with resultant clinical NCSE likely from L fronto-temporal etiology due to known focal epilepsy with unclear provoking trigger vs. breakthrough seizure. Last seizure 2 years prior. [facility] Pt with great improvement.    EEG Summary: 10/30/20  Abnormal EEG in the awake, drowsy and asleep states.  spikes, focal, left anterior temporal region  continuous polymorphic delta, focal, left temporal region    Impression/Clinical Correlate:  There is evidence of a potententially epileptogenic structural abnormality in the left anterior temporal region. There is also evidence of skull defect in the same area.    Pt is to continue Phenytoin 300mg at bedtime . Vimpat was decreased to 100mg bid for two doses and then discontinued. Pt was also instructed to take Depakote 500mg in the morning and 1000mg at bedtime.     Discharge AEDS:   Keppra 1.5g BID  PHT 300mg QHS  VPA 500mg/1000mg

## 2020-10-30 NOTE — EEG REPORT - HISTORY
Disp Refills Start End    escitalopram (LEXAPRO) 10 MG tablet 30 tablet 5 10/19/2017     Sig - Route: Take 1 tablet by mouth at bedtime or with evening meal. - Oral    Class: Eprescribe      Patient calls and is feeling down and depressed, lack of energy, NO suicidal thoughts.  Is feeling like she would like an increase of her medication.  Wondering what your recommendation would be?  Patient would like a call back.     Last appointment:  10/19/2018  Next Appointment:  04/19/2018      
Ok 20mg daily till next appointment  
Patient was called and will start taking Lexapro 20 mg starting tonight.   
seizures/treatment resistant epilepsy
treatment resistant epilepsy/seizures

## 2020-10-30 NOTE — DISCHARGE NOTE PROVIDER - CARE PROVIDERS DIRECT ADDRESSES
[email or web address]
Detail Level: Zone
Detail Level: Detailed

## 2020-10-31 ENCOUNTER — TRANSCRIPTION ENCOUNTER (OUTPATIENT)
Age: 70
End: 2020-10-31

## 2020-10-31 LAB
ALBUMIN SERPL ELPH-MCNC: 3.5 G/DL — SIGNIFICANT CHANGE UP (ref 3.3–5)
ALP SERPL-CCNC: 49 U/L — SIGNIFICANT CHANGE UP (ref 40–120)
ALT FLD-CCNC: 13 U/L — SIGNIFICANT CHANGE UP (ref 10–45)
ANION GAP SERPL CALC-SCNC: 12 MMOL/L — SIGNIFICANT CHANGE UP (ref 5–17)
AST SERPL-CCNC: 23 U/L — SIGNIFICANT CHANGE UP (ref 10–40)
BASOPHILS # BLD AUTO: 0.04 K/UL — SIGNIFICANT CHANGE UP (ref 0–0.2)
BASOPHILS NFR BLD AUTO: 0.6 % — SIGNIFICANT CHANGE UP (ref 0–2)
BILIRUB SERPL-MCNC: 0.5 MG/DL — SIGNIFICANT CHANGE UP (ref 0.2–1.2)
BUN SERPL-MCNC: 14 MG/DL — SIGNIFICANT CHANGE UP (ref 7–23)
CALCIUM SERPL-MCNC: 8.6 MG/DL — SIGNIFICANT CHANGE UP (ref 8.4–10.5)
CHLORIDE SERPL-SCNC: 102 MMOL/L — SIGNIFICANT CHANGE UP (ref 96–108)
CO2 SERPL-SCNC: 27 MMOL/L — SIGNIFICANT CHANGE UP (ref 22–31)
CREAT SERPL-MCNC: 0.81 MG/DL — SIGNIFICANT CHANGE UP (ref 0.5–1.3)
EOSINOPHIL # BLD AUTO: 0.04 K/UL — SIGNIFICANT CHANGE UP (ref 0–0.5)
EOSINOPHIL NFR BLD AUTO: 0.6 % — SIGNIFICANT CHANGE UP (ref 0–6)
GLUCOSE SERPL-MCNC: 83 MG/DL — SIGNIFICANT CHANGE UP (ref 70–99)
HCT VFR BLD CALC: 38.2 % — LOW (ref 39–50)
HGB BLD-MCNC: 13.1 G/DL — SIGNIFICANT CHANGE UP (ref 13–17)
IMM GRANULOCYTES NFR BLD AUTO: 0.4 % — SIGNIFICANT CHANGE UP (ref 0–1.5)
LEVETIRACETAM SERPL-MCNC: 50.4 UG/ML — HIGH (ref 10–40)
LYMPHOCYTES # BLD AUTO: 2.48 K/UL — SIGNIFICANT CHANGE UP (ref 1–3.3)
LYMPHOCYTES # BLD AUTO: 36.6 % — SIGNIFICANT CHANGE UP (ref 13–44)
MCHC RBC-ENTMCNC: 30.5 PG — SIGNIFICANT CHANGE UP (ref 27–34)
MCHC RBC-ENTMCNC: 34.3 GM/DL — SIGNIFICANT CHANGE UP (ref 32–36)
MCV RBC AUTO: 89 FL — SIGNIFICANT CHANGE UP (ref 80–100)
MONOCYTES # BLD AUTO: 0.86 K/UL — SIGNIFICANT CHANGE UP (ref 0–0.9)
MONOCYTES NFR BLD AUTO: 12.7 % — SIGNIFICANT CHANGE UP (ref 2–14)
NEUTROPHILS # BLD AUTO: 3.33 K/UL — SIGNIFICANT CHANGE UP (ref 1.8–7.4)
NEUTROPHILS NFR BLD AUTO: 49.1 % — SIGNIFICANT CHANGE UP (ref 43–77)
NRBC # BLD: 0 /100 WBCS — SIGNIFICANT CHANGE UP (ref 0–0)
PHENYTOIN FREE SERPL-MCNC: 9 UG/ML — LOW (ref 10–20)
PLATELET # BLD AUTO: 166 K/UL — SIGNIFICANT CHANGE UP (ref 150–400)
POTASSIUM SERPL-MCNC: 3.6 MMOL/L — SIGNIFICANT CHANGE UP (ref 3.5–5.3)
POTASSIUM SERPL-SCNC: 3.6 MMOL/L — SIGNIFICANT CHANGE UP (ref 3.5–5.3)
PROT SERPL-MCNC: 5.7 G/DL — LOW (ref 6–8.3)
RBC # BLD: 4.29 M/UL — SIGNIFICANT CHANGE UP (ref 4.2–5.8)
RBC # FLD: 13.1 % — SIGNIFICANT CHANGE UP (ref 10.3–14.5)
SODIUM SERPL-SCNC: 141 MMOL/L — SIGNIFICANT CHANGE UP (ref 135–145)
VALPROATE SERPL-MCNC: 65 UG/ML — SIGNIFICANT CHANGE UP (ref 50–100)
WBC # BLD: 6.78 K/UL — SIGNIFICANT CHANGE UP (ref 3.8–10.5)
WBC # FLD AUTO: 6.78 K/UL — SIGNIFICANT CHANGE UP (ref 3.8–10.5)

## 2020-10-31 PROCEDURE — 95720 EEG PHY/QHP EA INCR W/VEEG: CPT

## 2020-10-31 PROCEDURE — 99231 SBSQ HOSP IP/OBS SF/LOW 25: CPT

## 2020-10-31 RX ORDER — LEVETIRACETAM 250 MG/1
1.5 TABLET, FILM COATED ORAL
Qty: 270 | Refills: 0
Start: 2020-10-31 | End: 2021-01-28

## 2020-10-31 RX ORDER — LOSARTAN POTASSIUM 100 MG/1
1 TABLET, FILM COATED ORAL
Qty: 0 | Refills: 0 | DISCHARGE
Start: 2020-10-31

## 2020-10-31 RX ORDER — LOSARTAN POTASSIUM 100 MG/1
1 TABLET, FILM COATED ORAL
Qty: 0 | Refills: 0 | DISCHARGE

## 2020-10-31 RX ORDER — ESCITALOPRAM OXALATE 10 MG/1
1 TABLET, FILM COATED ORAL
Qty: 0 | Refills: 0 | DISCHARGE
Start: 2020-10-31

## 2020-10-31 RX ORDER — DIVALPROEX SODIUM 500 MG/1
1 TABLET, DELAYED RELEASE ORAL
Qty: 90 | Refills: 0
Start: 2020-10-31 | End: 2021-01-28

## 2020-10-31 RX ORDER — FINASTERIDE 5 MG/1
1 TABLET, FILM COATED ORAL
Qty: 0 | Refills: 0 | DISCHARGE
Start: 2020-10-31

## 2020-10-31 RX ORDER — SIMVASTATIN 20 MG/1
1 TABLET, FILM COATED ORAL
Qty: 0 | Refills: 0 | DISCHARGE
Start: 2020-10-31

## 2020-10-31 RX ORDER — SIMVASTATIN 20 MG/1
1 TABLET, FILM COATED ORAL
Qty: 0 | Refills: 0 | DISCHARGE

## 2020-10-31 RX ORDER — LACOSAMIDE 50 MG/1
100 TABLET ORAL ONCE
Refills: 0 | Status: DISCONTINUED | OUTPATIENT
Start: 2020-10-31 | End: 2020-10-31

## 2020-10-31 RX ORDER — ESCITALOPRAM OXALATE 10 MG/1
1 TABLET, FILM COATED ORAL
Qty: 0 | Refills: 0 | DISCHARGE

## 2020-10-31 RX ORDER — FINASTERIDE 5 MG/1
1 TABLET, FILM COATED ORAL
Qty: 0 | Refills: 0 | DISCHARGE

## 2020-10-31 RX ORDER — DIVALPROEX SODIUM 500 MG/1
2 TABLET, DELAYED RELEASE ORAL
Qty: 180 | Refills: 0
Start: 2020-10-31 | End: 2021-01-28

## 2020-10-31 RX ADMIN — LOSARTAN POTASSIUM 50 MILLIGRAM(S): 100 TABLET, FILM COATED ORAL at 05:11

## 2020-10-31 RX ADMIN — DIVALPROEX SODIUM 1000 MILLIGRAM(S): 500 TABLET, DELAYED RELEASE ORAL at 21:36

## 2020-10-31 RX ADMIN — ESCITALOPRAM OXALATE 10 MILLIGRAM(S): 10 TABLET, FILM COATED ORAL at 11:06

## 2020-10-31 RX ADMIN — RISPERIDONE 0.5 MILLIGRAM(S): 4 TABLET ORAL at 17:00

## 2020-10-31 RX ADMIN — LACOSAMIDE 100 MILLIGRAM(S): 50 TABLET ORAL at 16:27

## 2020-10-31 RX ADMIN — LACOSAMIDE 100 MILLIGRAM(S): 50 TABLET ORAL at 05:11

## 2020-10-31 RX ADMIN — Medication 300 MILLIGRAM(S): at 21:37

## 2020-10-31 RX ADMIN — RISPERIDONE 0.5 MILLIGRAM(S): 4 TABLET ORAL at 05:11

## 2020-10-31 RX ADMIN — FINASTERIDE 5 MILLIGRAM(S): 5 TABLET, FILM COATED ORAL at 11:06

## 2020-10-31 RX ADMIN — ENOXAPARIN SODIUM 40 MILLIGRAM(S): 100 INJECTION SUBCUTANEOUS at 17:00

## 2020-10-31 RX ADMIN — DIVALPROEX SODIUM 500 MILLIGRAM(S): 500 TABLET, DELAYED RELEASE ORAL at 11:06

## 2020-10-31 RX ADMIN — SIMVASTATIN 10 MILLIGRAM(S): 20 TABLET, FILM COATED ORAL at 21:37

## 2020-10-31 RX ADMIN — LEVETIRACETAM 400 MILLIGRAM(S): 250 TABLET, FILM COATED ORAL at 05:10

## 2020-10-31 RX ADMIN — LEVETIRACETAM 400 MILLIGRAM(S): 250 TABLET, FILM COATED ORAL at 16:59

## 2020-10-31 NOTE — DISCHARGE NOTE NURSING/CASE MANAGEMENT/SOCIAL WORK - PATIENT PORTAL LINK FT
You can access the FollowMyHealth Patient Portal offered by [facility] by registering at the following website: [email or web address]. By joining [employer]’s FollowMyHealth portal, you will also be able to view your health information using other applications (apps) compatible with our system.

## 2020-10-31 NOTE — PROGRESS NOTE ADULT - SUBJECTIVE AND OBJECTIVE BOX
MAURO NUNEZ  Male  MRN-[id number]    Interval:      VITAL SIGNS:  T(F): 98.6  HR: 74  BP: 168/78  RR: 18  SpO2: 98%    Exam:   MS: Oriented x3. Fluent. Follows crossed commands.   CN: VFF. EOMI. V1-V3 intact. Face symmetric. T/u midline.   Motor: Full strength throughout.   Sensory: Intact to LT throughout   Reflexes: 2+ throughout. Babinski absent bilaterally.   Coordination: No dysmetria on FNF or ataxia on HTS bilaterally   Gait: Deferred    LABS:                          13.1   6.78  )-----------( 166      ( 31 Oct 2020 06:18 )             38.2     10-31    141  |  102  |  14  ----------------------------<  83  3.6   |  27  |  0.81    Ca    8.6      31 Oct 2020 06:18    TPro  5.7<L>  /  Alb  3.5  /  TBili  0.5  /  DBili  x   /  AST  23  /  ALT  13  /  AlkPhos  49  10-31        MEDICATIONS:   diVALproex  milliGRAM(s) Oral daily  diVALproex DR 1000 milliGRAM(s) Oral at bedtime  enoxaparin Injectable 40 milliGRAM(s) SubCutaneous <User Schedule>  escitalopram 10 milliGRAM(s) Oral daily  finasteride 5 milliGRAM(s) Oral daily  fosphenytoin IVPB 1900 milliGRAM(s) PE IV Intermittent once PRN  levETIRAcetam  IVPB 1500 milliGRAM(s) IV Intermittent every 12 hours  losartan 50 milliGRAM(s) Oral daily  phenytoin   Capsule 300 milliGRAM(s) Oral at bedtime  polyethylene glycol 3350 17 Gram(s) Oral every 12 hours  risperiDONE   Tablet 0.5 milliGRAM(s) Oral two times a day  senna 2 Tablet(s) Oral at bedtime  simvastatin 10 milliGRAM(s) Oral at bedtime  sodium chloride 0.9%. 1000 milliLiter(s) IV Continuous <Continuous>          RADIOLOGY & ADDITIONAL STUDIES:             MAURO NUNEZ  Male  [id number]    Interval:    -No acute events.     VITAL SIGNS:  T(F): 98.6  HR: 74  BP: 168/78  RR: 18  SpO2: 98%    Exam:   MS: Oriented x3. Fluent. Follows crossed commands.   CN: VFF. EOMI. V1-V3 intact. Face symmetric. T/u midline.   Motor: Full strength throughout.   Sensory: Intact to LT throughout   Reflexes: 2+ throughout. Babinski absent bilaterally.   Coordination: No dysmetria on FNF or ataxia on HTS bilaterally   Gait: Deferred    LABS:                          13.1   6.78  )-----------( 166      ( 31 Oct 2020 06:18 )             38.2     10-31    141  |  102  |  14  ----------------------------<  83  3.6   |  27  |  0.81    Ca    8.6      31 Oct 2020 06:18    TPro  5.7<L>  /  Alb  3.5  /  TBili  0.5  /  DBili  x   /  AST  23  /  ALT  13  /  AlkPhos  49  10-31        MEDICATIONS:   diVALproex  milliGRAM(s) Oral daily  diVALproex DR 1000 milliGRAM(s) Oral at bedtime  enoxaparin Injectable 40 milliGRAM(s) SubCutaneous <User Schedule>  escitalopram 10 milliGRAM(s) Oral daily  finasteride 5 milliGRAM(s) Oral daily  fosphenytoin IVPB 1900 milliGRAM(s) PE IV Intermittent once PRN  levETIRAcetam  IVPB 1500 milliGRAM(s) IV Intermittent every 12 hours  losartan 50 milliGRAM(s) Oral daily  phenytoin   Capsule 300 milliGRAM(s) Oral at bedtime  polyethylene glycol 3350 17 Gram(s) Oral every 12 hours  risperiDONE   Tablet 0.5 milliGRAM(s) Oral two times a day  senna 2 Tablet(s) Oral at bedtime  simvastatin 10 milliGRAM(s) Oral at bedtime  sodium chloride 0.9%. 1000 milliLiter(s) IV Continuous <Continuous>          RADIOLOGY & ADDITIONAL STUDIES:

## 2020-10-31 NOTE — DISCHARGE NOTE NURSING/CASE MANAGEMENT/SOCIAL WORK - NSDCPEPTSTRK_GEN_ALL_CORE
Stroke warning signs and symptoms/Signs and symptoms of stroke/Call 911 for stroke/Need for follow up after discharge/Prescribed medications/Stroke education booklet/Risk factors for stroke/Stroke support groups for patients, families, and friends

## 2020-10-31 NOTE — PROGRESS NOTE ADULT - ASSESSMENT
70y R-handed M with h/o, HTN, HLD and L. temporal cavernoma s/p resection (2004) c/b focal epilepsy who presented as a code stroke due to aphasia and R. sided hemiparesis later determined to be focal myoclonic seizure with impaired awareness with resultant clinical NCSE likely from L fronto-temporal etiology due to known focal epilepsy with unclear provoking trigger vs. breakthrough seizure. Last seizure 2 years prior. Transferred from NSC[facility] to EMU. Currently stable.      Plan:  [] Discharge on 11/1 AM once [facility] opens to bring up medications.   [] Off EEG   [] AEDS: /1000, Keppra 1.5g BID, ,g QHS. NOW OFF VIMPAT.     PULM: S/p intubation and PS for status epilepticus. SPO2 % on RA.  CARDS: s/p phenylephrine in [facility]. HR 70-91, -170/66-81. Consider resuming losartan 50 mg daily if BP elevated. Troponin T 14. Cont simvastatin.  Renal: NS 75 ml/hr. Monitor urine output.  GI/FEN: Dysphagia screen neg, d/c NGT, on mechanical soft diet. Bowel regimen.   ENDO: Goal euglycemia (-180)  HEME/ONC: SCDs + Lovenox for VTE prophylaxis  ID: CXR neg, UA neg infection, UCx NGTD, BCx x 2 NGTD, COVID-19 PCR + IgG ab neg  Social: Ongoing discussion with pt's health care proxy, Jeannie (wife) [phone]. 70y R-handed M with h/o, HTN, HLD and L. temporal cavernoma s/p resection (2004) c/b focal epilepsy who presented as a code stroke due to aphasia and R. sided hemiparesis later determined to be focal myoclonic seizure with impaired awareness with resultant clinical NCSE likely from L fronto-temporal etiology due to known focal epilepsy with unclear provoking trigger vs. breakthrough seizure. Last seizure 2 years prior. Transferred from NSCU to EMU. Currently stable.      Plan:  [] Discharge home on 11/1 AM once [facility] opens to bring up medications.   [] Off EEG   [] AEDS: /1000, Keppra 1.5g BID, ,g QHS. NOW OFF VIMPAT.     PULM: S/p intubation and PS for status epilepticus. SPO2 % on RA.  CARDS: s/p phenylephrine in [facility]. HR 70-91, -170/66-81. Consider resuming losartan 50 mg daily if BP elevated. Troponin T 14. Cont simvastatin.  Renal: NS 75 ml/hr. Monitor urine output.  GI/FEN: Dysphagia screen neg, d/c NGT, on mechanical soft diet. Bowel regimen.   ENDO: Goal euglycemia (-180)  HEME/ONC: SCDs + Lovenox for VTE prophylaxis  ID: CXR neg, UA neg infection, UCx NGTD, BCx x 2 NGTD, COVID-19 PCR + IgG ab neg  Social: Ongoing discussion with pt's health care proxy, Jeannie (wife) [phone].

## 2020-10-31 NOTE — EEG REPORT - NS EEG TEXT BOX
Starting: Day 3      10/30/2020      Time: 8:00: AM Duration: 24h    Daily EEG Visual Analysis  FINDINGS:  The background was continuous, spontaneously variable and reactive. During wakefulness, PDR is 8, with amplitude to 30 uV.  Low amplitude frontal beta was noted in wakefulness.    Background Slowing:  Background predominantly consisted of theta, delta, and faster activities.     Focal Slowing:   There is continuous polymorphic delta slowing in the left anterior temporal region.    Sleep Background:  Drowsiness was characterized by fragmentation, attenuation, and slowing of the background activity.    Sleep was characterized by the presence of vertex waves, symmetric sleep spindles and K-complexes.    Other Non-Epileptiform Findings:  None were present.  Breach effect in left anterior temporal region characterized by higher amplitude, sharply contoured waves and fast activities.    Interictal Epileptiform Activity:   There are frequent spikes from the left anterior temporal region (also region of breach artifact)    Events:  No events or seizures recorded.    Artifacts:  Intermittent myogenic and movement artifacts were noted.    ECG:  The heart rate on single channel ECG was predominantly between 60-70 BPM.    AEDs:  divalproex /1500 qHS  phenytoin suspension 300 qHS  lacosamide 200 q12 via GT  levetiracetam 1500 IV q12      EEG Summary:  Abnormal EEG in the awake, drowsy and asleep states.  spikes, focal, left anterior temporal region  continuous polymorphic delta, focal, left temporal region  Mild generalized slowing     Impression/Clinical Correlate:    There is evidence of a potentially epileptogenic foci and structural abnormality in the left anterior temporal region. There is also evidence of skull defect in the same area. There is mild diffuse cerebral dysfunction.     Note: This is a preliminary report pending attending review.    Kiko Drake MD  Epilepsy Fellow   Starting: Day 3      10/30/2020      Time: 8:00: AM Duration: 24h    Daily EEG Visual Analysis  FINDINGS:  The background was continuous, spontaneously variable and reactive. During wakefulness, PDR is 8, with amplitude to 30 uV.  Low amplitude frontal beta was noted in wakefulness.    Background Slowing:  Background predominantly consisted of theta, delta, and faster activities.     Focal Slowing:   There is continuous polymorphic delta slowing in the left anterior temporal region.    Sleep Background:  Drowsiness was characterized by fragmentation, attenuation, and slowing of the background activity.    Sleep was characterized by the presence of vertex waves, symmetric sleep spindles and K-complexes.    Other Non-Epileptiform Findings:  None were present.  Breach effect in left anterior temporal region characterized by higher amplitude, sharply contoured waves and fast activities.    Interictal Epileptiform Activity:   There are frequent spikes from the left anterior temporal region (also region of breach artifact)    Events:  No events or seizures recorded.    Artifacts:  Intermittent myogenic and movement artifacts were noted.    ECG:  The heart rate on single channel ECG was predominantly between 60-70 BPM.    AEDs:  divalproex /1500 qHS  phenytoin suspension 300 qHS  lacosamide 200 q12 via GT  levetiracetam 1500 IV q12      EEG Summary:  Abnormal EEG in the awake, drowsy and asleep states.  spikes, focal, left anterior temporal region  continuous polymorphic delta, focal, left temporal region  Mild generalized slowing     Impression/Clinical Correlate:    There is evidence of a potentially epileptogenic foci and structural abnormality in the left anterior temporal region. There is also evidence of skull defect in the same area. There is mild diffuse cerebral dysfunction.       Kiko Drake MD  Epilepsy Fellow    Marycarmen Amador MD  Attending Physician, [facility]

## 2020-11-01 VITALS
SYSTOLIC BLOOD PRESSURE: 172 MMHG | TEMPERATURE: 98 F | OXYGEN SATURATION: 98 % | HEART RATE: 79 BPM | RESPIRATION RATE: 19 BRPM | DIASTOLIC BLOOD PRESSURE: 94 MMHG

## 2020-11-01 PROCEDURE — 81001 URINALYSIS AUTO W/SCOPE: CPT

## 2020-11-01 PROCEDURE — 93005 ELECTROCARDIOGRAM TRACING: CPT | Mod: XU

## 2020-11-01 PROCEDURE — 95700 EEG CONT REC W/VID EEG TECH: CPT

## 2020-11-01 PROCEDURE — 85027 COMPLETE CBC AUTOMATED: CPT

## 2020-11-01 PROCEDURE — 84480 ASSAY TRIIODOTHYRONINE (T3): CPT

## 2020-11-01 PROCEDURE — 87086 URINE CULTURE/COLONY COUNT: CPT

## 2020-11-01 PROCEDURE — 95715 VEEG EA 12-26HR INTMT MNTR: CPT

## 2020-11-01 PROCEDURE — 84484 ASSAY OF TROPONIN QUANT: CPT

## 2020-11-01 PROCEDURE — 96374 THER/PROPH/DIAG INJ IV PUSH: CPT | Mod: XU

## 2020-11-01 PROCEDURE — C9254: CPT

## 2020-11-01 PROCEDURE — 85730 THROMBOPLASTIN TIME PARTIAL: CPT

## 2020-11-01 PROCEDURE — 80164 ASSAY DIPROPYLACETIC ACD TOT: CPT

## 2020-11-01 PROCEDURE — 87040 BLOOD CULTURE FOR BACTERIA: CPT

## 2020-11-01 PROCEDURE — 94002 VENT MGMT INPAT INIT DAY: CPT

## 2020-11-01 PROCEDURE — 84436 ASSAY OF TOTAL THYROXINE: CPT

## 2020-11-01 PROCEDURE — 99291 CRITICAL CARE FIRST HOUR: CPT | Mod: 25

## 2020-11-01 PROCEDURE — 80053 COMPREHEN METABOLIC PANEL: CPT

## 2020-11-01 PROCEDURE — 80185 ASSAY OF PHENYTOIN TOTAL: CPT

## 2020-11-01 PROCEDURE — 85025 COMPLETE CBC W/AUTO DIFF WBC: CPT

## 2020-11-01 PROCEDURE — 92526 ORAL FUNCTION THERAPY: CPT

## 2020-11-01 PROCEDURE — 12345: CPT | Mod: NC

## 2020-11-01 PROCEDURE — 51701 INSERT BLADDER CATHETER: CPT | Mod: XU

## 2020-11-01 PROCEDURE — 86769 SARS-COV-2 COVID-19 ANTIBODY: CPT

## 2020-11-01 PROCEDURE — 97166 OT EVAL MOD COMPLEX 45 MIN: CPT

## 2020-11-01 PROCEDURE — 80177 DRUG SCRN QUAN LEVETIRACETAM: CPT

## 2020-11-01 PROCEDURE — 80048 BASIC METABOLIC PNL TOTAL CA: CPT

## 2020-11-01 PROCEDURE — 97162 PT EVAL MOD COMPLEX 30 MIN: CPT

## 2020-11-01 PROCEDURE — 86900 BLOOD TYPING SEROLOGIC ABO: CPT

## 2020-11-01 PROCEDURE — 85610 PROTHROMBIN TIME: CPT

## 2020-11-01 PROCEDURE — U0003: CPT

## 2020-11-01 PROCEDURE — 96375 TX/PRO/DX INJ NEW DRUG ADDON: CPT | Mod: XU

## 2020-11-01 PROCEDURE — 86901 BLOOD TYPING SEROLOGIC RH(D): CPT

## 2020-11-01 PROCEDURE — 82962 GLUCOSE BLOOD TEST: CPT

## 2020-11-01 PROCEDURE — 84443 ASSAY THYROID STIM HORMONE: CPT

## 2020-11-01 PROCEDURE — 31500 INSERT EMERGENCY AIRWAY: CPT

## 2020-11-01 PROCEDURE — 86850 RBC ANTIBODY SCREEN: CPT

## 2020-11-01 PROCEDURE — 80307 DRUG TEST PRSMV CHEM ANLYZR: CPT

## 2020-11-01 PROCEDURE — 71045 X-RAY EXAM CHEST 1 VIEW: CPT

## 2020-11-01 RX ADMIN — RISPERIDONE 0.5 MILLIGRAM(S): 4 TABLET ORAL at 05:44

## 2020-11-01 RX ADMIN — LEVETIRACETAM 400 MILLIGRAM(S): 250 TABLET, FILM COATED ORAL at 05:43

## 2020-11-01 RX ADMIN — LOSARTAN POTASSIUM 50 MILLIGRAM(S): 100 TABLET, FILM COATED ORAL at 05:43

## 2020-11-02 ENCOUNTER — NON-APPOINTMENT (OUTPATIENT)
Age: 70
End: 2020-11-02

## 2020-11-02 PROBLEM — D18.01 HEMANGIOMA OF SKIN AND SUBCUTANEOUS TISSUE: Chronic | Status: ACTIVE | Noted: 2017-08-18

## 2020-11-02 PROBLEM — G40.209 LOCALIZATION-RELATED (FOCAL) (PARTIAL) SYMPTOMATIC EPILEPSY AND EPILEPTIC SYNDROMES WITH COMPLEX PARTIAL SEIZURES, NOT INTRACTABLE, WITHOUT STATUS EPILEPTICUS: Chronic | Status: ACTIVE | Noted: 2017-08-18

## 2020-11-02 LAB
CULTURE RESULTS: SIGNIFICANT CHANGE UP
CULTURE RESULTS: SIGNIFICANT CHANGE UP
SPECIMEN SOURCE: SIGNIFICANT CHANGE UP
SPECIMEN SOURCE: SIGNIFICANT CHANGE UP

## 2020-11-10 ENCOUNTER — APPOINTMENT (OUTPATIENT)
Dept: NEUROLOGY | Facility: CLINIC | Age: 70
End: 2020-11-10
Payer: MEDICARE

## 2020-11-10 VITALS
HEIGHT: 74 IN | DIASTOLIC BLOOD PRESSURE: 73 MMHG | SYSTOLIC BLOOD PRESSURE: 130 MMHG | HEART RATE: 60 BPM | BODY MASS INDEX: 24.77 KG/M2 | WEIGHT: 193 LBS

## 2020-11-10 VITALS — TEMPERATURE: 94.2 F

## 2020-11-10 PROCEDURE — 99215 OFFICE O/P EST HI 40 MIN: CPT

## 2020-11-10 NOTE — PHYSICAL EXAM
[FreeTextEntry1] : MS: alert & oriented to person, place time, good fund of knowledge and recall for recent and remote events. \par CN: VFF to confrontation, EOM full without nystagmus, PERRL, V1-V3 intact to light touch, face symmetrical, hears finger rub bilaterally, palate elevates symmetrically, shoulders elevate symmetrically, tongue midline\par MOTOR: normal tone x 4 extremities, Power 5/5 proximally and distally bilaterally, no drift, normal rapid alternating movements. \par SENSORY: intact LT x 4 extremities\par REFLEXES: biceps 2+ bilaterally, triceps 2+ bilaterally, brachioradialis 2+ bilaterally, patella 2+ bilaterally, ankle 2+ bilaterally, plantar flexor bilaterally\par COORD: normal FNF, no tremor or dysmetria\par GAIT: walks with a walker\par

## 2020-11-10 NOTE — HISTORY OF PRESENT ILLNESS
[FreeTextEntry1] : *** 11/10/2020 ***\par \par MAURO NUNEZ is here for hospital follow up and for transition of care for his epilepsy.\par he was recently admitted in the hospital and his hospital course is as follow:\par \par 70y R-handed M with h/o, HTN, HLD and L. 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 breakthrough\par seizure. Last seizure 2 years prior. Transferred from NSCU to EMU. Pt with\par great improvement.\par \par EEG Summary: 10/30/20\par Abnormal EEG in the awake, drowsy and asleep states.\par spikes, focal, left anterior temporal region\par continuous polymorphic delta, focal, left temporal region\par \par \par he had severe postictal psychosis and was extremely agitated for 2 days.\par He is stable now and he did not have any seizures, but he feels depressed.\par \par he had tried several AEDs in the past and developed SE or they were not covered by his insurance.\par \par he failed Keppra low dose, Depakote, Dilantin, zonisamide, Tegretol, Vimpat, Aptiom.\par \par his levels in the hospital were subtherapeutic likely due to drug-drug interaction\par \par \par \par \par

## 2020-11-10 NOTE — ASSESSMENT
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 decrease keppra to 100 bid\par 2. Continue -100\par 3.Continue Dilantin 300 daily\par 4. Rx for xcopri sent and if approved I will transition him from Dilantin to Xcopri and likely keppra will be decreased as well.\par blood work now an d f/u in 2 weeks\par

## 2020-11-11 ENCOUNTER — APPOINTMENT (OUTPATIENT)
Dept: INTERNAL MEDICINE | Facility: CLINIC | Age: 70
End: 2020-11-11

## 2020-11-11 ENCOUNTER — APPOINTMENT (OUTPATIENT)
Dept: INTERNAL MEDICINE | Facility: CLINIC | Age: 70
End: 2020-11-11
Payer: MEDICARE

## 2020-11-11 PROCEDURE — ZZZZZ: CPT

## 2020-11-12 ENCOUNTER — TRANSCRIPTION ENCOUNTER (OUTPATIENT)
Age: 70
End: 2020-11-12

## 2020-11-12 LAB
ALBUMIN SERPL ELPH-MCNC: 3.9 G/DL
ALP BLD-CCNC: 56 U/L
ALT SERPL-CCNC: 12 U/L
ANION GAP SERPL CALC-SCNC: 15 MMOL/L
AST SERPL-CCNC: 15 U/L
BASOPHILS # BLD AUTO: 0.03 K/UL
BASOPHILS NFR BLD AUTO: 0.4 %
BILIRUB SERPL-MCNC: 0.4 MG/DL
BUN SERPL-MCNC: 13 MG/DL
CALCIUM SERPL-MCNC: 8.9 MG/DL
CHLORIDE SERPL-SCNC: 100 MMOL/L
CHOLEST SERPL-MCNC: 178 MG/DL
CO2 SERPL-SCNC: 26 MMOL/L
CREAT SERPL-MCNC: 0.8 MG/DL
EOSINOPHIL # BLD AUTO: 0.01 K/UL
EOSINOPHIL NFR BLD AUTO: 0.1 %
ESTIMATED AVERAGE GLUCOSE: 108 MG/DL
GLUCOSE SERPL-MCNC: 97 MG/DL
HBA1C MFR BLD HPLC: 5.4 %
HCT VFR BLD CALC: 38.6 %
HDLC SERPL-MCNC: 63 MG/DL
HGB BLD-MCNC: 13 G/DL
IMM GRANULOCYTES NFR BLD AUTO: 0.3 %
LDLC SERPL CALC-MCNC: 99 MG/DL
LDLC SERPL DIRECT ASSAY-MCNC: 97 MG/DL
LYMPHOCYTES # BLD AUTO: 3.1 K/UL
LYMPHOCYTES NFR BLD AUTO: 43.1 %
MAN DIFF?: NORMAL
MCHC RBC-ENTMCNC: 31 PG
MCHC RBC-ENTMCNC: 33.7 GM/DL
MCV RBC AUTO: 92.1 FL
MONOCYTES # BLD AUTO: 0.57 K/UL
MONOCYTES NFR BLD AUTO: 7.9 %
NEUTROPHILS # BLD AUTO: 3.47 K/UL
NEUTROPHILS NFR BLD AUTO: 48.2 %
NONHDLC SERPL-MCNC: 114 MG/DL
PLATELET # BLD AUTO: 227 K/UL
POTASSIUM SERPL-SCNC: 4.1 MMOL/L
PROT SERPL-MCNC: 6.3 G/DL
PSA SERPL-MCNC: 5.07 NG/ML
RBC # BLD: 4.19 M/UL
RBC # FLD: 14.4 %
SARS-COV-2 IGG SERPL IA-ACNC: 0.1 INDEX
SARS-COV-2 IGG SERPL QL IA: NEGATIVE
SAVE SPECIMEN: NORMAL
SODIUM SERPL-SCNC: 141 MMOL/L
TRIGL SERPL-MCNC: 78 MG/DL
WBC # FLD AUTO: 7.2 K/UL

## 2020-11-12 RX ORDER — MIDAZOLAM 5 MG/.1ML
5 SPRAY NASAL
Qty: 1 | Refills: 0 | Status: ACTIVE | COMMUNITY
Start: 2020-11-10 | End: 1900-01-01

## 2020-11-13 ENCOUNTER — TRANSCRIPTION ENCOUNTER (OUTPATIENT)
Age: 70
End: 2020-11-13

## 2020-11-18 ENCOUNTER — APPOINTMENT (OUTPATIENT)
Dept: INTERNAL MEDICINE | Facility: CLINIC | Age: 70
End: 2020-11-18
Payer: MEDICARE

## 2020-11-18 VITALS
DIASTOLIC BLOOD PRESSURE: 70 MMHG | WEIGHT: 195 LBS | BODY MASS INDEX: 25.03 KG/M2 | HEIGHT: 74 IN | SYSTOLIC BLOOD PRESSURE: 140 MMHG

## 2020-11-18 PROCEDURE — 99214 OFFICE O/P EST MOD 30 MIN: CPT

## 2020-11-18 NOTE — HISTORY OF PRESENT ILLNESS
[FreeTextEntry1] : sz, HTN, hypercholesterol, high glucose, and elevated PSA [de-identified] : No new complaints.\par

## 2020-11-18 NOTE — ASSESSMENT
[FreeTextEntry1] : Continue same blood pressure medication.  Follow blood pressure.\par Continue same cholesterol medication.  Follow lipid.\par follow A1C\par follow CBC\par will see  in 2 wks\par sz-stable.  cont same meds (per neuro)\par stable wt

## 2020-11-18 NOTE — PHYSICAL EXAM
[No Acute Distress] : no acute distress [Well Nourished] : well nourished [Well Developed] : well developed [Well-Appearing] : well-appearing [Normal Outer Ear/Nose] : the outer ears and nose were normal in appearance [Supple] : supple [No Respiratory Distress] : no respiratory distress  [No Accessory Muscle Use] : no accessory muscle use [Clear to Auscultation] : lungs were clear to auscultation bilaterally [Normal Rate] : normal rate  [Regular Rhythm] : with a regular rhythm [No Edema] : there was no peripheral edema [Soft] : abdomen soft [No CVA Tenderness] : no CVA  tenderness [No Spinal Tenderness] : no spinal tenderness [Cyanosis] : no cyanosis [Abnormal Temperature] : normal temperature [Speech Grossly Normal] : speech grossly normal [Normal Affect] : the affect was normal [Normal Mood] : the mood was normal

## 2020-12-02 ENCOUNTER — APPOINTMENT (OUTPATIENT)
Dept: NEUROLOGY | Facility: CLINIC | Age: 70
End: 2020-12-02
Payer: MEDICARE

## 2020-12-02 VITALS — TEMPERATURE: 97.3 F

## 2020-12-02 VITALS
WEIGHT: 195 LBS | HEART RATE: 58 BPM | SYSTOLIC BLOOD PRESSURE: 143 MMHG | HEIGHT: 74 IN | BODY MASS INDEX: 25.03 KG/M2 | DIASTOLIC BLOOD PRESSURE: 66 MMHG

## 2020-12-02 PROCEDURE — 99214 OFFICE O/P EST MOD 30 MIN: CPT

## 2020-12-03 ENCOUNTER — TRANSCRIPTION ENCOUNTER (OUTPATIENT)
Age: 70
End: 2020-12-03

## 2020-12-13 ENCOUNTER — TRANSCRIPTION ENCOUNTER (OUTPATIENT)
Age: 70
End: 2020-12-13

## 2020-12-13 NOTE — HISTORY OF PRESENT ILLNESS
[FreeTextEntry1] : *** 12/02/2020 *** \par \par MAURO NUNEZ is here for follow up.\par no seizures since dc\par continue to have depressed mood( medication SE?)\par \par Xcopri was approved but did not started as of yet due to high copay for increased dose ( at )\par \par \par *** 11/10/2020 ***\par \par MAURO NUNEZ is here for hospital follow up and for transition of care for his epilepsy.\par he was recently admitted in the hospital and his hospital course is as follow:\par \par 70y R-handed M with h/o, HTN, HLD and L. 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 breakthrough\par seizure. Last seizure 2 years prior. Transferred from NSCU to EMU. Pt with\par great improvement.\par \par EEG Summary: 10/30/20\par Abnormal EEG in the awake, drowsy and asleep states.\par spikes, focal, left anterior temporal region\par continuous polymorphic delta, focal, left temporal region\par \par \par he had severe postictal psychosis and was extremely agitated for 2 days.\par He is stable now and he did not have any seizures, but he feels depressed.\par \par he had tried several AEDs in the past and developed SE or they were not covered by his insurance.\par \par he failed Keppra low dose, Depakote, Dilantin, zonisamide, Tegretol, Vimpat, Aptiom.\par \par his levels in the hospital were subtherapeutic likely due to drug-drug interaction\par \par \par \par \par

## 2020-12-13 NOTE — ASSESSMENT
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 decrease keppra to 750 bid\par 2. Continue -1000\par 3.Continue Dilantin 300 daily\par 4. they will decide further if they can afford Xcopri and in that situation Dilantin will be dc and replaced with Xcopri\par blood work now an d f/u in 2 weeks\par

## 2020-12-23 ENCOUNTER — TRANSCRIPTION ENCOUNTER (OUTPATIENT)
Age: 70
End: 2020-12-23

## 2020-12-28 ENCOUNTER — TRANSCRIPTION ENCOUNTER (OUTPATIENT)
Age: 70
End: 2020-12-28

## 2020-12-31 ENCOUNTER — TRANSCRIPTION ENCOUNTER (OUTPATIENT)
Age: 70
End: 2020-12-31

## 2021-01-27 ENCOUNTER — APPOINTMENT (OUTPATIENT)
Dept: NEUROLOGY | Facility: CLINIC | Age: 71
End: 2021-01-27
Payer: MEDICARE

## 2021-01-27 VITALS
HEIGHT: 74 IN | HEART RATE: 67 BPM | SYSTOLIC BLOOD PRESSURE: 150 MMHG | DIASTOLIC BLOOD PRESSURE: 75 MMHG | WEIGHT: 205 LBS | BODY MASS INDEX: 26.31 KG/M2

## 2021-01-27 PROCEDURE — 99214 OFFICE O/P EST MOD 30 MIN: CPT

## 2021-02-01 NOTE — HISTORY OF PRESENT ILLNESS
[FreeTextEntry1] : *** 01/27/2021 *** \par \par MAURO NUNEZ is seen for follow up. continue to be depressed but no seizures. Tolerated well Xcopri\par \par *** 12/02/2020 *** \par \par MAURO NUNEZ is here for follow up.\par no seizures since dc\par continue to have depressed mood( medication SE?)\par \par Xcopri was approved but did not started as of yet due to high copay for increased dose ( at )\par \par \par *** 11/10/2020 ***\par \par MAURO NUNEZ is here for hospital follow up and for transition of care for his epilepsy.\par he was recently admitted in the hospital and his hospital course is as follow:\par \par 70y R-handed M with h/o, HTN, HLD and L. 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 breakthrough\par seizure. Last seizure 2 years prior. Transferred from NSCU to EMU. Pt with\par great improvement.\par \par EEG Summary: 10/30/20\par Abnormal EEG in the awake, drowsy and asleep states.\par spikes, focal, left anterior temporal region\par continuous polymorphic delta, focal, left temporal region\par \par \par he had severe postictal psychosis and was extremely agitated for 2 days.\par He is stable now and he did not have any seizures, but he feels depressed.\par \par he had tried several AEDs in the past and developed SE or they were not covered by his insurance.\par \par he failed Keppra low dose, Depakote, Dilantin, zonisamide, Tegretol, Vimpat, Aptiom.\par \par his levels in the hospital were subtherapeutic likely due to drug-drug interaction\par \par \par \par \par

## 2021-02-01 NOTE — ASSESSMENT
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 decrease keppra to 750 bid\par 2. Continue -1000\par 3.decrease Dilantin to 200 daily and in 2 weeks 100 daily\par 4. continue xcopri 150-200\par 5. f/u in 3 weeks, blood work then\par

## 2021-02-02 ENCOUNTER — TRANSCRIPTION ENCOUNTER (OUTPATIENT)
Age: 71
End: 2021-02-02

## 2021-02-02 RX ORDER — CENOBAMATE 150-200 MG
14 X 150 MG & KIT ORAL
Qty: 28 | Refills: 0 | Status: DISCONTINUED | COMMUNITY
Start: 2020-11-10 | End: 2021-02-02

## 2021-02-02 RX ORDER — CENOBAMATE 50MG-100MG
14 X 50 MG & KIT ORAL
Qty: 28 | Refills: 0 | Status: DISCONTINUED | COMMUNITY
Start: 2020-10-29 | End: 2021-02-02

## 2021-02-03 ENCOUNTER — TRANSCRIPTION ENCOUNTER (OUTPATIENT)
Age: 71
End: 2021-02-03

## 2021-02-04 ENCOUNTER — TRANSCRIPTION ENCOUNTER (OUTPATIENT)
Age: 71
End: 2021-02-04

## 2021-02-09 ENCOUNTER — TRANSCRIPTION ENCOUNTER (OUTPATIENT)
Age: 71
End: 2021-02-09

## 2021-03-04 ENCOUNTER — INPATIENT (INPATIENT)
Facility: HOSPITAL | Age: 71
LOS: 1 days | Discharge: HOME | DRG: 100 | End: 2021-03-06
Attending: PSYCHIATRY & NEUROLOGY | Admitting: PSYCHIATRY & NEUROLOGY
Payer: MEDICARE

## 2021-03-04 ENCOUNTER — NON-APPOINTMENT (OUTPATIENT)
Age: 71
End: 2021-03-04

## 2021-03-04 VITALS — HEIGHT: 74 IN

## 2021-03-04 DIAGNOSIS — G40.901 EPILEPSY, UNSPECIFIED, NOT INTRACTABLE, WITH STATUS EPILEPTICUS: ICD-10-CM

## 2021-03-04 DIAGNOSIS — Z98.890 OTHER SPECIFIED POSTPROCEDURAL STATES: Chronic | ICD-10-CM

## 2021-03-04 LAB
ALBUMIN SERPL ELPH-MCNC: 4.1 G/DL — SIGNIFICANT CHANGE UP (ref 3.3–5)
ALP SERPL-CCNC: 77 U/L — SIGNIFICANT CHANGE UP (ref 40–120)
ALT FLD-CCNC: 15 U/L — SIGNIFICANT CHANGE UP (ref 10–45)
AMMONIA BLD-MCNC: 40 UMOL/L — SIGNIFICANT CHANGE UP (ref 11–55)
ANION GAP SERPL CALC-SCNC: 12 MMOL/L — SIGNIFICANT CHANGE UP (ref 5–17)
ANION GAP SERPL CALC-SCNC: 15 MMOL/L — SIGNIFICANT CHANGE UP (ref 5–17)
APPEARANCE UR: CLEAR — SIGNIFICANT CHANGE UP
APTT BLD: 30 SEC — SIGNIFICANT CHANGE UP (ref 27.5–35.5)
AST SERPL-CCNC: 20 U/L — SIGNIFICANT CHANGE UP (ref 10–40)
BACTERIA # UR AUTO: NEGATIVE — SIGNIFICANT CHANGE UP
BASE EXCESS BLDA CALC-SCNC: -0.1 MMOL/L — SIGNIFICANT CHANGE UP (ref -2–2)
BASOPHILS # BLD AUTO: 0.04 K/UL — SIGNIFICANT CHANGE UP (ref 0–0.2)
BASOPHILS NFR BLD AUTO: 0.3 % — SIGNIFICANT CHANGE UP (ref 0–2)
BILIRUB SERPL-MCNC: 0.2 MG/DL — SIGNIFICANT CHANGE UP (ref 0.2–1.2)
BILIRUB UR-MCNC: NEGATIVE — SIGNIFICANT CHANGE UP
BUN SERPL-MCNC: 10 MG/DL — SIGNIFICANT CHANGE UP (ref 7–23)
BUN SERPL-MCNC: 8 MG/DL — SIGNIFICANT CHANGE UP (ref 7–23)
CALCIUM SERPL-MCNC: 8.3 MG/DL — LOW (ref 8.4–10.5)
CALCIUM SERPL-MCNC: 8.6 MG/DL — SIGNIFICANT CHANGE UP (ref 8.4–10.5)
CHLORIDE SERPL-SCNC: 101 MMOL/L — SIGNIFICANT CHANGE UP (ref 96–108)
CHLORIDE SERPL-SCNC: 105 MMOL/L — SIGNIFICANT CHANGE UP (ref 96–108)
CO2 BLDA-SCNC: 29 MMOL/L — SIGNIFICANT CHANGE UP (ref 22–30)
CO2 SERPL-SCNC: 21 MMOL/L — LOW (ref 22–31)
CO2 SERPL-SCNC: 24 MMOL/L — SIGNIFICANT CHANGE UP (ref 22–31)
COLOR SPEC: SIGNIFICANT CHANGE UP
CREAT SERPL-MCNC: 0.77 MG/DL — SIGNIFICANT CHANGE UP (ref 0.5–1.3)
CREAT SERPL-MCNC: 0.96 MG/DL — SIGNIFICANT CHANGE UP (ref 0.5–1.3)
DIFF PNL FLD: ABNORMAL
EOSINOPHIL # BLD AUTO: 0.02 K/UL — SIGNIFICANT CHANGE UP (ref 0–0.5)
EOSINOPHIL NFR BLD AUTO: 0.2 % — SIGNIFICANT CHANGE UP (ref 0–6)
EPI CELLS # UR: 0 /HPF — SIGNIFICANT CHANGE UP
GAS PNL BLDA: SIGNIFICANT CHANGE UP
GLUCOSE SERPL-MCNC: 118 MG/DL — HIGH (ref 70–99)
GLUCOSE SERPL-MCNC: 168 MG/DL — HIGH (ref 70–99)
GLUCOSE UR QL: NEGATIVE — SIGNIFICANT CHANGE UP
HCO3 BLDA-SCNC: 28 MMOL/L — SIGNIFICANT CHANGE UP (ref 21–29)
HCT VFR BLD CALC: 39.1 % — SIGNIFICANT CHANGE UP (ref 39–50)
HCT VFR BLD CALC: 43.1 % — SIGNIFICANT CHANGE UP (ref 39–50)
HGB BLD-MCNC: 13 G/DL — SIGNIFICANT CHANGE UP (ref 13–17)
HGB BLD-MCNC: 14.5 G/DL — SIGNIFICANT CHANGE UP (ref 13–17)
IMM GRANULOCYTES NFR BLD AUTO: 0.4 % — SIGNIFICANT CHANGE UP (ref 0–1.5)
INR BLD: 0.97 RATIO — SIGNIFICANT CHANGE UP (ref 0.88–1.16)
KETONES UR-MCNC: NEGATIVE — SIGNIFICANT CHANGE UP
LEUKOCYTE ESTERASE UR-ACNC: NEGATIVE — SIGNIFICANT CHANGE UP
LYMPHOCYTES # BLD AUTO: 1.84 K/UL — SIGNIFICANT CHANGE UP (ref 1–3.3)
LYMPHOCYTES # BLD AUTO: 15.4 % — SIGNIFICANT CHANGE UP (ref 13–44)
MAGNESIUM SERPL-MCNC: 1.6 MG/DL — SIGNIFICANT CHANGE UP (ref 1.6–2.6)
MCHC RBC-ENTMCNC: 30.8 PG — SIGNIFICANT CHANGE UP (ref 27–34)
MCHC RBC-ENTMCNC: 31.1 PG — SIGNIFICANT CHANGE UP (ref 27–34)
MCHC RBC-ENTMCNC: 33.2 GM/DL — SIGNIFICANT CHANGE UP (ref 32–36)
MCHC RBC-ENTMCNC: 33.6 GM/DL — SIGNIFICANT CHANGE UP (ref 32–36)
MCV RBC AUTO: 92.5 FL — SIGNIFICANT CHANGE UP (ref 80–100)
MCV RBC AUTO: 92.7 FL — SIGNIFICANT CHANGE UP (ref 80–100)
MONOCYTES # BLD AUTO: 0.69 K/UL — SIGNIFICANT CHANGE UP (ref 0–0.9)
MONOCYTES NFR BLD AUTO: 5.8 % — SIGNIFICANT CHANGE UP (ref 2–14)
NEUTROPHILS # BLD AUTO: 9.28 K/UL — HIGH (ref 1.8–7.4)
NEUTROPHILS NFR BLD AUTO: 77.9 % — HIGH (ref 43–77)
NITRITE UR-MCNC: NEGATIVE — SIGNIFICANT CHANGE UP
NRBC # BLD: 0 /100 WBCS — SIGNIFICANT CHANGE UP (ref 0–0)
NRBC # BLD: 0 /100 WBCS — SIGNIFICANT CHANGE UP (ref 0–0)
PCO2 BLDA: 59 MMHG — HIGH (ref 32–46)
PH BLDA: 7.29 — LOW (ref 7.35–7.45)
PH UR: 6 — SIGNIFICANT CHANGE UP (ref 5–8)
PHENYTOIN FREE SERPL-MCNC: 1 UG/ML — LOW (ref 10–20)
PHENYTOIN FREE SERPL-MCNC: 1.2 UG/ML — LOW (ref 10–20)
PHOSPHATE SERPL-MCNC: 3.4 MG/DL — SIGNIFICANT CHANGE UP (ref 2.5–4.5)
PLATELET # BLD AUTO: 154 K/UL — SIGNIFICANT CHANGE UP (ref 150–400)
PLATELET # BLD AUTO: 190 K/UL — SIGNIFICANT CHANGE UP (ref 150–400)
PO2 BLDA: 144 MMHG — HIGH (ref 74–108)
POTASSIUM SERPL-MCNC: 3.6 MMOL/L — SIGNIFICANT CHANGE UP (ref 3.5–5.3)
POTASSIUM SERPL-MCNC: 4.3 MMOL/L — SIGNIFICANT CHANGE UP (ref 3.5–5.3)
POTASSIUM SERPL-SCNC: 3.6 MMOL/L — SIGNIFICANT CHANGE UP (ref 3.5–5.3)
POTASSIUM SERPL-SCNC: 4.3 MMOL/L — SIGNIFICANT CHANGE UP (ref 3.5–5.3)
PROT SERPL-MCNC: 6.7 G/DL — SIGNIFICANT CHANGE UP (ref 6–8.3)
PROT UR-MCNC: ABNORMAL
PROTHROM AB SERPL-ACNC: 11.6 SEC — SIGNIFICANT CHANGE UP (ref 10.6–13.6)
RBC # BLD: 4.22 M/UL — SIGNIFICANT CHANGE UP (ref 4.2–5.8)
RBC # BLD: 4.66 M/UL — SIGNIFICANT CHANGE UP (ref 4.2–5.8)
RBC # FLD: 13.1 % — SIGNIFICANT CHANGE UP (ref 10.3–14.5)
RBC # FLD: 13.2 % — SIGNIFICANT CHANGE UP (ref 10.3–14.5)
RBC CASTS # UR COMP ASSIST: 7 /HPF — HIGH (ref 0–4)
SAO2 % BLDA: 99 % — HIGH (ref 92–96)
SARS-COV-2 RNA SPEC QL NAA+PROBE: SIGNIFICANT CHANGE UP
SODIUM SERPL-SCNC: 137 MMOL/L — SIGNIFICANT CHANGE UP (ref 135–145)
SODIUM SERPL-SCNC: 141 MMOL/L — SIGNIFICANT CHANGE UP (ref 135–145)
SP GR SPEC: 1.01 — SIGNIFICANT CHANGE UP (ref 1.01–1.02)
TROPONIN T, HIGH SENSITIVITY RESULT: 20 NG/L — SIGNIFICANT CHANGE UP (ref 0–51)
UROBILINOGEN FLD QL: NEGATIVE — SIGNIFICANT CHANGE UP
VALPROATE SERPL-MCNC: 38 UG/ML — LOW (ref 50–100)
VALPROATE SERPL-MCNC: 56 UG/ML — SIGNIFICANT CHANGE UP (ref 50–100)
VALPROATE SERPL-MCNC: 64 UG/ML — SIGNIFICANT CHANGE UP (ref 50–100)
WBC # BLD: 11.92 K/UL — HIGH (ref 3.8–10.5)
WBC # BLD: 9.87 K/UL — SIGNIFICANT CHANGE UP (ref 3.8–10.5)
WBC # FLD AUTO: 11.92 K/UL — HIGH (ref 3.8–10.5)
WBC # FLD AUTO: 9.87 K/UL — SIGNIFICANT CHANGE UP (ref 3.8–10.5)
WBC UR QL: 0 /HPF — SIGNIFICANT CHANGE UP (ref 0–5)

## 2021-03-04 PROCEDURE — 99291 CRITICAL CARE FIRST HOUR: CPT

## 2021-03-04 PROCEDURE — 93970 EXTREMITY STUDY: CPT | Mod: 26

## 2021-03-04 PROCEDURE — 95720 EEG PHY/QHP EA INCR W/VEEG: CPT

## 2021-03-04 PROCEDURE — 72170 X-RAY EXAM OF PELVIS: CPT | Mod: 26

## 2021-03-04 PROCEDURE — 72125 CT NECK SPINE W/O DYE: CPT | Mod: 26,MH

## 2021-03-04 PROCEDURE — 70450 CT HEAD/BRAIN W/O DYE: CPT | Mod: 26,MH

## 2021-03-04 PROCEDURE — 99223 1ST HOSP IP/OBS HIGH 75: CPT

## 2021-03-04 PROCEDURE — 99292 CRITICAL CARE ADDL 30 MIN: CPT

## 2021-03-04 PROCEDURE — 93010 ELECTROCARDIOGRAM REPORT: CPT

## 2021-03-04 PROCEDURE — 99291 CRITICAL CARE FIRST HOUR: CPT | Mod: CS,GC

## 2021-03-04 PROCEDURE — 71045 X-RAY EXAM CHEST 1 VIEW: CPT | Mod: 26,76

## 2021-03-04 RX ORDER — DIVALPROEX SODIUM 500 MG/1
1000 TABLET, DELAYED RELEASE ORAL
Refills: 0 | Status: DISCONTINUED | OUTPATIENT
Start: 2021-03-04 | End: 2021-03-04

## 2021-03-04 RX ORDER — SODIUM CHLORIDE 9 MG/ML
1000 INJECTION INTRAMUSCULAR; INTRAVENOUS; SUBCUTANEOUS
Refills: 0 | Status: DISCONTINUED | OUTPATIENT
Start: 2021-03-04 | End: 2021-03-04

## 2021-03-04 RX ORDER — VALPROIC ACID (AS SODIUM SALT) 250 MG/5ML
500 SOLUTION, ORAL ORAL EVERY 8 HOURS
Refills: 0 | Status: DISCONTINUED | OUTPATIENT
Start: 2021-03-04 | End: 2021-03-04

## 2021-03-04 RX ORDER — ENOXAPARIN SODIUM 100 MG/ML
40 INJECTION SUBCUTANEOUS
Refills: 0 | Status: DISCONTINUED | OUTPATIENT
Start: 2021-03-04 | End: 2021-03-06

## 2021-03-04 RX ORDER — FENTANYL CITRATE 50 UG/ML
25 INJECTION INTRAVENOUS ONCE
Refills: 0 | Status: DISCONTINUED | OUTPATIENT
Start: 2021-03-04 | End: 2021-03-04

## 2021-03-04 RX ORDER — ETOMIDATE 2 MG/ML
20 INJECTION INTRAVENOUS ONCE
Refills: 0 | Status: COMPLETED | OUTPATIENT
Start: 2021-03-04 | End: 2021-03-04

## 2021-03-04 RX ORDER — POLYETHYLENE GLYCOL 3350 17 G/17G
17 POWDER, FOR SOLUTION ORAL
Refills: 0 | Status: DISCONTINUED | OUTPATIENT
Start: 2021-03-04 | End: 2021-03-06

## 2021-03-04 RX ORDER — SUCCINYLCHOLINE CHLORIDE 100 MG/5ML
100 SYRINGE (ML) INTRAVENOUS ONCE
Refills: 0 | Status: COMPLETED | OUTPATIENT
Start: 2021-03-04 | End: 2021-03-04

## 2021-03-04 RX ORDER — MAGNESIUM SULFATE 500 MG/ML
2 VIAL (ML) INJECTION ONCE
Refills: 0 | Status: COMPLETED | OUTPATIENT
Start: 2021-03-04 | End: 2021-03-04

## 2021-03-04 RX ORDER — SENNA PLUS 8.6 MG/1
10 TABLET ORAL AT BEDTIME
Refills: 0 | Status: DISCONTINUED | OUTPATIENT
Start: 2021-03-04 | End: 2021-03-06

## 2021-03-04 RX ORDER — DIVALPROEX SODIUM 500 MG/1
500 TABLET, DELAYED RELEASE ORAL
Refills: 0 | Status: DISCONTINUED | OUTPATIENT
Start: 2021-03-04 | End: 2021-03-04

## 2021-03-04 RX ORDER — PROPOFOL 10 MG/ML
20 INJECTION, EMULSION INTRAVENOUS
Qty: 500 | Refills: 0 | Status: DISCONTINUED | OUTPATIENT
Start: 2021-03-04 | End: 2021-03-04

## 2021-03-04 RX ORDER — CHLORHEXIDINE GLUCONATE 213 G/1000ML
1 SOLUTION TOPICAL
Refills: 0 | Status: DISCONTINUED | OUTPATIENT
Start: 2021-03-04 | End: 2021-03-05

## 2021-03-04 RX ORDER — DEXMEDETOMIDINE HYDROCHLORIDE IN 0.9% SODIUM CHLORIDE 4 UG/ML
0.5 INJECTION INTRAVENOUS
Qty: 200 | Refills: 0 | Status: DISCONTINUED | OUTPATIENT
Start: 2021-03-04 | End: 2021-03-04

## 2021-03-04 RX ORDER — CHLORHEXIDINE GLUCONATE 213 G/1000ML
15 SOLUTION TOPICAL EVERY 12 HOURS
Refills: 0 | Status: DISCONTINUED | OUTPATIENT
Start: 2021-03-04 | End: 2021-03-04

## 2021-03-04 RX ORDER — SODIUM CHLORIDE 9 MG/ML
1000 INJECTION INTRAMUSCULAR; INTRAVENOUS; SUBCUTANEOUS
Refills: 0 | Status: DISCONTINUED | OUTPATIENT
Start: 2021-03-04 | End: 2021-03-05

## 2021-03-04 RX ORDER — FOSPHENYTOIN 50 MG/ML
100 INJECTION INTRAMUSCULAR; INTRAVENOUS
Refills: 0 | Status: DISCONTINUED | OUTPATIENT
Start: 2021-03-04 | End: 2021-03-04

## 2021-03-04 RX ORDER — LEVETIRACETAM 250 MG/1
1000 TABLET, FILM COATED ORAL
Refills: 0 | Status: DISCONTINUED | OUTPATIENT
Start: 2021-03-04 | End: 2021-03-06

## 2021-03-04 RX ORDER — LEVETIRACETAM 250 MG/1
1000 TABLET, FILM COATED ORAL ONCE
Refills: 0 | Status: COMPLETED | OUTPATIENT
Start: 2021-03-04 | End: 2021-03-04

## 2021-03-04 RX ORDER — VALPROIC ACID (AS SODIUM SALT) 250 MG/5ML
500 SOLUTION, ORAL ORAL EVERY 8 HOURS
Refills: 0 | Status: DISCONTINUED | OUTPATIENT
Start: 2021-03-04 | End: 2021-03-06

## 2021-03-04 RX ORDER — VALPROIC ACID (AS SODIUM SALT) 250 MG/5ML
1000 SOLUTION, ORAL ORAL ONCE
Refills: 0 | Status: COMPLETED | OUTPATIENT
Start: 2021-03-04 | End: 2021-03-04

## 2021-03-04 RX ORDER — FOSPHENYTOIN 50 MG/ML
1500 INJECTION INTRAMUSCULAR; INTRAVENOUS ONCE
Refills: 0 | Status: COMPLETED | OUTPATIENT
Start: 2021-03-04 | End: 2021-03-04

## 2021-03-04 RX ORDER — PANTOPRAZOLE SODIUM 20 MG/1
40 TABLET, DELAYED RELEASE ORAL DAILY
Refills: 0 | Status: DISCONTINUED | OUTPATIENT
Start: 2021-03-04 | End: 2021-03-05

## 2021-03-04 RX ORDER — LEVETIRACETAM 250 MG/1
750 TABLET, FILM COATED ORAL EVERY 12 HOURS
Refills: 0 | Status: DISCONTINUED | OUTPATIENT
Start: 2021-03-04 | End: 2021-03-04

## 2021-03-04 RX ORDER — MAGNESIUM SULFATE 500 MG/ML
1 VIAL (ML) INJECTION ONCE
Refills: 0 | Status: DISCONTINUED | OUTPATIENT
Start: 2021-03-04 | End: 2021-03-05

## 2021-03-04 RX ORDER — RISPERIDONE 4 MG/1
0.5 TABLET ORAL EVERY 12 HOURS
Refills: 0 | Status: DISCONTINUED | OUTPATIENT
Start: 2021-03-04 | End: 2021-03-04

## 2021-03-04 RX ADMIN — PROPOFOL 13.2 MICROGRAM(S)/KG/MIN: 10 INJECTION, EMULSION INTRAVENOUS at 05:25

## 2021-03-04 RX ADMIN — Medication 2 MILLIGRAM(S): at 05:00

## 2021-03-04 RX ADMIN — LEVETIRACETAM 400 MILLIGRAM(S): 250 TABLET, FILM COATED ORAL at 11:27

## 2021-03-04 RX ADMIN — PANTOPRAZOLE SODIUM 40 MILLIGRAM(S): 20 TABLET, DELAYED RELEASE ORAL at 11:28

## 2021-03-04 RX ADMIN — FENTANYL CITRATE 25 MICROGRAM(S): 50 INJECTION INTRAVENOUS at 18:45

## 2021-03-04 RX ADMIN — Medication 25 MILLIGRAM(S): at 11:49

## 2021-03-04 RX ADMIN — POLYETHYLENE GLYCOL 3350 17 GRAM(S): 17 POWDER, FOR SOLUTION ORAL at 17:19

## 2021-03-04 RX ADMIN — SODIUM CHLORIDE 125 MILLILITER(S): 9 INJECTION INTRAMUSCULAR; INTRAVENOUS; SUBCUTANEOUS at 05:40

## 2021-03-04 RX ADMIN — ENOXAPARIN SODIUM 40 MILLIGRAM(S): 100 INJECTION SUBCUTANEOUS at 17:19

## 2021-03-04 RX ADMIN — Medication 2 MILLIGRAM(S): at 04:55

## 2021-03-04 RX ADMIN — Medication 100 MILLIGRAM(S): at 05:08

## 2021-03-04 RX ADMIN — RISPERIDONE 0.5 MILLIGRAM(S): 4 TABLET ORAL at 17:19

## 2021-03-04 RX ADMIN — LEVETIRACETAM 400 MILLIGRAM(S): 250 TABLET, FILM COATED ORAL at 17:20

## 2021-03-04 RX ADMIN — Medication 2 MILLIGRAM(S): at 05:04

## 2021-03-04 RX ADMIN — Medication 27.5 MILLIGRAM(S): at 21:42

## 2021-03-04 RX ADMIN — SENNA PLUS 10 MILLILITER(S): 8.6 TABLET ORAL at 21:42

## 2021-03-04 RX ADMIN — FENTANYL CITRATE 25 MICROGRAM(S): 50 INJECTION INTRAVENOUS at 19:00

## 2021-03-04 RX ADMIN — Medication 50 GRAM(S): at 23:18

## 2021-03-04 RX ADMIN — Medication 27.5 MILLIGRAM(S): at 15:08

## 2021-03-04 RX ADMIN — CHLORHEXIDINE GLUCONATE 15 MILLILITER(S): 213 SOLUTION TOPICAL at 17:19

## 2021-03-04 RX ADMIN — DEXMEDETOMIDINE HYDROCHLORIDE IN 0.9% SODIUM CHLORIDE 13.8 MICROGRAM(S)/KG/HR: 4 INJECTION INTRAVENOUS at 20:00

## 2021-03-04 RX ADMIN — ETOMIDATE 20 MILLIGRAM(S): 2 INJECTION INTRAVENOUS at 05:07

## 2021-03-04 RX ADMIN — CHLORHEXIDINE GLUCONATE 1 APPLICATION(S): 213 SOLUTION TOPICAL at 21:42

## 2021-03-04 RX ADMIN — FOSPHENYTOIN 160 MILLIGRAM(S) PE: 50 INJECTION INTRAMUSCULAR; INTRAVENOUS at 11:49

## 2021-03-04 RX ADMIN — Medication 2 MILLIGRAM(S): at 05:05

## 2021-03-04 NOTE — AIRWAY REMOVAL NOTE  ADULT & PEDS - ARTIFICAL AIRWAY REMOVAL COMMENTS
Written order for extubation verified. The patient was identified by full name and birth date compared to the identification band. RN Sandra present during the procedure.

## 2021-03-04 NOTE — PROGRESS NOTE ADULT - SUBJECTIVE AND OBJECTIVE BOX
Weaned off propofol. EEG without seizure. D/w primary epileptologist. Plan was to cross titrate Xcorpi and DPH, but inconsistent Xcorpi use.     Exam:   Purposeful in all limbs but no command following.

## 2021-03-04 NOTE — PROGRESS NOTE ADULT - ASSESSMENT
Status epilepticus  - Monitor off propofol  - vEEG  - Hold DPH per Epilepsy  - Load VPA  - Continue VPA and increased levetiracetam  - Wean vent to extubate

## 2021-03-04 NOTE — ED PROVIDER NOTE - PHYSICAL EXAMINATION
Jarrod ALMANZAR MD PGY3:   PHYSICAL EXAM:    GENERAL: Seizing  HEENT:  Atraumatic, Normocephalic. Vomitus on shirt.   CHEST/LUNG: Chest rise equal bilaterally. CTAB.   HEART: Regular rate and rhythm. Tachycardic.   ABDOMEN: Soft, Nontender, Nondistended  EXTREMITIES:  2+ Peripheral Pulses.  PSYCH: seizing  SKIN: No obvious rashes or lesions  MSK: No joint pain or effusion.  NEUROLOGY: seizing

## 2021-03-04 NOTE — PROGRESS NOTE ADULT - SUBJECTIVE AND OBJECTIVE BOX
HPI: Pt is 70y R-handed M with h/o, HTN, HLD and L. temporal cavernoma s/p resection (2004) c/b focal epilepsy presenting to [facility] ED after being found unresponsive in his bathtub with noted right gaze deviation and right face and arm clonic activity. /p 8mg ativan s/p intubation/mechanical ventilation with etomidate and succinylcholine with Neurology consult for seizures. 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 Follows with Dr. Hoover as outpatient. Per chart review, patient had been on Keppra 750mg bid, dilantin 100mg daily, depakote 500|1000, and had been titrated off XCorpi. .      *** HIGH RISK OF DVT PRESENT ON ADMISSION due to immobility ***    ICU Vital Signs Last 24 Hrs  T(C): 36.1 (04 Mar 2021 08:45), Max: 37.7 (04 Mar 2021 05:39)  T(F): 96.9 (04 Mar 2021 08:45), Max: 99.9 (04 Mar 2021 05:39)  HR: 58 (04 Mar 2021 09:05) (58 - 119)  BP: 118/79 (04 Mar 2021 09:00) (101/65 - 237/132)  BP(mean): 93 (04 Mar 2021 09:00) (76 - 93)  RR: 20 (04 Mar 2021 09:00) (16 - 25)  SpO2: 100% (04 Mar 2021 09:05) (97% - 100%)      03-04-21 @ 07:01  -  03-04-21 @ 10:14  --------------------------------------------------------  IN: 0 mL / OUT: 175 mL / NET: -175 mL      Mode: AC/ CMV (Assist Control/ Continuous Mandatory Ventilation), RR (machine): 20, TV (machine): 500, FiO2: 50, PEEP: 5, ITime: 1, MAP: 9, PIP: 17    chlorhexidine 0.12% Liquid 15 milliLiter(s) Oral Mucosa every 12 hours  propofol Infusion 20 MICROgram(s)/kG/Min (13.2 mL/Hr) IV Continuous <Continuous>  sodium chloride 0.9%. 1000 milliLiter(s) (125 mL/Hr) IV Continuous <Continuous>      LABS:  Na: 137 (03-04 @ 05:43)  K: 3.6 (03-04 @ 05:43)  Cl: 101 (03-04 @ 05:43)  CO2: 24 (03-04 @ 05:43)  BUN: 8 (03-04 @ 05:43)  Cr: 0.77 (03-04 @ 05:43)  Glu: 168(03-04 @ 05:43)    Hgb: 14.5 (03-04 @ 05:43)  Hct: 43.1 (03-04 @ 05:43)  WBC: 11.92 (03-04 @ 05:43)  Plt: 190 (03-04 @ 05:43)    INR: 0.97 03-04-21 @ 05:43  PTT: 30.0 03-04-21 @ 05:43      LIVER FUNCTIONS - ( 04 Mar 2021 05:43 )  Alb: 4.1 g/dL / Pro: 6.7 g/dL / ALK PHOS: 77 U/L / ALT: 15 U/L / AST: 20 U/L / GGT: x           ABG - ( 04 Mar 2021 05:43 )  pH, Arterial: 7.29  pH, Blood: x     /  pCO2: 59    /  pO2: 144   / HCO3: 28    / Base Excess: -.1   /  SaO2: 99          EXAMINATION:  General: Intubated, on propofol @ 35  HEENT:  MMM  Neuro: No eye opening, Slight upward gaze but conjugate, pupils OU 2mm sluggish, BUE localizes L>R, BLE w/d  Cards:  RRR  Respiratory:  no respiratory distress  Abdomen:  soft  Extremities:  no edema  Skin:  warm/dry

## 2021-03-04 NOTE — CONSULT NOTE ADULT - ATTENDING COMMENTS
unclear reason for sedation in the context of focal status  plan: continue meds as above  decrease sedation and transfer to EMU   for further monitoring  start Risperidone 0.5 bid for known postictal psychosis in the past

## 2021-03-04 NOTE — ED PROVIDER NOTE - CARE PLAN
Principal Discharge DX:	Status epilepticus  Secondary Diagnosis:	Acute respiratory failure with hypoxia

## 2021-03-04 NOTE — ED ADULT NURSE NOTE - PMH
Cavernoma  S/p resection 2004  Essential hypertension    Other hyperlipidemia    Partial symptomatic epilepsy with complex partial seizures, not intractable, without status epilepticus  Since 2004 s/p L. temporal cavernoma resection

## 2021-03-04 NOTE — ED PROVIDER NOTE - OBJECTIVE STATEMENT
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 Not given any meds by EMS. PAtient was not responding and had the gaze deviation above c/f seizure activity and was given a total of 8 of ativan without improvement and began to become apneic. Decision was made to intubate.

## 2021-03-04 NOTE — ED ADULT NURSE NOTE - OBJECTIVE STATEMENT
patient is a 70 year old male with a PMH of seizures, HTN, HLD who presents to the ED from home via EMS s/p fall in shower. as per family patient was found laying in bath tub after hearing "a loud bang" patient was found covered in own vomitus. unknown LOC. patient has hx of seizure and recently has decreased medication for seizure as per neurologist. patient arrives to ED post ictal. patient is awake, but nonverbal, patient right arm "twitching", abd soft, nondistended, nontender, cap refill <3, radial pulses +2 bilaterally. upon arrival to ED patient had one episode of vomiting. patient5 oxygen saturation was 90% on room air. patient placed on NRB at 15L. patient 100% oxygen saturation at this time. IO placed by MD Coburn for access. medications given as per MD order. patient sinus tachycardia on monitor. patient intubated at bedside due to unknown trauma and inability to protect airway as per MD Coburn.

## 2021-03-04 NOTE — ED ADULT NURSE REASSESSMENT NOTE - NS ED NURSE REASSESS COMMENT FT1
patient intubated at 0517. MD Driscoll intubated with a 7.5 Tanz[place]. tube is 23 at the lip. patient positive color change and bilateral breath sounds noted.

## 2021-03-04 NOTE — ED ADULT NURSE REASSESSMENT NOTE - NS ED NURSE REASSESS COMMENT FT1
pt sedated, not fighting tube, pt has no movement, chest rise symmetrical, vitals stable leslee, safety precautions in place.

## 2021-03-04 NOTE — ED PROVIDER NOTE - ATTENDING CONTRIBUTION TO CARE
MD Coburn:  patient seen and evaluated personally.   I agree with the History & Physical,  Impression & Plan other than what was detailed in my note.  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 pt had normal bg, mildly tachy, bp stable, r gaze deviation noted not responsive, tc movement of arm, difficulty getting access b/l I o placed, ativan given 2mg at a time while airway equipment was prepared. gave 2mg x 4 with no termination of seizures, neuro was contacted early in eval to inform them of pt w/ status, pt was intubated w/ etomidate/sux, bp stable after this on prop gtt, taken for ct head/c spine, cxr pelvic xray. needed advancement of ett. pt to go to neuro icu. ,

## 2021-03-04 NOTE — PROGRESS NOTE ADULT - SUBJECTIVE AND OBJECTIVE BOX
O: EEG NO SEIZURES    T(C): 36.7 (03-04-21 @ 19:00), Max: 37.7 (03-04-21 @ 05:39)  HR: 70 (03-04-21 @ 19:00) (58 - 119)  BP: 96/71 (03-04-21 @ 19:00) (96/71 - 237/132)  RR: 15 (03-04-21 @ 19:00) (15 - 25)  SpO2: 100% (03-04-21 @ 19:00) (97% - 100%)  03-04-21 @ 07:01  -  03-04-21 @ 19:34  --------------------------------------------------------  IN: 1812.6 mL / OUT: 810 mL / NET: 1002.6 mL    chlorhexidine 0.12% Liquid 15 milliLiter(s) Oral Mucosa every 12 hours  chlorhexidine 4% Liquid 1 Application(s) Topical <User Schedule>  dexMEDEtomidine Infusion 0.5 MICROgram(s)/kG/Hr IV Continuous <Continuous>  enoxaparin Injectable 40 milliGRAM(s) SubCutaneous <User Schedule>  levETIRAcetam  IVPB 1000 milliGRAM(s) IV Intermittent <User Schedule>  pantoprazole  Injectable 40 milliGRAM(s) IV Push daily  polyethylene glycol 3350 17 Gram(s) Oral two times a day  propofol Infusion 20 MICROgram(s)/kG/Min IV Continuous <Continuous>  risperiDONE   Tablet 0.5 milliGRAM(s) Oral every 12 hours  senna Syrup 10 milliLiter(s) Oral at bedtime  sodium chloride 0.9%. 1000 milliLiter(s) IV Continuous <Continuous>  valproate sodium IVPB 500 milliGRAM(s) IV Intermittent every 8 hours  Mode: CPAP with PS, FiO2: 50, PEEP: 5, PS: 10, MAP: 9, PIP: 15    EXAMINATION:  General: Intubated, on propofol @ 35  HEENT:  MMM  Neuro: Eyes open spontaneously , pupils OU  4mm reactive, moves all 4 extremities antigravity   Cards:  RRR  Respiratory:  no respiratory distress  Abdomen:  soft  Extremities:  no edema  Skin:  warm/dry    LABS:  Na: 137 (03-04 @ 05:43)  K: 3.6 (03-04 @ 05:43)  Cl: 101 (03-04 @ 05:43)  CO2: 24 (03-04 @ 05:43)  BUN: 8 (03-04 @ 05:43)  Cr: 0.77 (03-04 @ 05:43)  Glu: 168(03-04 @ 05:43)    Hgb: 14.5 (03-04 @ 05:43)  Hct: 43.1 (03-04 @ 05:43)  WBC: 11.92 (03-04 @ 05:43)  Plt: 190 (03-04 @ 05:43)    INR: 0.97 03-04-21 @ 05:43  PTT: 30.0 03-04-21 @ 05:43      CT BRAIN: Stable head CT. No acute intracranial hemorrhage, mass effect, or shift.    CT CERVICAL SPINE: No fracture or subluxation.  	  ASSESSMENT/PLANS: Status epilepticus in the setting of down titration of Xcopri versus other potential provoked cause.  EEG REVIEWED NO SEIZURES SO FAR  continue keppra, and valproic acid  check VA levels   neurochecks q 1 hr  Patinet was placed on PS, he was pulling good TV, he was awake, no secretions, has a cuff leak, so he was extubated successfully , he is stable post-extubation   d/c precedex  d/c Risperdal   SBP goal 100- 160  decrease NS to 75 ml   Diet: keep NPO as he just got extubated  will need swallow eval   Goal euglycemia (-180)  VTE prophylaxis: [] SCDs [x] chemoprophylaxis [] hold chemoprophylaxis due to: [] high risk of DVT/PE on admission due to:  Lovenox   Afebrile      MISC:    SOCIAL/FAMILY:  [x] awaiting [] updated at bedside [] family meeting    CODE STATUS:  [x] Full Code [] DNR [] DNI [] Palliative/Comfort Care    DISPOSITION:  [x] ICU [] Stroke Unit [] Floor [] EMU [] RCU [] PCU    [x] Patient is at high risk of neurologic deterioration/death due to: status epilepticus    Time spent: 40  critical care min , extubated at bedside monitor respiratory status   Contact: [phone]

## 2021-03-04 NOTE — ED PROVIDER NOTE - CLINICAL SUMMARY MEDICAL DECISION MAKING FREE TEXT BOX
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 Patient loaded with Keppra. Neuro consulted. Likely dispo to NSICU for management of status.

## 2021-03-04 NOTE — CHART NOTE - NSCHARTNOTEFT_GEN_A_CORE
CAPRINI SCORE [CLOT] Score on Admission for     AGE RELATED RISK FACTORS                                                       MOBILITY RELATED FACTORS  [ ] Age 41-60 years                                            (1 Point)                  [ ] Bed rest                                                        (1 Point)  [x ] Age: 61-74 years                                           (2 Points)                 [ ] Plaster cast                                                   (2 Points)  [ ] Age= 75 years                                              (3 Points)                 [ ] Bed bound for more than 72 hours                 (2 Points)    DISEASE RELATED RISK FACTORS                                               GENDER SPECIFIC FACTORS  [ ] Edema in the lower extremities                       (1 Point)                  [ ] Pregnancy                                                     (1 Point)  [ ] Varicose veins                                               (1 Point)                  [ ] Post-partum < 6 weeks                                   (1 Point)             [ x] BMI > 25 Kg/m2                                            (1 Point)                  [ ] Hormonal therapy  or oral contraception          (1 Point)                 [ ] Sepsis (in the previous month)                        (1 Point)                  [ ] History of pregnancy complications                 (1 point)  [ ] Pneumonia or serious lung disease                                               [ ] Unexplained or recurrent                     (1 Point)           (in the previous month)                               (1 Point)  [ ] Abnormal pulmonary function test                     (1 Point)                 SURGERY RELATED RISK FACTORS (include planned surgeries)  [ ] Acute myocardial infarction                              (1 Point)                 [ ]  Section                                             (1 Point)  [ ] Congestive heart failure (in the previous month)  (1 Point)               [ ] Minor surgery                                                  (1 Point)   [ ] Inflammatory bowel disease                             (1 Point)                 [ ] Arthroscopic surgery                                        (2 Points)  [ ] Central venous access                                      (2 Points)                [ x] General surgery lasting more than 45 minutes   (2 Points)       [ ] Stroke (in the previous month)                          (5 Points)               [ ] Elective arthroplasty                                         (5 Points)            [ ] Current or past malignancy                                (2 Points)                                                                                                     HEMATOLOGY RELATED FACTORS                                                 TRAUMA RELATED RISK FACTORS  [ ] Prior episodes of VTE                                     (3 Points)                [ ] Fracture of the hip, pelvis, or leg                       (5 Points)  [ ] Positive family history for VTE                         (3 Points)                 [ ] Acute spinal cord injury (in the previous month)  (5 Points)  [ ] Prothrombin 18540 A                                     (3 Points)                 [ ] Paralysis  (less than 1 month)                             (5 Points)  [ ] Factor V Leiden                                             (3 Points)                  [ ] Multiple Trauma within 1 month                        (5 Points)  [ ] Lupus anticoagulants                                     (3 Points)                                                           [ ] Anticardiolipin antibodies                               (3 Points)                                                       [ ] High homocysteine in the blood                      (3 Points)                                             [ ] Other congenital or acquired thrombophilia      (3 Points)                                                [ ] Heparin induced thrombocytopenia                  (3 Points)                                          Total Score [  5        ]    Risk:  Very low 0   Low 1 to 2   Moderate 3 to 4   High =5       VTE Prophylasix Recommednations:  [x ] mechanical pneumatic compression devices                                      [ ] contraindicated: _____________________  [ ] chemo prophylasix                                                                                   [ ] contraindicated _____________________    **** HIGH LIKELIHOOD DVT PRESENT ON ADMISSION  [ x] (please order LE dopplers within 24 hours of admission)

## 2021-03-04 NOTE — CHART NOTE - NSCHARTNOTEFT_GEN_A_CORE
PRELIMINARY EEG REVIEW    EEG reviewed to 14:39  Date: 03-04-21    - No epileptiform pattern or seizure seen.    This Preliminary report is based on fellow review. Final report pending Completion of study tomorrow morning and following attending review.    Reading Room: [phone]  On Call Service After Hours: [phone]    Pito Person MD PGY-5  Epilepsy Fellow    This Preliminary report is based on fellow review. Final report pending attending review.    Reading Room: [phone]  On Call Service After Hours: [phone]

## 2021-03-04 NOTE — CONSULT NOTE ADULT - SUBJECTIVE AND OBJECTIVE BOX
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 Follows with Dr. Hoover as outpatient. Per chart review, patient had been on keppra 750mg bid, dilantin 100mg daily, depakote 500|1000, and had been titrated off xcopri.     Complex partial epilepsy with generalization and with intractable epilepsy, Fatigue,  Memory difficulty       REVIEW OF SYSTEMS  unable to obtain at this time	    PAST MEDICAL & SURGICAL HISTORY:  Partial symptomatic epilepsy with complex partial seizures, not intractable, without status epilepticus  Since 2004 s/p L. temporal cavernoma resection    Other hyperlipidemia    Essential hypertension    Cavernoma  S/p resection 2004    Status post craniectomy  2004      FAMILY HISTORY:  No pertinent family history in first degree relatives    MEDICATIONS (HOME):  Home Medications:  escitalopram 10 mg oral tablet: 1 tab(s) orally once a day (31 Oct 2020 11:16)  finasteride 5 mg oral tablet: 1 tab(s) orally once a day (31 Oct 2020 11:16)  losartan 50 mg oral tablet: 1 tab(s) orally once a day (31 Oct 2020 11:16)  simvastatin 10 mg oral tablet: 1 tab(s) orally once a day (at bedtime) (31 Oct 2020 11:16)    MEDICATIONS  (STANDING):  chlorhexidine 0.12% Liquid 15 milliLiter(s) Oral Mucosa every 12 hours  propofol Infusion 20 MICROgram(s)/kG/Min (13.2 mL/Hr) IV Continuous <Continuous>  sodium chloride 0.9%. 1000 milliLiter(s) (125 mL/Hr) IV Continuous <Continuous>    MEDICATIONS  (PRN):    ALLERGIES/INTOLERANCES:  Allergies  No Known Allergies    VITALS & EXAMINATION:  Vital Signs Last 24 Hrs  T(C): --  T(F): --  HR: 119 (04 Mar 2021 05:12) (92 - 119)  BP: 237/132 (04 Mar 2021 05:12) (150/114 - 237/132)  BP(mean): --  RR: 17 (04 Mar 2021 05:12) (17 - 25)  SpO2: 100% (04 Mar 2021 05:12) (100% - 100%)    Neurological (>12):  MS: intubated, sedated on propofol 20 and s/p etomidate & succinylcholine, eyes closed, does not open eyes nor respond to voice nor noxious stim, no verbal output    CNs: PER (3mm OD, OS) minimally reactive to light; equivocal corneal reflex, cough reflex intact, no gross facial asymmetry    Motor: no spontaneous movement of extremities, no movement nor withdrawal to noxious stim, plantar mute b/l       LABORATORY:      STUDIES & IMAGING:  Studies (EKG, EEG, EMG, etc):     Radiology (XR, CT, MR, U/S, TTE/SAHIL):

## 2021-03-04 NOTE — ED PROVIDER NOTE - PROGRESS NOTE DETAILS
Attending Masom:  pt status epilepticus, given 2 mg ativan x 4 with no improvement, cont r gaze, intubated for protection of airway and testing. Jarrod ALMANZAR MD PGY3: Patient stable with VSS in ED. TBA Neuro ICU norberto Dr Hoskins. No visible evidence of seizure activity at this time.

## 2021-03-04 NOTE — ED ADULT NURSE REASSESSMENT NOTE - NS ED NURSE REASSESS COMMENT FT1
patient came to ED and is thought to be actively seizing at this time/ postictal. due to condition unable to determine. patient right side noted to be focally twitching and left side seemed to have strength and sensation intact at this time. patient reacts to painful stimuli of left side. patient nonverbal, lethargic  and not following commands at this time.

## 2021-03-04 NOTE — ED ADULT NURSE REASSESSMENT NOTE - NS ED NURSE REASSESS COMMENT FT1
pt was having a gag reflex and trying to cough and moving extremities, propofol increased 5mcg at 740, increased 5mcg 745, pt sedated leslee, vitals stable, safety precautions in place.

## 2021-03-04 NOTE — ED ADULT NURSE REASSESSMENT NOTE - NS ED NURSE REASSESS COMMENT FT1
patient lyons placed at this time. sterile technique used and maintained. RN Kate at bedside to place lyons with primary RN. patient tolerated well. clear yellow urine draining at this time.

## 2021-03-04 NOTE — PROGRESS NOTE ADULT - ASSESSMENT
ASSESSMENT/PLANS: Status epilepticus in the setting of down titration of Xcopri versus other potential provoked cause.    NEURO:  CT head reviewed; stable from prior  Q1 pupil checks, q2hr full neurochecks  vEEG urgently  s/p ativan 8mg  Check levetiracetam levels, valproic acid levels  Phenytoin levels low; will load and continue c/w depakote 500mg am,1000mg pm  c/w keppra 750mg bid IV  Activity: [] Mobilize as tolerated [] Bedrest [] PT [] OT [] PMNR    PULM: Intubated   Vent settings 20/500/50/5  ABG, CXR  Adjust Vt    CV:  SBP goal 100- 160  MAP >65  Baseline EKG    RENAL:  Fluids: NS@75  Replete lytes  CK levels    GI:  Diet: NPO for now. Start TFS later today  GI prophylaxis [] not indicated [x] PPI [] other:  Bowel regimen [] colace [x] senna [] other:  LFTS    ENDO:   Goal euglycemia (-180)  TSH, ammonia    HEME/ONC:  VTE prophylaxis: [] SCDs [x] chemoprophylaxis [] hold chemoprophylaxis due to: [] high risk of DVT/PE on admission due to:  Lovenox  Anti Xa level    ID: Afebrile  Cultures - follow up    MISC:    SOCIAL/FAMILY:  [x] awaiting [] updated at bedside [] family meeting    CODE STATUS:  [x] Full Code [] DNR [] DNI [] Palliative/Comfort Care    DISPOSITION:  [x] ICU [] Stroke Unit [] Floor [] EMU [] RCU [] PCU    [x Patient is at high risk of neurologic deterioration/death due to: status epilepticus    Time spent: 40 critical care minutes    Contact: [phone] ASSESSMENT/PLANS: Status epilepticus in the setting of down titration of Xcopri versus other potential provoked cause.    NEURO:  CT head reviewed; stable from prior  Q1 pupil checks, q2hr full neurochecks  vEEG urgently  s/p ativan 8mg  Check levetiracetam levels, valproic acid levels  Increase levetiracetam, continue VPA (load if needed)  Activity: [] Mobilize as tolerated [x] Bedrest [] PT [] OT [] PMNR    PULM: Intubated   Vent settings 20/500/50/5  ABG, CXR  Adjust Vt    CV:  SBP goal 100- 160  MAP >65  Baseline EKG    RENAL:  Fluids: NS@75  Replete lytes  CK levels    GI:  Diet: NPO for now. Start TFS later today  GI prophylaxis [] not indicated [x] PPI [] other:  Bowel regimen [] colace [x] senna [] other:  LFTS    ENDO:   Goal euglycemia (-180)  TSH, ammonia    HEME/ONC:  VTE prophylaxis: [] SCDs [x] chemoprophylaxis [] hold chemoprophylaxis due to: [] high risk of DVT/PE on admission due to:  Lovenox  Anti Xa level    ID: Afebrile  Cultures - follow up    MISC:    SOCIAL/FAMILY:  [x] awaiting [] updated at bedside [] family meeting    CODE STATUS:  [x] Full Code [] DNR [] DNI [] Palliative/Comfort Care    DISPOSITION:  [x] ICU [] Stroke Unit [] Floor [] EMU [] RCU [] PCU    [x] Patient is at high risk of neurologic deterioration/death due to: status epilepticus    Time spent: 45 critical care minutes    Contact: [phone]

## 2021-03-05 ENCOUNTER — TRANSCRIPTION ENCOUNTER (OUTPATIENT)
Age: 71
End: 2021-03-05

## 2021-03-05 LAB
CULTURE RESULTS: NO GROWTH — SIGNIFICANT CHANGE UP
SPECIMEN SOURCE: SIGNIFICANT CHANGE UP

## 2021-03-05 PROCEDURE — 95720 EEG PHY/QHP EA INCR W/VEEG: CPT

## 2021-03-05 PROCEDURE — 99233 SBSQ HOSP IP/OBS HIGH 50: CPT

## 2021-03-05 RX ORDER — LANOLIN ALCOHOL/MO/W.PET/CERES
5 CREAM (GRAM) TOPICAL ONCE
Refills: 0 | Status: COMPLETED | OUTPATIENT
Start: 2021-03-05 | End: 2021-03-05

## 2021-03-05 RX ORDER — LOSARTAN POTASSIUM 100 MG/1
50 TABLET, FILM COATED ORAL DAILY
Refills: 0 | Status: DISCONTINUED | OUTPATIENT
Start: 2021-03-05 | End: 2021-03-06

## 2021-03-05 RX ORDER — ONDANSETRON 8 MG/1
4 TABLET, FILM COATED ORAL ONCE
Refills: 0 | Status: COMPLETED | OUTPATIENT
Start: 2021-03-05 | End: 2021-03-05

## 2021-03-05 RX ORDER — FINASTERIDE 5 MG/1
5 TABLET, FILM COATED ORAL DAILY
Refills: 0 | Status: DISCONTINUED | OUTPATIENT
Start: 2021-03-05 | End: 2021-03-06

## 2021-03-05 RX ORDER — ACETAMINOPHEN 500 MG
650 TABLET ORAL EVERY 6 HOURS
Refills: 0 | Status: DISCONTINUED | OUTPATIENT
Start: 2021-03-05 | End: 2021-03-06

## 2021-03-05 RX ADMIN — POLYETHYLENE GLYCOL 3350 17 GRAM(S): 17 POWDER, FOR SOLUTION ORAL at 05:08

## 2021-03-05 RX ADMIN — LEVETIRACETAM 400 MILLIGRAM(S): 250 TABLET, FILM COATED ORAL at 11:00

## 2021-03-05 RX ADMIN — ENOXAPARIN SODIUM 40 MILLIGRAM(S): 100 INJECTION SUBCUTANEOUS at 18:12

## 2021-03-05 RX ADMIN — Medication 27.5 MILLIGRAM(S): at 21:20

## 2021-03-05 RX ADMIN — LEVETIRACETAM 400 MILLIGRAM(S): 250 TABLET, FILM COATED ORAL at 18:12

## 2021-03-05 RX ADMIN — Medication 5 MILLIGRAM(S): at 23:30

## 2021-03-05 RX ADMIN — ONDANSETRON 4 MILLIGRAM(S): 8 TABLET, FILM COATED ORAL at 00:45

## 2021-03-05 RX ADMIN — Medication 27.5 MILLIGRAM(S): at 05:08

## 2021-03-05 RX ADMIN — SODIUM CHLORIDE 75 MILLILITER(S): 9 INJECTION INTRAMUSCULAR; INTRAVENOUS; SUBCUTANEOUS at 06:26

## 2021-03-05 RX ADMIN — POLYETHYLENE GLYCOL 3350 17 GRAM(S): 17 POWDER, FOR SOLUTION ORAL at 18:12

## 2021-03-05 RX ADMIN — Medication 27.5 MILLIGRAM(S): at 13:12

## 2021-03-05 RX ADMIN — LEVETIRACETAM 400 MILLIGRAM(S): 250 TABLET, FILM COATED ORAL at 02:20

## 2021-03-05 NOTE — SWALLOW BEDSIDE ASSESSMENT ADULT - SLP GENERAL OBSERVATIONS
Pt seen at bedside, awake and alert, oriented grossly x4, verbally responsive, with word-finding errors noted, following directions for the purposes of the exam.

## 2021-03-05 NOTE — SWALLOW BEDSIDE ASSESSMENT ADULT - ASR SWALLOW ASPIRATION MONITOR
Monitor for s/s aspiration/laryngeal penetration. If noted:  D/C p.o. intake, provide non-oral nutrition/hydration/meds, and contact this service @ [phone]/change of breathing pattern/cough/gurgly voice/fever/pneumonia/throat clearing/upper respiratory infection

## 2021-03-05 NOTE — EEG REPORT - NS EEG TEXT BOX
[facility]   COMPREHENSIVE EPILEPSY CENTER   REPORT OF LONG-TERM VIDEO EEG     [facility]: [address], [place] [address], Ph#: [phone]  [facility]: [id number] [place] [address], Ph#: [phone]  [facility]: [address] [place] [address], Ph#: [phone]    Patient Name: MAURO NUNEZ  Age and : 70y ([birth year])  MRN #: [id number]  Location: [place] [address]  Referring Physician: April Lala    Start Time/Date: 12:13 21  End Time/Date: 09:30 on 21  Duration: 21:15    _____________________________________________________________  STUDY INFORMATION    EEG Recording Technique:  The patient underwent continuous Video-EEG monitoring, using Telemetry System hardware on the XLTek Digital System. EEG and video data were stored on a computer hard drive with important events saved in digital archive files. The material was reviewed by a physician (electroencephalographer / epileptologist) on a daily basis. Alejandro and seizure detection algorithms were utilized and reviewed. An EEG Technician attended to the patient, and was available throughout daytime work hours.  The epilepsy center neurologist was available in person or on call 24-hours per day.    EEG Placement and Labeling of Electrodes:  The EEG was performed utilizing 20 channel referential EEG connections (coronal over temporal over parasagittal montage) using all standard 10-20 electrode placements with EKG, with additional electrodes placed in the inferior temporal region using the modified 10-10 montage electrode placements for elective admissions, or if deemed necessary. Recording was at a sampling rate of 256 samples per second per channel. Time synchronized digital video recording was done simultaneously with EEG recording. A low light infrared camera was used for low light recording.     _____________________________________________________________  HISTORY    PERTINENT MEDICATION:  levETIRAcetam  IVPB 1000 milliGRAM(s) IV Intermittent <User Schedule>  valproate sodium IVPB 500 milliGRAM(s) IV Intermittent every 8 hours    _____________________________________________________________  STUDY INTERPRETATION    Findings: The background was continuous, spontaneously variable and reactive. During wakefulness, the posterior dominant rhythm consisted of well-modulated 9 Hz activity, with amplitude to 30 uV, that attenuated to eye opening.      Background Slowing:  No generalized background slowing was present.    Focal Slowing:   Intermittent polymorphic delta slowing in the left temporal region.    Sleep Background:  Drowsiness was characterized by fragmentation, attenuation, and slowing of the background activity.    Sleep was characterized by the presence of vertex waves, symmetric sleep spindles and K-complexes.    Other Non-Epileptiform Findings:  Breach effect max in left frontotemporal region characterized by higher amplitude, sharply contoured waves    Interictal Epileptiform Activity:   None were present.    Events:  Clinical events: None recorded.  Seizures: None recorded.    Activation Procedures:   Hyperventilation was not performed.    Photic stimulation was not performed.     Artifacts:  Intermittent myogenic and movement artifacts were noted.    ECG:  The heart rate on single channel ECG was predominantly between 80-90 BPM.    _____________________________________________________________  EEG SUMMARY/CLASSIFICATION     Abnormal EEG in the awake, drowsy and asleep states.  - Intermittent polymorphic delta slowing in the left temporal region.  - Breach effect max in left frontotemporal region characterized by higher amplitude, sharply contoured waves  _____________________________________________________________  EEG IMPRESSION/CLINICAL CORRELATE    Abnormal EEG study.  1. Structural or functional abnormality in the left temporal area  2. No epileptiform pattern or seizure seen.  3. Left temporal skull defect max in the     Pito Person MD PGY-5  Epilepsy Fellow    This Preliminary report is based on fellow review. Final report pending attending review.    Reading Room: [phone]  On Call Service After Hours: [phone]     [facility]   COMPREHENSIVE EPILEPSY CENTER   REPORT OF LONG-TERM VIDEO EEG     [facility]: [address], [place] [address], Ph#: [phone]  [facility]: [id number], [place] [address], Ph#: [phone]  [facility]: [address] [place] [address], Ph#: [phone]    Patient Name: MAURO NUNEZ  Age and : 70y ([birth year])  MRN #: [id number]  Location: [place] [address]  Referring Physician: April Lala    Start Time/Date: 12:13 21  End Time/Date: 09:30 on 21  Duration: 21:15hrs    _____________________________________________________________  STUDY INFORMATION    EEG Recording Technique:  The patient underwent continuous Video-EEG monitoring, using Telemetry System hardware on the XLTek Digital System. EEG and video data were stored on a computer hard drive with important events saved in digital archive files. The material was reviewed by a physician (electroencephalographer / epileptologist) on a daily basis. Alejandro and seizure detection algorithms were utilized and reviewed. An EEG Technician attended to the patient, and was available throughout daytime work hours.  The epilepsy center neurologist was available in person or on call 24-hours per day.    EEG Placement and Labeling of Electrodes:  The EEG was performed utilizing 20 channel referential EEG connections (coronal over temporal over parasagittal montage) using all standard 10-20 electrode placements with EKG, with additional electrodes placed in the inferior temporal region using the modified 10-10 montage electrode placements for elective admissions, or if deemed necessary. Recording was at a sampling rate of 256 samples per second per channel. Time synchronized digital video recording was done simultaneously with EEG recording. A low light infrared camera was used for low light recording.     _____________________________________________________________  HISTORY    PERTINENT MEDICATION:  levETIRAcetam  IVPB 1000 milliGRAM(s) IV Intermittent <User Schedule>  valproate sodium IVPB 500 milliGRAM(s) IV Intermittent every 8 hours    _____________________________________________________________  STUDY INTERPRETATION    Findings: The background was continuous, spontaneously variable and reactive. During wakefulness, the posterior dominant rhythm consisted of well-modulated 9 Hz activity, with amplitude to 30 uV, that attenuated to eye opening.      Background Slowing:  No generalized background slowing was present.    Focal Slowing:   Intermittent polymorphic delta slowing in the left temporal region.    Sleep Background:  Drowsiness was characterized by fragmentation, attenuation, and slowing of the background activity.    Sleep was characterized by the presence of vertex waves, symmetric sleep spindles and K-complexes.    Other Non-Epileptiform Findings:  Breach effect max in left frontotemporal region characterized by higher amplitude, sharply contoured waves    Interictal Epileptiform Activity:   Rare F7 max sharp waves    Events:  Clinical events: None recorded.  Seizures: None recorded.    Activation Procedures:   Hyperventilation was not performed.    Photic stimulation was not performed.     Artifacts:  Intermittent myogenic and movement artifacts were noted.    ECG:  The heart rate on single channel ECG was predominantly between 80-90 BPM.    _____________________________________________________________  EEG SUMMARY/CLASSIFICATION   Abnormal EEG in the awake, drowsy and asleep states.  - Rare F7 max sharp waves  - Intermittent polymorphic delta slowing in the left temporal region.  - Breach effect max in left frontotemporal region   _____________________________________________________________  EEG IMPRESSION/CLINICAL CORRELATE    Abnormal EEG study.  Risk of focal onset seizures from the right anterior temporal region  Structural or functional abnormality in the left temporal area  Left temporal skull defect    Pito Person MD PGY-5  Epilepsy Fellow    Reading Room: [phone]  On Call Service After Hours: [phone]

## 2021-03-05 NOTE — PROGRESS NOTE ADULT - ASSESSMENT
ASSESSMENT:     PLAN:  ASSESSMENT/PLANS: Focal status epilepticus in the setting of down titration of Xcopri versus other potential provoked cause.    NEURO:  CT head reviewed; stable from prior  Q4 neuro checks   vEEG no seizures , follow up official report   continue keppra and valproic acid   Activity: [] Mobilize as tolerated [] Bedrest [x] PT [x] OT [] PMNR    PULM: extubated yesterday   RA  IS     CV:  SBP goal 100- 160    RENAL:  IVL once feeds are started   Replete lytes  CK levels    GI:  Diet: swallow eval, and advance diet as tolerated   GI prophylaxis [] not indicated [x] PPI [] other:  Bowel regimen [] colace [x] senna [] other:    ENDO:   Goal euglycemia (-180)      HEME/ONC:  VTE prophylaxis: [] SCDs [x] chemoprophylaxis [] hold chemoprophylaxis due to: [] high risk of DVT/PE on admission due to:  Lovenox  Anti Xa level    ID: Afebrile      CORE MEASURES:        AED levels [] Sent [] Pending [x] Resulted     LFTs [x] normal [] elevated      Plan and education provided to [x] patient []family at bedside [] awaiting for family     Seizure Semiology  [] Tonic clonic  [] Clonic  [] Tonic  [] Unresponsive  [x] Focal with impaired awareness  [] Focal without impaired awareness    Obtain screening lower extremity venous ultrasound in patients who meet 1 or more of the following criteria as patient is high risk for DVT/PE on admission:   [] History of DVT/PE  []Hypercoagulable states (Factor V Leiden, Cancer, OCP, etc. )  []Prolonged immobility (hemiplegia/hemiparesis/post operative or any other extended immobilization)  [] Transferred from outside facility (Rehab or Long term care) ASSESSMENT:     PLAN:  ASSESSMENT/PLANS: Focal status epilepticus in the setting of down titration of Xcopri versus other potential provoked cause.    NEURO:  CT head reviewed; stable from prior  Q4 neuro checks   vEEG no seizures   continue keppra and valproic acid   Activity: [] Mobilize as tolerated [] Bedrest [x] PT [x] OT [] PMNR  Soft diet   Move to 4C EMU once bed available     PULM: extubated yesterday   RA  IS     CV:  SBP goal 100- 160    RENAL:  Replete lytes  CK levels    GI:  Diet: swallow eval, started on soft diet  PPX ot indicated [x] PPI [] other:  Bowel regimen [] colace [x] senna [] other:    ENDO:   Goal euglycemia (-180)    HEME/ONC:  VTE prophylaxis: [] SCDs [x] chemoprophylaxis [] hold chemoprophylaxis due to: [] high risk of DVT/PE on admission due to:  Lovenox  Anti Xa level    ID: Afebrile    CORE MEASURES:        AED levels [] Sent [] Pending [x] Resulted     LFTs [x] normal [] elevated      Plan and education provided to [x] patient []family at bedside [] awaiting for family     Seizure Semiology  [] Tonic clonic  [] Clonic  [] Tonic  [] Unresponsive  [x] Focal with impaired awareness  [] Focal without impaired awareness    Obtain screening lower extremity venous ultrasound in patients who meet 1 or more of the following criteria as patient is high risk for DVT/PE on admission:   [] History of DVT/PE  []Hypercoagulable states (Factor V Leiden, Cancer, OCP, etc. )  []Prolonged immobility (hemiplegia/hemiparesis/post operative or any other extended immobilization)  [] Transferred from outside facility (Rehab or Long term care)

## 2021-03-05 NOTE — PROGRESS NOTE ADULT - SUBJECTIVE AND OBJECTIVE BOX
THE PATIENT WAS SEEN AND EXAMINED BY ME WITH THE HOUSESTAFF DURING MORNING ROUNDS.   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 Follows with Dr. Hoover as outpatient. Per chart review, patient had been on keppra 750mg bid, dilantin 100mg daily, depakote 500|1000, and had been titrated off xcopri.     Complex partial epilepsy with generalization and with intractable epilepsy, Fatigue,  Memory difficulty     REVIEW OF SYSTEMS  unable to obtain at this time	    PAST MEDICAL & SURGICAL HISTORY:  Partial symptomatic epilepsy with complex partial seizures, not intractable, without status epilepticus  Since 2004 s/p L. temporal cavernoma resection    ROS: Otherwise negative.     SUBJECTIVE: Extubated yesterday.     chlorhexidine 4% Liquid 1 Application(s) Topical <User Schedule>  enoxaparin Injectable 40 milliGRAM(s) SubCutaneous <User Schedule>  levETIRAcetam  IVPB 1000 milliGRAM(s) IV Intermittent <User Schedule>  polyethylene glycol 3350 17 Gram(s) Oral two times a day  senna Syrup 10 milliLiter(s) Oral at bedtime  sodium chloride 0.9%. 1000 milliLiter(s) IV Continuous <Continuous>  valproate sodium IVPB 500 milliGRAM(s) IV Intermittent every 8 hours      Physical Exam: Neurological Exam:  	Mental Status: Orientated to self, date and place.  Attention intact.  No dysarthria, aphasia or neglect.  	Cranial Nerves: PERRL, EOMI, no nystagmus or diplopia. No facial asymmetry.  Hearing intact to finger rub bilaterally.  Tongue, uvula and palate midline.  Sternocleidomastoid and Trapezius intact bilaterally  	Motor: moving all 4 extremities equally w 5/5 strength  	Tone: normal.                  	Pronator drift: none                 	Dysmetria: None to finger-nose-finger  	No truncal ataxia.    	Tremor: No resting, postural or action tremor.  No myoclonus.  	Sensation: intact to light touch    LABS:                        13.0   9.87  )-----------( 154      ( 04 Mar 2021 20:56 )             39.1    03-04    141  |  105  |  10  ----------------------------<  118<H>  4.3   |  21<L>  |  0.96    Ca    8.3<L>      04 Mar 2021 20:56  Phos  3.4     03-04  Mg     1.6     03-04    TPro  6.7  /  Alb  4.1  /  TBili  0.2  /  DBili  x   /  AST  20  /  ALT  15  /  AlkPhos  77  03-04  PT/INR - ( 04 Mar 2021 05:43 )   PT: 11.6 sec;   INR: 0.97 ratio         PTT - ( 04 Mar 2021 05:43 )  PTT:30.0 sec     COVID-19 PCR: NotDetec (04 Mar 2021 05:38)      IMAGING:     CT BRAIN (3/4/21): Stable head CT. No acute intracranial hemorrhage, mass effect, or shift.    CT CERVICAL SPINE (3/4/21): No fracture or subluxation.    EEG (3/4/21):        THE PATIENT WAS SEEN AND EXAMINED BY ME WITH THE HOUSESTAFF DURING MORNING ROUNDS.   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 ED after being found unresponsive in his bathtub with noted right gaze deviation and right face and arm clonic activity s/p 8mg ativan s/p intubation/mechanical ventilation with etomidate and succinylcholine with Neurology consult for seizures. 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 Follows with Dr. Hoover as outpatient. Per chart review, patient had been on keppra 750mg bid, dilantin 100mg daily, depakote 500|1000, and had been titrated off xcopri.     Complex partial epilepsy with generalization and with intractable epilepsy, Fatigue,  Memory difficulty     REVIEW OF SYSTEMS  unable to obtain at this time	    PAST MEDICAL & SURGICAL HISTORY:  Partial symptomatic epilepsy with complex partial seizures, not intractable, without status epilepticus  Since 2004 s/p L. temporal cavernoma resection    ROS: Otherwise negative.     SUBJECTIVE: Extubated yesterday.     chlorhexidine 4% Liquid 1 Application(s) Topical <User Schedule>  enoxaparin Injectable 40 milliGRAM(s) SubCutaneous <User Schedule>  levETIRAcetam  IVPB 1000 milliGRAM(s) IV Intermittent <User Schedule>  polyethylene glycol 3350 17 Gram(s) Oral two times a day  senna Syrup 10 milliLiter(s) Oral at bedtime  sodium chloride 0.9%. 1000 milliLiter(s) IV Continuous <Continuous>  valproate sodium IVPB 500 milliGRAM(s) IV Intermittent every 8 hours    EXAMINATION:  General: Sleeping in bed, awakes to verbal stimuli, following commands  HEENT:  MMM  Neuro: EO spont, oriented to self only, follows commands, face symmetric, clear voice, full strength   Respiratory:  no respiratory distress, on RA  Abdomen:  soft  Extremities:  no edema  Skin:  warm/dry    LABS:                        13.0   9.87  )-----------( 154      ( 04 Mar 2021 20:56 )             39.1    03-04    141  |  105  |  10  ----------------------------<  118<H>  4.3   |  21<L>  |  0.96    Ca    8.3<L>      04 Mar 2021 20:56  Phos  3.4     03-04  Mg     1.6     03-04    TPro  6.7  /  Alb  4.1  /  TBili  0.2  /  DBili  x   /  AST  20  /  ALT  15  /  AlkPhos  77  03-04  PT/INR - ( 04 Mar 2021 05:43 )   PT: 11.6 sec;   INR: 0.97 ratio         PTT - ( 04 Mar 2021 05:43 )  PTT:30.0 sec     COVID-19 PCR: NotDetec (04 Mar 2021 05:38)      IMAGING:     CT BRAIN (3/4/21): Stable head CT. No acute intracranial hemorrhage, mass effect, or shift.    CT CERVICAL SPINE (3/4/21): No fracture or subluxation.    EEG (3/4/21):  Abnormal EEG study. Risk of focal onset seizures from the right anterior temporal region, structural or functional abnormality in the left temporal area, Left temporal skull defect          THE PATIENT WAS SEEN AND EXAMINED BY ME WITH THE HOUSESTAFF DURING MORNING ROUNDS.   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 ED after being found unresponsive in his bathtub with noted right gaze deviation and right face and arm clonic activity s/p 8mg ativan s/p intubation/mechanical ventilation with etomidate and succinylcholine with Neurology consult for seizures. 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 Follows with Dr. Hoover as outpatient. Per chart review, patient had been on keppra 750mg bid, dilantin 100mg daily, depakote 500|1000, and had been titrated off xcopri.     Complex partial epilepsy with generalization and with intractable epilepsy, Fatigue,  Memory difficulty     REVIEW OF SYSTEMS  unable to obtain at this time	    PAST MEDICAL & SURGICAL HISTORY:  Partial symptomatic epilepsy with complex partial seizures, not intractable, without status epilepticus  Since 2004 s/p L. temporal cavernoma resection    ROS: Otherwise negative.     SUBJECTIVE: Extubated yesterday.     chlorhexidine 4% Liquid 1 Application(s) Topical <User Schedule>  enoxaparin Injectable 40 milliGRAM(s) SubCutaneous <User Schedule>  levETIRAcetam  IVPB 1000 milliGRAM(s) IV Intermittent <User Schedule>  polyethylene glycol 3350 17 Gram(s) Oral two times a day  senna Syrup 10 milliLiter(s) Oral at bedtime  sodium chloride 0.9%. 1000 milliLiter(s) IV Continuous <Continuous>  valproate sodium IVPB 500 milliGRAM(s) IV Intermittent every 8 hours    EXAMINATION:  General: Sleeping in bed  HEENT:  MMM  Neuro: Eyes open to verbal stimuli, oriented to self only, follows commands, face symmetric, clear voice, full strength   Respiratory:  no respiratory distress, on RA  Abdomen:  soft  Extremities:  no edema  Skin:  warm/dry    LABS:                        13.0   9.87  )-----------( 154      ( 04 Mar 2021 20:56 )             39.1    03-04    141  |  105  |  10  ----------------------------<  118<H>  4.3   |  21<L>  |  0.96    Ca    8.3<L>      04 Mar 2021 20:56  Phos  3.4     03-04  Mg     1.6     03-04    TPro  6.7  /  Alb  4.1  /  TBili  0.2  /  DBili  x   /  AST  20  /  ALT  15  /  AlkPhos  77  03-04  PT/INR - ( 04 Mar 2021 05:43 )   PT: 11.6 sec;   INR: 0.97 ratio         PTT - ( 04 Mar 2021 05:43 )  PTT:30.0 sec     COVID-19 PCR: NotDetec (04 Mar 2021 05:38)      IMAGING:     CT BRAIN (3/4/21): Stable head CT. No acute intracranial hemorrhage, mass effect, or shift.    CT CERVICAL SPINE (3/4/21): No fracture or subluxation.    EEG (3/4/21):  Abnormal EEG study. Risk of focal onset seizures from the right anterior temporal region, structural or functional abnormality in the left temporal area, Left temporal skull defect

## 2021-03-05 NOTE — PROGRESS NOTE ADULT - ASSESSMENT
ASSESSMENT/PLANS: Status epilepticus in the setting of down titration of Xcorpi versus other potential provoked cause.    NEURO:  CT head reviewed; stable from prior  Q4 neurochecks  Valproic acid levels 56-->64 (following load)  Keppra increased to tid, loaded with VPA (and dilantin- DCed per epilepsy)  Continue keppra 1000 tid, valproic acid 500 Q8  Continue VEEG  Activity: [] Mobilize as tolerated [x] Bedrest [] PT [] OT [] PMNR    PULM:   Extubated 3/4.  Incentive spirometry    CV:  SBP goal 100- 160  MAP >65  Baseline EKG    RENAL:  Fluids: NS@75  Replete lytes  CK:     GI:  Diet:   GI prophylaxis [] not indicated [x] PPI [] other:  Bowel regimen [] colace [x] senna [] other:  LFTs wnl    ENDO:   Goal euglycemia (-180)  TSH:   Ammonia wnl    HEME/ONC:  VTE prophylaxis: [] SCDs [x] chemoprophylaxis; lovenox  Anti Xa level    ID: Afebrile  Cultures - follow up    MISC:    SOCIAL/FAMILY:  [x] awaiting [] updated at bedside [] family meeting    CODE STATUS:  [x] Full Code [] DNR [] DNI [] Palliative/Comfort Care    DISPOSITION:  [] ICU [] Stroke Unit [] Floor [x] EMU [] RCU [] PCU    [x] Patient is no longer critically ill    Time spent: 45 critical care minutes    Contact: [phone] ASSESSMENT/PLANS: Status epilepticus in the setting of down titration of Xcorpi versus other potential provoked cause.    NEURO:  CT head reviewed; stable from prior  Q4 neurochecks  Valproic acid levels 56-->64 (following load)  Keppra increased to tid, loaded with VPA (and dilantin- DCed per epilepsy)  Continue keppra 1000 tid, valproic acid 500 Q8  Continue VEEG.  Activity: [] Mobilize as tolerated [x] Bedrest [] PT [] OT [] PMNR    PULM:   Extubated 3/4.  Incentive spirometry  Pulm toilet    CV:  SBP goal 100- 160  MAP >65  Baseline EKG Qtc     RENAL:  Fluids: NS@75  Replete lytes    GI:  Diet: Start TFs  GI prophylaxis [] not indicated [] PPI [] other: not indicated  Bowel regimen [] colace [x] senna [] other:  LFTs wnl    ENDO:   Goal euglycemia (-180)  TSH: peding  Ammonia wnl    HEME/ONC:  VTE prophylaxis: [] SCDs [x] chemoprophylaxis; lovenox  Anti Xa level    ID: Afebrile  Cultures - follow up  Urine Cx NGTD    MISC:    SOCIAL/FAMILY:  [x] awaiting [] updated at bedside [] family meeting    CODE STATUS:  [x] Full Code [] DNR [] DNI [] Palliative/Comfort Care    DISPOSITION:  [] ICU [] Stroke Unit [] Floor [x] EMU [] RCU [] PCU    [x] Patient is no longer critically ill    Time spent: 45 critical care minutes    Contact: [phone] ASSESSMENT/PLANS: Status epilepticus in the setting of medication adjustment (DPH/XCorpi cross titration)    NEURO:  CT head reviewed; stable from prior  Q4 neurochecks  Valproic acid levels 56-->64 (following load)  Keppra increased to tid, loaded with VPA (and dilantin- DCed per epilepsy)  Continue keppra 1000 tid, valproic acid 500 Q8  Continue VEEG.  Activity: [] Mobilize as tolerated [x] Bedrest [] PT [] OT [] PMNR    PULM:   Extubated 3/4.  Incentive spirometry  Pulm toilet    CV:  SBP goal 100- 160  MAP >65  Baseline EKG Qtc     RENAL:  Fluids: NS@75  Replete lytes    GI:  Diet: Start TFs  GI prophylaxis [] not indicated [] PPI [] other: not indicated  Bowel regimen [] colace [x] senna [] other:  LFTs wnl    ENDO:   Goal euglycemia (-180)  TSH: peding  Ammonia wnl    HEME/ONC:  VTE prophylaxis: [] SCDs [x] chemoprophylaxis; lovenox  Anti Xa level    ID: Afebrile  Cultures - follow up  Urine Cx NGTD    SOCIAL/FAMILY:  [x] awaiting [] updated at bedside [] family meeting    CODE STATUS:  [x] Full Code [] DNR [] DNI [] Palliative/Comfort Care    DISPOSITION:  [] ICU [] Stroke Unit [] Floor [x] EMU [] RCU [] PCU    [x] Patient is no longer critically ill    Contact: [phone]

## 2021-03-05 NOTE — SWALLOW BEDSIDE ASSESSMENT ADULT - SWALLOW EVAL: DIAGNOSIS
Pt presents with an oropharyngeal swallow sequence that appears WFL to tolerate a Soft texture diet with no overt s/s of laryngeal penetration or aspiration.

## 2021-03-05 NOTE — SWALLOW BEDSIDE ASSESSMENT ADULT - SLP PERTINENT HISTORY OF CURRENT PROBLEM
70y R-handed M with h/o, HTN, HLD and L. temporal cavernoma s/p resection (2004) c/b focal epilepsy presenting to [facility] ED after being found unresponsive in his bathtub with noted right gaze deviation and right face and arm clonic activity. /p 8mg ativan s/p intubation/mechanical ventilation with etomidate and succinylcholine with Neurology consult for seizures. 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 Follows with Dr. Hoover as outpatient. Per chart review, patient had been on Keppra 750mg bid, dilantin 100mg daily, depakote 500|1000, and had been titrated off XCorpi.

## 2021-03-05 NOTE — PROGRESS NOTE ADULT - SUBJECTIVE AND OBJECTIVE BOX
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 /p 8mg ativan s/p intubation/mechanical ventilation with etomidate and succinylcholine with Neurology consult for seizures. 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 Follows with Dr. Hoover as outpatient. Per chart review, patient had been on Keppra 750mg bid, dilantin 100mg daily, depakote 500|1000, and had been titrated off XCorpi.      *** HIGH RISK OF DVT PRESENT ON ADMISSION due to immobility ***  /  ICU Vital Signs Last 24 Hrs  T(C): 37.1 (05 Mar 2021 03:00), Max: 37.8 (04 Mar 2021 23:00)  T(F): 98.8 (05 Mar 2021 03:00), Max: 100 (04 Mar 2021 23:00)  HR: 79 (05 Mar 2021 06:23) (58 - 92)  BP: 137/73 (05 Mar 2021 06:23) (95/72 - 165/95)  BP(mean): 93 (05 Mar 2021 06:23) (79 - 116)  RR: 16 (05 Mar 2021 06:00) (15 - 24)  SpO2: 96% (05 Mar 2021 06:00) (91% - 100%)      03-04-21 @ 07:01  -  03-05-21 @ 07:00  --------------------------------------------------------  IN: 3012.6 mL / OUT: 1470 mL / NET: 1542.6 mL      Mode: CPAP with PS, FiO2: 40, PEEP: 5, PS: 5, MAP: 9, PIP: 14    chlorhexidine 4% Liquid 1 Application(s) Topical <User Schedule>  enoxaparin Injectable 40 milliGRAM(s) SubCutaneous <User Schedule>  levETIRAcetam  IVPB 1000 milliGRAM(s) IV Intermittent <User Schedule>  pantoprazole  Injectable 40 milliGRAM(s) IV Push daily  polyethylene glycol 3350 17 Gram(s) Oral two times a day  senna Syrup 10 milliLiter(s) Oral at bedtime  sodium chloride 0.9%. 1000 milliLiter(s) (75 mL/Hr) IV Continuous <Continuous>  valproate sodium IVPB 500 milliGRAM(s) IV Intermittent every 8 hours      LABS:  Na: 141 (03-04 @ 20:56), 137 (03-04 @ 05:43)  K: 4.3 (03-04 @ 20:56), 3.6 (03-04 @ 05:43)  Cl: 105 (03-04 @ 20:56), 101 (03-04 @ 05:43)  CO2: 21 (03-04 @ 20:56), 24 (03-04 @ 05:43)  BUN: 10 (03-04 @ 20:56), 8 (03-04 @ 05:43)  Cr: 0.96 (03-04 @ 20:56), 0.77 (03-04 @ 05:43)  Glu: 118(03-04 @ 20:56), 168(03-04 @ 05:43)    Hgb: 13.0 (03-04 @ 20:56), 14.5 (03-04 @ 05:43)  Hct: 39.1 (03-04 @ 20:56), 43.1 (03-04 @ 05:43)  WBC: 9.87 (03-04 @ 20:56), 11.92 (03-04 @ 05:43)  Plt: 154 (03-04 @ 20:56), 190 (03-04 @ 05:43)    INR: 0.97 03-04-21 @ 05:43  PTT: 30.0 03-04-21 @ 05:43      LIVER FUNCTIONS - ( 04 Mar 2021 05:43 )  Alb: 4.1 g/dL / Pro: 6.7 g/dL / ALK PHOS: 77 U/L / ALT: 15 U/L / AST: 20 U/L / GGT: x           ABG - ( 04 Mar 2021 12:16 )  pH, Arterial: 7.53  pH, Blood: x     /  pCO2: 34    /  pO2: 177   / HCO3: 28    / Base Excess: 5.6   /  SaO2: 100                       EXAMINATION:  General: Intubated, on propofol @ 35  HEENT:  MMM  Neuro: No eye opening, Slight upward gaze but conjugate, pupils OU 2mm sluggish, BUE localizes L>R, BLE w/d  Cards:  RRR  Respiratory:  no respiratory distress  Abdomen:  soft  Extremities:  no edema  Skin:  warm/dry HPI: Pt is 70y R-handed M with h/o, HTN, HLD and 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 /p 8mg ativan s/p intubation/mechanical ventilation with etomidate and succinylcholine with Neurology consult for seizures. 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 Follows with Dr. Hoover as outpatient. Per chart review, patient had been on Keppra 750mg bid, dilantin 100mg daily, depakote 500|1000, and had been titrated off XCorpi.      *** HIGH RISK OF DVT PRESENT ON ADMISSION due to immobility ***  /  ICU Vital Signs Last 24 Hrs  T(C): 37.1 (05 Mar 2021 03:00), Max: 37.8 (04 Mar 2021 23:00)  T(F): 98.8 (05 Mar 2021 03:00), Max: 100 (04 Mar 2021 23:00)  HR: 79 (05 Mar 2021 06:23) (58 - 92)  BP: 137/73 (05 Mar 2021 06:23) (95/72 - 165/95)  BP(mean): 93 (05 Mar 2021 06:23) (79 - 116)  RR: 16 (05 Mar 2021 06:00) (15 - 24)  SpO2: 96% (05 Mar 2021 06:00) (91% - 100%)      03-04-21 @ 07:01  -  03-05-21 @ 07:00  --------------------------------------------------------  IN: 3012.6 mL / OUT: 1470 mL / NET: 1542.6 mL      Mode: CPAP with PS, FiO2: 40, PEEP: 5, PS: 5, MAP: 9, PIP: 14    chlorhexidine 4% Liquid 1 Application(s) Topical <User Schedule>  enoxaparin Injectable 40 milliGRAM(s) SubCutaneous <User Schedule>  levETIRAcetam  IVPB 1000 milliGRAM(s) IV Intermittent <User Schedule>  pantoprazole  Injectable 40 milliGRAM(s) IV Push daily  polyethylene glycol 3350 17 Gram(s) Oral two times a day  senna Syrup 10 milliLiter(s) Oral at bedtime  sodium chloride 0.9%. 1000 milliLiter(s) (75 mL/Hr) IV Continuous <Continuous>  valproate sodium IVPB 500 milliGRAM(s) IV Intermittent every 8 hours      LABS:  Na: 141 (03-04 @ 20:56), 137 (03-04 @ 05:43)  K: 4.3 (03-04 @ 20:56), 3.6 (03-04 @ 05:43)  Cl: 105 (03-04 @ 20:56), 101 (03-04 @ 05:43)  CO2: 21 (03-04 @ 20:56), 24 (03-04 @ 05:43)  BUN: 10 (03-04 @ 20:56), 8 (03-04 @ 05:43)  Cr: 0.96 (03-04 @ 20:56), 0.77 (03-04 @ 05:43)  Glu: 118(03-04 @ 20:56), 168(03-04 @ 05:43)    Hgb: 13.0 (03-04 @ 20:56), 14.5 (03-04 @ 05:43)  Hct: 39.1 (03-04 @ 20:56), 43.1 (03-04 @ 05:43)  WBC: 9.87 (03-04 @ 20:56), 11.92 (03-04 @ 05:43)  Plt: 154 (03-04 @ 20:56), 190 (03-04 @ 05:43)    INR: 0.97 03-04-21 @ 05:43  PTT: 30.0 03-04-21 @ 05:43      LIVER FUNCTIONS - ( 04 Mar 2021 05:43 )  Alb: 4.1 g/dL / Pro: 6.7 g/dL / ALK PHOS: 77 U/L / ALT: 15 U/L / AST: 20 U/L / GGT: x           ABG - ( 04 Mar 2021 12:16 )  pH, Arterial: 7.53  pH, Blood: x     /  pCO2: 34    /  pO2: 177   / HCO3: 28    / Base Excess: 5.6   /  SaO2: 100                       EXAMINATION:  General: Comfortable in bed  HEENT:  MMM  Neuro: EO spont, oriented to self only, follows commands, face symmetric, clear voice, full strength   Cards:  RRR  Respiratory:  no respiratory distress  Abdomen:  soft  Extremities:  no edema  Skin:  warm/dry HPI: Pt is 70y R-handed M with h/o, HTN, HLD and 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 /p 8mg ativan s/p intubation/mechanical ventilation with etomidate and succinylcholine with Neurology consult for seizures. 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 Follows with Dr. Hoover as outpatient. Per chart review, patient had been on Keppra 750mg bid, dilantin 100mg daily, depakote 500|1000, and had been titrated off XCorpi.      *** HIGH RISK OF DVT PRESENT ON ADMISSION due to immobility ***    ICU Vital Signs Last 24 Hrs  T(C): 37.1 (05 Mar 2021 03:00), Max: 37.8 (04 Mar 2021 23:00)  T(F): 98.8 (05 Mar 2021 03:00), Max: 100 (04 Mar 2021 23:00)  HR: 79 (05 Mar 2021 06:23) (58 - 92)  BP: 137/73 (05 Mar 2021 06:23) (95/72 - 165/95)  BP(mean): 93 (05 Mar 2021 06:23) (79 - 116)  RR: 16 (05 Mar 2021 06:00) (15 - 24)  SpO2: 96% (05 Mar 2021 06:00) (91% - 100%)      03-04-21 @ 07:01  -  03-05-21 @ 07:00  --------------------------------------------------------  IN: 3012.6 mL / OUT: 1470 mL / NET: 1542.6 mL      Mode: CPAP with PS, FiO2: 40, PEEP: 5, PS: 5, MAP: 9, PIP: 14    chlorhexidine 4% Liquid 1 Application(s) Topical <User Schedule>  enoxaparin Injectable 40 milliGRAM(s) SubCutaneous <User Schedule>  levETIRAcetam  IVPB 1000 milliGRAM(s) IV Intermittent <User Schedule>  pantoprazole  Injectable 40 milliGRAM(s) IV Push daily  polyethylene glycol 3350 17 Gram(s) Oral two times a day  senna Syrup 10 milliLiter(s) Oral at bedtime  sodium chloride 0.9%. 1000 milliLiter(s) (75 mL/Hr) IV Continuous <Continuous>  valproate sodium IVPB 500 milliGRAM(s) IV Intermittent every 8 hours      LABS:  Na: 141 (03-04 @ 20:56), 137 (03-04 @ 05:43)  K: 4.3 (03-04 @ 20:56), 3.6 (03-04 @ 05:43)  Cl: 105 (03-04 @ 20:56), 101 (03-04 @ 05:43)  CO2: 21 (03-04 @ 20:56), 24 (03-04 @ 05:43)  BUN: 10 (03-04 @ 20:56), 8 (03-04 @ 05:43)  Cr: 0.96 (03-04 @ 20:56), 0.77 (03-04 @ 05:43)  Glu: 118(03-04 @ 20:56), 168(03-04 @ 05:43)    Hgb: 13.0 (03-04 @ 20:56), 14.5 (03-04 @ 05:43)  Hct: 39.1 (03-04 @ 20:56), 43.1 (03-04 @ 05:43)  WBC: 9.87 (03-04 @ 20:56), 11.92 (03-04 @ 05:43)  Plt: 154 (03-04 @ 20:56), 190 (03-04 @ 05:43)    INR: 0.97 03-04-21 @ 05:43  PTT: 30.0 03-04-21 @ 05:43      LIVER FUNCTIONS - ( 04 Mar 2021 05:43 )  Alb: 4.1 g/dL / Pro: 6.7 g/dL / ALK PHOS: 77 U/L / ALT: 15 U/L / AST: 20 U/L / GGT: x           ABG - ( 04 Mar 2021 12:16 )  pH, Arterial: 7.53  pH, Blood: x     /  pCO2: 34    /  pO2: 177   / HCO3: 28    / Base Excess: 5.6   /  SaO2: 100             EXAMINATION:  General: Comfortable in bed  HEENT:  MMM  Neuro: EO spont, oriented to self only, follows commands, face symmetric, clear voice, full strength   Cards:  RRR  Respiratory:  no respiratory distress  Abdomen:  soft  Extremities:  no edema  Skin:  warm/dry

## 2021-03-05 NOTE — PROGRESS NOTE ADULT - SUBJECTIVE AND OBJECTIVE BOX
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 Follows with Dr. Hoover as outpatient. Per chart review, patient had been on keppra 750mg bid, dilantin 100mg daily, depakote 500|1000, and had been titrated off xcopri.     Complex partial epilepsy with generalization and with intractable epilepsy, Fatigue,  Memory difficulty         O: EXTUBATED OVERNIGHT       REVIEW OF SYSTEMS: [ ] Unable to Assess due to neurologic exam   [ x] All ROS addressed below are non-contributory, except:  Neuro: [ ] Headache [ ] Back pain [ ] Numbness [ ] Weakness [ ] Ataxia [ ] Dizziness [ ] Aphasia [ ] Dysarthria [ ] Visual disturbance  Resp: [ ] Shortness of breath/dyspnea, [ ] Orthopnea [ ] Cough  CV: [ ] Chest pain [ ] Palpitation [ ] Lightheadedness [ ] Syncope  Renal: [ ] Thirst [ ] Edema  GI: [ ] Nausea [ ] Emesis [ ] Abdominal pain [ ] Constipation [ ] Diarrhea  Hem: [ ] Hematemesis [ ] bright red blood per rectum  ID: [ ] Fever [ ] Chills [ ] Dysuria  ENT: [ ] Rhinorrhea          PAST MEDICAL & SURGICAL HISTORY:  Partial symptomatic epilepsy with complex partial seizures, not intractable, without status epilepticus  Since 2004 s/p L. temporal cavernoma resection    Other hyperlipidemia    Essential hypertension    Cavernoma  S/p resection 2004    Status post craniectomy  2004      FAMILY HISTORY:  No pertinent family history in first degree relatives    MEDICATIONS (HOME):  Home Medications:  escitalopram 10 mg oral tablet: 1 tab(s) orally once a day (31 Oct 2020 11:16)  finasteride 5 mg oral tablet: 1 tab(s) orally once a day (31 Oct 2020 11:16)  losartan 50 mg oral tablet: 1 tab(s) orally once a day (31 Oct 2020 11:16)  simvastatin 10 mg oral tablet: 1 tab(s) orally once a day (at bedtime) (31 Oct 2020 11:16)    MEDICATIONS  (STANDING):  chlorhexidine 0.12% Liquid 15 milliLiter(s) Oral Mucosa every 12 hours  propofol Infusion 20 MICROgram(s)/kG/Min (13.2 mL/Hr) IV Continuous <Continuous>  sodium chloride 0.9%. 1000 milliLiter(s) (125 mL/Hr) IV Continuous <Continuous>    MEDICATIONS  (PRN):    ALLERGIES/INTOLERANCES:  Allergies  No Known Allergies      T(C): 37.1 (03-05-21 @ 03:00), Max: 37.8 (03-04-21 @ 23:00)  HR: 83 (03-05-21 @ 04:00) (58 - 86)  BP: 138/69 (03-05-21 @ 04:00) (95/72 - 166/82)  RR: 21 (03-05-21 @ 04:00) (15 - 24)  SpO2: 95% (03-05-21 @ 04:00) (91% - 100%)  03-04-21 @ 07:01  -  03-05-21 @ 05:43  --------------------------------------------------------  IN: 2837.6 mL / OUT: 1020 mL / NET: 1817.6 mL    chlorhexidine 4% Liquid 1 Application(s) Topical <User Schedule>  enoxaparin Injectable 40 milliGRAM(s) SubCutaneous <User Schedule>  levETIRAcetam  IVPB 1000 milliGRAM(s) IV Intermittent <User Schedule>  pantoprazole  Injectable 40 milliGRAM(s) IV Push daily  polyethylene glycol 3350 17 Gram(s) Oral two times a day  senna Syrup 10 milliLiter(s) Oral at bedtime  sodium chloride 0.9%. 1000 milliLiter(s) IV Continuous <Continuous>  valproate sodium IVPB 500 milliGRAM(s) IV Intermittent every 8 hours  Mode: CPAP with PS, FiO2: 40, PEEP: 5, PS: 5, MAP: 9, PIP: 14             PHYSICAL EXAM:    General: No Acute Distress     Neurological: Awake, alert oriented to person and place  dysarthria,  Following simple  Commands, PERRL, EOMI, Face Symmetrical, Speech Fluent, Moving all extremities at least antigravity   Pulmonary: Clear to Auscultation, No Rales, No Rhonchi, No Wheezes     Cardiovascular: S1, S2, Regular Rate and Rhythm     Gastrointestinal: Soft, Nontender, Nondistended     Extremities: No calf tenderness     Incision:       MEDICATIONS:  Antibiotics:      Neurological:   levETIRAcetam  IVPB 1000 milliGRAM(s) IV Intermittent <User Schedule>  valproate sodium IVPB 500 milliGRAM(s) IV Intermittent every 8 hours    Cardiac:     Pulm:    Heme:   enoxaparin Injectable 40 milliGRAM(s) SubCutaneous <User Schedule>    Other:   chlorhexidine 4% Liquid 1 Application(s) Topical <User Schedule>  pantoprazole  Injectable 40 milliGRAM(s) IV Push daily  polyethylene glycol 3350 17 Gram(s) Oral two times a day  senna Syrup 10 milliLiter(s) Oral at bedtime  sodium chloride 0.9%. 1000 milliLiter(s) IV Continuous <Continuous>       DEVICES: [] Restraints [] JULIO/HMV []LD [] ET tube [] Trach [] Chest Tube [] A-line [] Sullivan [] NGT [] Rectal Tube   Mode: CPAP with PS  FiO2: 40  PEEP: 5  PS: 5  MAP: 9  PIP: 14        ASSESSMENT/PLANS: Status epilepticus in the setting of down titration of Xcopri versus other potential provoked cause.    NEURO:  CT head reviewed; stable from prior  Q4 neuro checks   vEEG no seizures   continue keppra and valproic acid   Activity: [] Mobilize as tolerated [] Bedrest [x] PT [x] OT [] PMNR    PULM: extubated yesterday   RA  IS     CV:  SBP goal 100- 160  MAP >65  Baseline EKG    RENAL:  IVL once feeds are started   Replete lytes  CK levels    GI:  Diet: swallow eval, and advance diet as tolerated   GI prophylaxis [] not indicated [x] PPI [] other:  Bowel regimen [] colace [x] senna [] other:    ENDO:   Goal euglycemia (-180)      HEME/ONC:  VTE prophylaxis: [] SCDs [x] chemoprophylaxis [] hold chemoprophylaxis due to: [] high risk of DVT/PE on admission due to:  Lovenox  Anti Xa level    ID: Afebrile    MISC:    SOCIAL/FAMILY:  [x] awaiting [] updated at bedside [] family meeting    CODE STATUS:  [x] Full Code [] DNR [] DNI [] Palliative/Comfort Care    DISPOSITION:  [] ICU [] Stroke Unit [] Floor [x] EMU [] RCU [] PCU    Not critical   Contact: [phone]

## 2021-03-05 NOTE — SWALLOW BEDSIDE ASSESSMENT ADULT - COMMENTS
Per NSCUNeuro exam: EO spont, oriented to self only, follows commands, face symmetric, clear voice, full strength   Neurology consult for seizures. 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 Follows with Dr. Hoover as outpatient. Per chart review, patient had been on keppra 750mg bid, dilantin 100mg daily, depakote 500|1000, and had been titrated off xcopri. A/P: Focal status epilepticus in the setting of down titration of Xcopri versus other potential provoked cause.  Swallow Hx, seen for bedside swallow eval 10/29/2020: Rx 1) Soft texture diet 2 )1:1 supervision with meals due to cognitive deficits

## 2021-03-06 ENCOUNTER — TRANSCRIPTION ENCOUNTER (OUTPATIENT)
Age: 71
End: 2021-03-06

## 2021-03-06 VITALS
DIASTOLIC BLOOD PRESSURE: 90 MMHG | SYSTOLIC BLOOD PRESSURE: 167 MMHG | OXYGEN SATURATION: 96 % | HEART RATE: 68 BPM | TEMPERATURE: 99 F | RESPIRATION RATE: 17 BRPM

## 2021-03-06 LAB
ANION GAP SERPL CALC-SCNC: 15 MMOL/L — SIGNIFICANT CHANGE UP (ref 5–17)
APPEARANCE UR: ABNORMAL
BACTERIA # UR AUTO: ABNORMAL
BILIRUB UR-MCNC: NEGATIVE — SIGNIFICANT CHANGE UP
BUN SERPL-MCNC: 8 MG/DL — SIGNIFICANT CHANGE UP (ref 7–23)
CALCIUM SERPL-MCNC: 8.5 MG/DL — SIGNIFICANT CHANGE UP (ref 8.4–10.5)
CHLORIDE SERPL-SCNC: 102 MMOL/L — SIGNIFICANT CHANGE UP (ref 96–108)
CO2 SERPL-SCNC: 25 MMOL/L — SIGNIFICANT CHANGE UP (ref 22–31)
COLOR SPEC: SIGNIFICANT CHANGE UP
CREAT SERPL-MCNC: 0.78 MG/DL — SIGNIFICANT CHANGE UP (ref 0.5–1.3)
DIFF PNL FLD: ABNORMAL
EPI CELLS # UR: 7 /HPF — HIGH
GLUCOSE SERPL-MCNC: 88 MG/DL — SIGNIFICANT CHANGE UP (ref 70–99)
GLUCOSE UR QL: NEGATIVE — SIGNIFICANT CHANGE UP
HCT VFR BLD CALC: 38.1 % — LOW (ref 39–50)
HGB BLD-MCNC: 13 G/DL — SIGNIFICANT CHANGE UP (ref 13–17)
HYALINE CASTS # UR AUTO: 3 /LPF — SIGNIFICANT CHANGE UP (ref 0–7)
KETONES UR-MCNC: SIGNIFICANT CHANGE UP
LEUKOCYTE ESTERASE UR-ACNC: ABNORMAL
MCHC RBC-ENTMCNC: 30.9 PG — SIGNIFICANT CHANGE UP (ref 27–34)
MCHC RBC-ENTMCNC: 34.1 GM/DL — SIGNIFICANT CHANGE UP (ref 32–36)
MCV RBC AUTO: 90.5 FL — SIGNIFICANT CHANGE UP (ref 80–100)
NITRITE UR-MCNC: NEGATIVE — SIGNIFICANT CHANGE UP
NRBC # BLD: 0 /100 WBCS — SIGNIFICANT CHANGE UP (ref 0–0)
PH UR: 7.5 — SIGNIFICANT CHANGE UP (ref 5–8)
PLATELET # BLD AUTO: 153 K/UL — SIGNIFICANT CHANGE UP (ref 150–400)
POTASSIUM SERPL-MCNC: 3.7 MMOL/L — SIGNIFICANT CHANGE UP (ref 3.5–5.3)
POTASSIUM SERPL-SCNC: 3.7 MMOL/L — SIGNIFICANT CHANGE UP (ref 3.5–5.3)
PROT UR-MCNC: 100 — SIGNIFICANT CHANGE UP
RBC # BLD: 4.21 M/UL — SIGNIFICANT CHANGE UP (ref 4.2–5.8)
RBC # FLD: 13.1 % — SIGNIFICANT CHANGE UP (ref 10.3–14.5)
RBC CASTS # UR COMP ASSIST: 1588 /HPF — HIGH (ref 0–4)
SODIUM SERPL-SCNC: 142 MMOL/L — SIGNIFICANT CHANGE UP (ref 135–145)
SP GR SPEC: 1.01 — SIGNIFICANT CHANGE UP (ref 1.01–1.02)
UROBILINOGEN FLD QL: NEGATIVE — SIGNIFICANT CHANGE UP
WBC # BLD: 8.05 K/UL — SIGNIFICANT CHANGE UP (ref 3.8–10.5)
WBC # FLD AUTO: 8.05 K/UL — SIGNIFICANT CHANGE UP (ref 3.8–10.5)
WBC UR QL: 11 /HPF — HIGH (ref 0–5)

## 2021-03-06 PROCEDURE — 95718 EEG PHYS/QHP 2-12 HR W/VEEG: CPT

## 2021-03-06 PROCEDURE — 99233 SBSQ HOSP IP/OBS HIGH 50: CPT

## 2021-03-06 RX ORDER — LEVETIRACETAM 250 MG/1
2 TABLET, FILM COATED ORAL
Qty: 0 | Refills: 0 | DISCHARGE
Start: 2021-03-06

## 2021-03-06 RX ORDER — DIVALPROEX SODIUM 500 MG/1
3 TABLET, DELAYED RELEASE ORAL
Qty: 180 | Refills: 0
Start: 2021-03-06 | End: 2021-04-04

## 2021-03-06 RX ORDER — DIVALPROEX SODIUM 500 MG/1
750 TABLET, DELAYED RELEASE ORAL
Refills: 0 | Status: DISCONTINUED | OUTPATIENT
Start: 2021-03-06 | End: 2021-03-06

## 2021-03-06 RX ORDER — LEVETIRACETAM 250 MG/1
1500 TABLET, FILM COATED ORAL
Refills: 0 | Status: DISCONTINUED | OUTPATIENT
Start: 2021-03-06 | End: 2021-03-06

## 2021-03-06 RX ORDER — DIVALPROEX SODIUM 500 MG/1
500 TABLET, DELAYED RELEASE ORAL THREE TIMES A DAY
Refills: 0 | Status: DISCONTINUED | OUTPATIENT
Start: 2021-03-06 | End: 2021-03-06

## 2021-03-06 RX ADMIN — Medication 27.5 MILLIGRAM(S): at 05:17

## 2021-03-06 RX ADMIN — LEVETIRACETAM 400 MILLIGRAM(S): 250 TABLET, FILM COATED ORAL at 01:35

## 2021-03-06 RX ADMIN — LOSARTAN POTASSIUM 50 MILLIGRAM(S): 100 TABLET, FILM COATED ORAL at 05:17

## 2021-03-06 RX ADMIN — FINASTERIDE 5 MILLIGRAM(S): 5 TABLET, FILM COATED ORAL at 12:18

## 2021-03-06 RX ADMIN — LEVETIRACETAM 400 MILLIGRAM(S): 250 TABLET, FILM COATED ORAL at 09:07

## 2021-03-06 NOTE — DISCHARGE NOTE PROVIDER - CARE PROVIDER_API CALL
Jacinda Hoover)  EEGEpilepsy; Neurology  [address], [address]  [place] [address]  Phone: [phone]  Fax: ()-  Follow Up Time: 1 week

## 2021-03-06 NOTE — DISCHARGE NOTE PROVIDER - NSDCCPCAREPLAN_GEN_ALL_CORE_FT
PRINCIPAL DISCHARGE DIAGNOSIS  Diagnosis: Status epilepticus  Assessment and Plan of Treatment: Plan:  Outpatient epilepsy follow up with Dr. Hoover in 1 week.  Take all medications as prescribed.  Outpatient physical therapy  Soft texture diet  Do not drive, operate heavy machinery,  high places/near ledges, or swim/bathe unsupervised.      SECONDARY DISCHARGE DIAGNOSES  Diagnosis: Acute respiratory failure with hypoxia  Assessment and Plan of Treatment:

## 2021-03-06 NOTE — PROGRESS NOTE ADULT - ASSESSMENT
Assessment: 70y R-handed M with h/o, HTN, HLD and L. temporal cavernoma s/p resection (2004) c/b focal epilepsy presenting to [facility] ED after being found unresponsive in his bathtub with noted right gaze deviation and right face and arm clonic activity s/p 8mg ativan s/p intubation/mechanical ventilation with etomidate and succinylcholine with Neurology consult for seizures. Patient was extubated on 3/4 and transferred to the EMU on 3/5. Patient neurologically stable on current physical exam.     Impression: Focal status epilepticus in the setting of down titration of Xcopri versus other potential provoked cause.    NEURO:  CT head reviewed; stable from prior  Q4 neuro checks   vEEG no seizures   Keppra and valproic acid changed to PO  Activity: [] Mobilize as tolerated [] Bedrest [x] PT [x] OT [] PMNR  Soft diet     PULM:   stable on room air, IS     CV:  Losartan restarted overnight  SBP goal 100- 160    RENAL/:  Replete lytes  CK levels  Finasteride restarted overnight    GI:  Diet: swallow eval, started on soft diet  PPX ot indicated [x] PPI [] other:  Bowel regimen [] colace [x] senna [] other:    ENDO:   Goal euglycemia (-180)    HEME/ONC:  VTE prophylaxis: [] SCDs [x] chemoprophylaxis [] hold chemoprophylaxis due to: [] high risk of DVT/PE on admission due to:  Lovenox  Anti Xa level    ID: Afebrile    Disposition: Discharge home today with outpatient physical therapy    CORE MEASURES:        AED levels [] Sent [] Pending [x] Resulted     LFTs [x] normal [] elevated      Plan and education provided to [x] patient []family at bedside [] awaiting for family     Seizure Semiology  [] Tonic clonic  [] Clonic  [] Tonic  [] Unresponsive  [x] Focal with impaired awareness  [] Focal without impaired awareness    Obtain screening lower extremity venous ultrasound in patients who meet 1 or more of the following criteria as patient is high risk for DVT/PE on admission:   [] History of DVT/PE  []Hypercoagulable states (Factor V Leiden, Cancer, OCP, etc. )  []Prolonged immobility (hemiplegia/hemiparesis/post operative or any other extended immobilization)  [] Transferred from outside facility (Rehab or Long term care)    Case discussed with epilepsy attending, Dr. Rendon

## 2021-03-06 NOTE — PHYSICAL THERAPY INITIAL EVALUATION ADULT - PRECAUTIONS/LIMITATIONS, REHAB EVAL
Abnormal EEG study. Risk of focal onset seizures from the right anterior temporal region, structural or functional abnormality in the left temporal area, Left temporal skull defect./fall precautions/seizure precautions

## 2021-03-06 NOTE — EEG REPORT - NS EEG TEXT BOX
[facility]   COMPREHENSIVE EPILEPSY CENTER   REPORT OF LONG-TERM VIDEO EEG     [facility]: [address], [place] [address], Ph#: [phone]  [facility]: [address] [place] [address], Ph#: [phone]  [facility]: [address] [place] [address], Ph#: [phone]    Patient Name: MAURO NUNEZ  Age and : 70y ([birth year])  MRN #: [id number]  Location: [address] [place] [address]  Referring Physician: Kiko    Start Time/Date: 08:00 21  End Time/Date: 09:30 on 21  Duration: 24H    _____________________________________________________________  STUDY INFORMATION    EEG Recording Technique:  The patient underwent continuous Video-EEG monitoring, using Telemetry System hardware on the XLTek Digital System. EEG and video data were stored on a computer hard drive with important events saved in digital archive files. The material was reviewed by a physician (electroencephalographer / epileptologist) on a daily basis. Alejandro and seizure detection algorithms were utilized and reviewed. An EEG Technician attended to the patient, and was available throughout daytime work hours.  The epilepsy center neurologist was available in person or on call 24-hours per day.    EEG Placement and Labeling of Electrodes:  The EEG was performed utilizing 20 channel referential EEG connections (coronal over temporal over parasagittal montage) using all standard 10-20 electrode placements with EKG, with additional electrodes placed in the inferior temporal region using the modified 10-10 montage electrode placements for elective admissions, or if deemed necessary. Recording was at a sampling rate of 256 samples per second per channel. Time synchronized digital video recording was done simultaneously with EEG recording. A low light infrared camera was used for low light recording.     _____________________________________________________________  HISTORY    PERTINENT MEDICATION:  levETIRAcetam  IVPB 1000 milliGRAM(s) IV Intermittent <User Schedule>  valproate sodium IVPB 500 milliGRAM(s) IV Intermittent every 8 hours    _____________________________________________________________  STUDY INTERPRETATION    Findings: The background was continuous, spontaneously variable and reactive. During wakefulness, the posterior dominant rhythm consisted of well-modulated 9 Hz activity, with amplitude to 30 uV, that attenuated to eye opening.      Background Slowing:  No generalized background slowing was present.    Focal Slowing:   Intermittent polymorphic delta slowing in the left temporal region.    Sleep Background:  Drowsiness was characterized by fragmentation, attenuation, and slowing of the background activity.    Sleep was characterized by the presence of vertex waves, symmetric sleep spindles and K-complexes.    Other Non-Epileptiform Findings:  Breach effect max in left frontotemporal region characterized by higher amplitude, sharply contoured waves    Interictal Epileptiform Activity:   Rare F7 max sharp waves    Events:  Clinical events: None recorded.  Seizures: None recorded.    Activation Procedures:   Hyperventilation was not performed.    Photic stimulation was not performed.     Artifacts:  Intermittent myogenic and movement artifacts were noted.    ECG:  The heart rate on single channel ECG was predominantly between 80-90 BPM.    _____________________________________________________________  EEG SUMMARY/CLASSIFICATION   Abnormal EEG in the awake, drowsy and asleep states.  - Rare F7 max sharp waves  - Intermittent polymorphic delta slowing in the left temporal region.  - Breach effect max in left frontotemporal region   _____________________________________________________________  EEG IMPRESSION/CLINICAL CORRELATE    Abnormal EEG study.  Risk of focal onset seizures from the right anterior temporal region  Structural or functional abnormality in the left temporal area  Left temporal skull defect    Jaswinder Rendon MD  EEG/Epilepsy Attending    Reading Room: [phone]  On Call Service After Hours: [phone]     [facility]   COMPREHENSIVE EPILEPSY CENTER   REPORT OF LONG-TERM VIDEO EEG     [facility]: [address], [place] [address], Ph#: [phone]  [facility]: [address] [place] [address], Ph#: [phone]  [facility]: [address] [place] [address], Ph#: [phone]    Patient Name: MAURO NUNEZ  Age and : 70y ([birth year])  MRN #: [id number]  Location: [address] [place] [address]  Referring Physician: Kiko    Start Time/Date: 08:00 21  End Time/Date: 11:00 on 21  Duration: 27H    _____________________________________________________________  STUDY INFORMATION    EEG Recording Technique:  The patient underwent continuous Video-EEG monitoring, using Telemetry System hardware on the XLTek Digital System. EEG and video data were stored on a computer hard drive with important events saved in digital archive files. The material was reviewed by a physician (electroencephalographer / epileptologist) on a daily basis. Alejandro and seizure detection algorithms were utilized and reviewed. An EEG Technician attended to the patient, and was available throughout daytime work hours.  The epilepsy center neurologist was available in person or on call 24-hours per day.    EEG Placement and Labeling of Electrodes:  The EEG was performed utilizing 20 channel referential EEG connections (coronal over temporal over parasagittal montage) using all standard 10-20 electrode placements with EKG, with additional electrodes placed in the inferior temporal region using the modified 10-10 montage electrode placements for elective admissions, or if deemed necessary. Recording was at a sampling rate of 256 samples per second per channel. Time synchronized digital video recording was done simultaneously with EEG recording. A low light infrared camera was used for low light recording.     _____________________________________________________________  HISTORY    PERTINENT MEDICATION:  levETIRAcetam  IVPB 1000 milliGRAM(s) IV Intermittent <User Schedule>  valproate sodium IVPB 500 milliGRAM(s) IV Intermittent every 8 hours    _____________________________________________________________  STUDY INTERPRETATION    Findings: The background was continuous, spontaneously variable and reactive. During wakefulness, the posterior dominant rhythm consisted of well-modulated 9 Hz activity, with amplitude to 30 uV, that attenuated to eye opening.      Background Slowing:  No generalized background slowing was present.    Focal Slowing:   Intermittent polymorphic delta slowing in the left temporal region.    Sleep Background:  Drowsiness was characterized by fragmentation, attenuation, and slowing of the background activity.    Sleep was characterized by the presence of vertex waves, symmetric sleep spindles and K-complexes.    Other Non-Epileptiform Findings:  Breach effect max in left frontotemporal region characterized by higher amplitude, sharply contoured waves    Interictal Epileptiform Activity:   Rare F7 max sharp waves    Events:  Clinical events: None recorded.  Seizures: None recorded.    Activation Procedures:   Hyperventilation was not performed.    Photic stimulation was not performed.     Artifacts:  Intermittent myogenic and movement artifacts were noted.    ECG:  The heart rate on single channel ECG was predominantly between 80-90 BPM.    _____________________________________________________________  EEG SUMMARY/CLASSIFICATION   Abnormal EEG in the awake, drowsy and asleep states.  - Rare F7 max sharp waves  - Intermittent polymorphic delta slowing in the left temporal region.  - Breach effect max in left frontotemporal region   _____________________________________________________________  EEG IMPRESSION/CLINICAL CORRELATE    Abnormal EEG study.  Risk of focal onset seizures from the right anterior temporal region  Structural or functional abnormality in the left temporal area  Left temporal skull defect    Jaswinder Rendon MD  EEG/Epilepsy Attending    Reading Room: [phone]  On Call Service After Hours: [phone]

## 2021-03-06 NOTE — DISCHARGE NOTE PROVIDER - NSRESEARCHGRANT_OVERRIDEREC_GEN_A_CORE
IMPROVE-DD Application Not Available Manual Repair Warning Statement: We plan on removing the manually selected variable below in favor of our much easier automatic structured text blocks found in the previous tab. We decided to do this to help make the flow better and give you the full power of structured data. Manual selection is never going to be ideal in our platform and I would encourage you to avoid using manual selection from this point on, especially since I will be sunsetting this feature. It is important that you do one of two things with the customized text below. First, you can save all of the text in a word file so you can have it for future reference. Second, transfer the text to the appropriate area in the Library tab. Lastly, if there is a flap or graft type which we do not have you need to let us know right away so I can add it in before the variable is hidden. No need to panic, we plan to give you roughly 6 months to make the change.

## 2021-03-06 NOTE — PHYSICAL THERAPY INITIAL EVALUATION ADULT - ADDITIONAL COMMENTS
PTA pt lived in pvt home with wife, 2x4 steps to enter +HR, no steps inside, fully independent without AD.

## 2021-03-06 NOTE — PHYSICAL THERAPY INITIAL EVALUATION ADULT - LEVEL OF INDEPENDENCE, REHAB EVAL
Subjective:      Cam Mccarthy is a 21 y.o. female who presents today for std testing. STD testing: she states she is having vaginal discharge that started last week, discharge is white with an odor, she requests STD testing because she had unprotected sex. She denies fevers, abdominal pain. She states she started her menses yesterday. There are no active problems to display for this patient. Current Outpatient Prescriptions   Medication Sig Dispense Refill    norethindrone-ethinyl estradiol (JUNEL 1/20, 21,) 1-20 mg-mcg tab Take 1 Tab by mouth daily. 3 Package 3     No Known Allergies     Review of Systems    A comprehensive review of systems was negative except for that written in the HPI. Objective:     Visit Vitals    /67 (BP 1 Location: Right arm, BP Patient Position: Sitting)    Pulse 70    Temp 98.4 °F (36.9 °C) (Oral)    Resp 18    Ht 5' 7\" (1.702 m)    Wt 157 lb (71.2 kg)    LMP 09/11/2018    SpO2 99%    BMI 24.59 kg/m2     General appearance: alert, cooperative, no distress, appears stated age  Head: Normocephalic, without obvious abnormality, atraumatic  Eyes: negative  Back: symmetric, no curvature. ROM normal. No CVA tenderness. Lungs: clear to auscultation bilaterally  Heart: regular rate and rhythm, S1, S2 normal, no murmur, click, rub or gallop  Pelvic: External genitalia normal, vagina normal with white discharge, whiff test negative  Extremities: extremities normal, atraumatic, no cyanosis or edema  Neurologic: Grossly normal  Nursing note and vitals reviewed  Assessment/Plan:       ICD-10-CM ICD-9-CM    1. Vaginal discharge N89.8 623.5 NUSWAB VAGINITIS PLUS   2. Screen for STD (sexually transmitted disease) Z11.3 V74.5 HIV 1/2 AG/AB, 4TH GENERATION,W RFLX CONFIRM      RPR W/REFLEX TITER AND TREPONEMA ABS   3.  Unprotected sex Z72.51 V69.2 AMB POC URINE PREGNANCY TEST, VISUAL COLOR COMPARISON     Follow-up Disposition:  Return if symptoms worsen or fail to improve. Advised her to call back or return to office if symptoms worsen/change/persist.  Discussed expected course/resolution/complications of diagnosis in detail with patient. Medication risks/benefits/costs/interactions/alternatives discussed with patient. She was given an after visit summary which includes diagnoses, current medications, & vitals. She expressed understanding with the diagnosis and plan. independent

## 2021-03-06 NOTE — PROGRESS NOTE ADULT - SUBJECTIVE AND OBJECTIVE BOX
SUBJECTIVE: Patient seen and examined at the bedside. Has no acute complaints. Stated that he would like to go home and that he walked with physical therapy. He was advised to use a walker, but refuses stating that he would not like to use it when ambulating, but will continue physical therapy outpatient.    INTERVAL HISTORY:    PAST MEDICAL & SURGICAL HISTORY:  Partial symptomatic epilepsy with complex partial seizures, not intractable, without status epilepticus  Since  s/p L. temporal cavernoma resection    Other hyperlipidemia    Essential hypertension    Cavernoma  S/p resection     Status post craniectomy        FAMILY HISTORY:  No pertinent family history in first degree relatives      SOCIAL HISTORY:   T/E/D:   Occupation:   Lives with:     MEDICATIONS (HOME):  Home Medications:  escitalopram 10 mg oral tablet: 1 tab(s) orally once a day (31 Oct 2020 11:16)  finasteride 5 mg oral tablet: 1 tab(s) orally once a day (31 Oct 2020 11:16)  losartan 50 mg oral tablet: 1 tab(s) orally once a day (31 Oct 2020 11:16)  simvastatin 10 mg oral tablet: 1 tab(s) orally once a day (at bedtime) (31 Oct 2020 11:16)    MEDICATIONS  (STANDING):  enoxaparin Injectable 40 milliGRAM(s) SubCutaneous <User Schedule>  finasteride 5 milliGRAM(s) Oral daily  levETIRAcetam  IVPB 1000 milliGRAM(s) IV Intermittent <User Schedule>  losartan 50 milliGRAM(s) Oral daily  polyethylene glycol 3350 17 Gram(s) Oral two times a day  senna Syrup 10 milliLiter(s) Oral at bedtime  valproate sodium IVPB 500 milliGRAM(s) IV Intermittent every 8 hours    MEDICATIONS  (PRN):  acetaminophen   Tablet .. 650 milliGRAM(s) Oral every 6 hours PRN Temp greater or equal to 38C (100.4F), Mild Pain (1 - 3)    ALLERGIES/INTOLERANCES:  Allergies  No Known Allergies    Intolerances    VITALS & EXAMINATION:  Vital Signs Last 24 Hrs  T(C): 37.2 (06 Mar 2021 07:26), Max: 37.6 (06 Mar 2021 01:26)  T(F): 98.9 (06 Mar 2021 07:26), Max: 99.6 (06 Mar 2021 01:26)  HR: 78 (06 Mar 2021 09:03) (73 - 91)  BP: 170/86 (06 Mar 2021 07:26) (133/73 - 181/92)  BP(mean): 104 (05 Mar 2021 15:00) (90 - 104)  RR: 17 (06 Mar 2021 07:26) (17 - 20)  SpO2: 98% (06 Mar 2021 07:26) (93% - 100%)    General: no acute distress, laying in bed, EEG attached  MS: awake, alert, oriented to situation, follows commands  Language: speech is clear with good fluency and comprehension  CN: EOMI, no facial asymmetry b/l  Motor: normal muscle bulk, moves all extremities symmetrically and against gravity  Sensory: deferred  Coordination: deferred  Reflexes: deferred  Gait: deferred    LABORATORY:  CBC                       13.0   8.05  )-----------( 153      ( 06 Mar 2021 05:37 )             38.1     Chem 03-06    142  |  102  |  8   ----------------------------<  88  3.7   |  25  |  0.78    Ca    8.5      06 Mar 2021 05:37  Phos  3.4     03-04  Mg     1.6     03-04      LFTs   Coagulopathy   Lipid Panel   A1c   Cardiac enzymes     U/A Urinalysis Basic - ( 06 Mar 2021 00:11 )    Color: BROWN / Appearance: Slightly Turbid / S.013 / pH: x  Gluc: x / Ketone: Trace  / Bili: Negative / Urobili: Negative   Blood: x / Protein: 100 / Nitrite: Negative   Leuk Esterase: Large / RBC: 1588 /hpf / WBC 11 /HPF   Sq Epi: x / Non Sq Epi: 7 /hpf / Bacteria: Few      CSF  Immunological  Other    STUDIES & IMAGING:  Studies (EKG, EEG, EMG, etc):     Radiology (XR, CT, MR, U/S, TTE/SAHIL):

## 2021-03-06 NOTE — PHYSICAL THERAPY INITIAL EVALUATION ADULT - PERTINENT HX OF CURRENT PROBLEM, REHAB EVAL
Pt is a 70y R-handed M with h/o, HTN, HLD and L. temporal cavernoma s/p resection (2004) c/b focal epilepsy presenting to [facility] ED after being found unresponsive in his bathtub with noted right gaze deviation and right face and arm clonic activity. s/p 8mg ativan s/p intubation/mechanical ventilation with etomidate and succinylcholine with Neurology consult for seizures. Extubated 3/4/21.

## 2021-03-06 NOTE — DISCHARGE NOTE PROVIDER - HOSPITAL COURSE
HPI: 70y R-handed M with h/o, HTN, HLD and L. temporal cavernoma s/p resection (2004) c/b focal epilepsy presenting to [facility] ED after being found unresponsive in his bathtub with noted right gaze deviation and right face and arm clonic activity s/p 8mg ativan s/p intubation/mechanical ventilation with etomidate and succinylcholine with Neurology consult for seizures. 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 Follows with Dr. Hoover as outpatient. Per chart review, patient had been on keppra 750mg bid, dilantin 100mg daily, depakote 500|1000, and had been titrated off xcopri.     CT Head and C-spine No Cont (03.04.21)  IMPRESSION:  CT BRAIN: Stable head CT. No acute intracranial hemorrhage, mass effect, or shift.  CT CERVICAL SPINE: No fracture or subluxation.    EEG SUMMARY/CLASSIFICATION (03.05.21)   Abnormal EEG in the awake, drowsy and asleep states.  - Rare F7 max sharp waves  - Intermittent polymorphic delta slowing in the left temporal region.  - Breach effect max in left frontotemporal region   _____________________________________________________________  EEG IMPRESSION/CLINICAL CORRELATE    Abnormal EEG study.  Risk of focal onset seizures from the right anterior temporal region  Structural or functional abnormality in the left temporal area  Left temporal skull defect    The patient was admitted to the NSCU. The patient was placed on continuous VEEG monitoring which showed no seizures. Patient's AED levels were monitored. He was titrated off sedation, extubated, and subsequently transferred to the EMU. Patient's AED's were adjusted to keppra 1500 mg BID and depakote  mg BID. Patient neurologically stable for discharge.   PT evaluated the pt and decided their disposition to be home with outpatient PT, while a swallow eval showed the pt to tolerate a soft texture diet.     Impression: Focal status epilepticus in setting of medication adjustment    Plan:  Outpatient epilepsy follow up with Dr. Hoover in 1 week.  Take all medications as prescribed.  Do not drive, operate heavy machinery,  high places/near ledges, or swim/bathe unsupervised.

## 2021-03-06 NOTE — PROGRESS NOTE ADULT - ATTENDING COMMENTS
Seen with resident.  Agree with above.  No driving (needs one year seizure free).
Accepted to [facility] by Dr. Hoover.

## 2021-03-06 NOTE — DISCHARGE NOTE NURSING/CASE MANAGEMENT/SOCIAL WORK - PATIENT PORTAL LINK FT
You can access the FollowMyHealth Patient Portal offered by [facility] by registering at the following website: [email or web address]. By joining [employer]’s FollowMyHealth portal, you will also be able to view your health information using other applications (apps) compatible with our system.

## 2021-03-06 NOTE — PHYSICAL THERAPY INITIAL EVALUATION ADULT - PLANNED THERAPY INTERVENTIONS, PT EVAL
Stair Training: Goal: Improve to 10 steps supervision with single rail, step to pattern by 4 weeks./gait training/transfer training

## 2021-03-06 NOTE — DISCHARGE NOTE PROVIDER - NSDCMRMEDTOKEN_GEN_ALL_CORE_FT
Depakote 250 mg oral delayed release tablet: 3 tab(s) orally 2 times a day   escitalopram 10 mg oral tablet: 1 tab(s) orally once a day  finasteride 5 mg oral tablet: 1 tab(s) orally once a day  levETIRAcetam 750 mg oral tablet: 2 tab(s) orally 2 times a day  losartan 50 mg oral tablet: 1 tab(s) orally once a day  Outpatient Physical Therapy: Outpatient PT  simvastatin 10 mg oral tablet: 1 tab(s) orally once a day (at bedtime)

## 2021-03-06 NOTE — DISCHARGE NOTE PROVIDER - NSDCFUSCHEDAPPT_GEN_ALL_CORE_FT
MAURO NUNEZ ; 03/11/2021 ; NPP [facility]  MAURO NUNEZ ; 03/17/2021 ; NPP [facility] [address] [facility]  MAURO NUNEZ ; 03/31/2021 ; NP Neuro [facility]

## 2021-03-07 ENCOUNTER — TRANSCRIPTION ENCOUNTER (OUTPATIENT)
Age: 71
End: 2021-03-07

## 2021-03-09 ENCOUNTER — NON-APPOINTMENT (OUTPATIENT)
Age: 71
End: 2021-03-09

## 2021-03-09 DIAGNOSIS — E55.9 VITAMIN D DEFICIENCY, UNSPECIFIED: ICD-10-CM

## 2021-03-11 ENCOUNTER — APPOINTMENT (OUTPATIENT)
Dept: INTERNAL MEDICINE | Facility: CLINIC | Age: 71
End: 2021-03-11
Payer: MEDICARE

## 2021-03-11 PROCEDURE — 36415 COLL VENOUS BLD VENIPUNCTURE: CPT

## 2021-03-12 ENCOUNTER — APPOINTMENT (OUTPATIENT)
Dept: NEUROLOGY | Facility: CLINIC | Age: 71
End: 2021-03-12
Payer: MEDICARE

## 2021-03-12 VITALS — HEART RATE: 55 BPM | SYSTOLIC BLOOD PRESSURE: 154 MMHG | DIASTOLIC BLOOD PRESSURE: 77 MMHG

## 2021-03-12 VITALS
HEART RATE: 56 BPM | SYSTOLIC BLOOD PRESSURE: 166 MMHG | WEIGHT: 210 LBS | HEIGHT: 74 IN | BODY MASS INDEX: 26.95 KG/M2 | DIASTOLIC BLOOD PRESSURE: 76 MMHG

## 2021-03-12 VITALS — TEMPERATURE: 97.1 F

## 2021-03-12 LAB
BASOPHILS # BLD AUTO: 0.06 K/UL
BASOPHILS NFR BLD AUTO: 1 %
CHOLEST SERPL-MCNC: 179 MG/DL
EOSINOPHIL # BLD AUTO: 0.03 K/UL
EOSINOPHIL NFR BLD AUTO: 0.5 %
ESTIMATED AVERAGE GLUCOSE: 108 MG/DL
HBA1C MFR BLD HPLC: 5.4 %
HCT VFR BLD CALC: 38.3 %
HDLC SERPL-MCNC: 41 MG/DL
HGB BLD-MCNC: 12.8 G/DL
IMM GRANULOCYTES NFR BLD AUTO: 0.7 %
LDLC SERPL CALC-MCNC: 116 MG/DL
LDLC SERPL DIRECT ASSAY-MCNC: 115 MG/DL
LEVETIRACETAM SERPL-MCNC: 58.3 UG/ML — HIGH (ref 10–40)
LYMPHOCYTES # BLD AUTO: 2.3 K/UL
LYMPHOCYTES NFR BLD AUTO: 38.7 %
MAN DIFF?: NORMAL
MCHC RBC-ENTMCNC: 30.5 PG
MCHC RBC-ENTMCNC: 33.4 GM/DL
MCV RBC AUTO: 91.4 FL
MONOCYTES # BLD AUTO: 0.63 K/UL
MONOCYTES NFR BLD AUTO: 10.6 %
NEUTROPHILS # BLD AUTO: 2.88 K/UL
NEUTROPHILS NFR BLD AUTO: 48.5 %
NONHDLC SERPL-MCNC: 138 MG/DL
PLATELET # BLD AUTO: 197 K/UL
PSA SERPL-MCNC: 4.17 NG/ML
RBC # BLD: 4.19 M/UL
RBC # FLD: 13.2 %
SAVE SPECIMEN: NORMAL
TRIGL SERPL-MCNC: 112 MG/DL
WBC # FLD AUTO: 5.94 K/UL

## 2021-03-12 PROCEDURE — 99214 OFFICE O/P EST MOD 30 MIN: CPT

## 2021-03-12 RX ORDER — CLONAZEPAM 0.25 MG/1
0.25 TABLET, ORALLY DISINTEGRATING ORAL
Qty: 90 | Refills: 0 | Status: ACTIVE | COMMUNITY
Start: 2021-03-12 | End: 1900-01-01

## 2021-03-13 LAB
25(OH)D3 SERPL-MCNC: 43.8 NG/ML
ALBUMIN SERPL ELPH-MCNC: 3.7 G/DL
ALP BLD-CCNC: 55 U/L
ALT SERPL-CCNC: 16 U/L
ANION GAP SERPL CALC-SCNC: 12 MMOL/L
AST SERPL-CCNC: 21 U/L
BILIRUB SERPL-MCNC: 0.3 MG/DL
BUN SERPL-MCNC: 11 MG/DL
CALCIUM SERPL-MCNC: 8.5 MG/DL
CHLORIDE SERPL-SCNC: 103 MMOL/L
CO2 SERPL-SCNC: 28 MMOL/L
CREAT SERPL-MCNC: 0.75 MG/DL
GLUCOSE SERPL-MCNC: 95 MG/DL
POTASSIUM SERPL-SCNC: 3.6 MMOL/L
PROT SERPL-MCNC: 5.9 G/DL
SODIUM SERPL-SCNC: 142 MMOL/L

## 2021-03-14 ENCOUNTER — TRANSCRIPTION ENCOUNTER (OUTPATIENT)
Age: 71
End: 2021-03-14

## 2021-03-15 ENCOUNTER — TRANSCRIPTION ENCOUNTER (OUTPATIENT)
Age: 71
End: 2021-03-15

## 2021-03-15 NOTE — ASSESSMENT
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 [place] keppra to [id number] bid\par 2. Continue VPA ER  750 bid\par 3.OFF Dilantin 100\par 4. OFF XCOPRI\par 5. interested to start modified Atkins diet ( agree)\par 6. clonazepam ODT 0.25 mg prn auras ( nausea/abdominal rising sensation)\par \par

## 2021-03-15 NOTE — HISTORY OF PRESENT ILLNESS
[FreeTextEntry1] : *** 03/12/2021 ***\par \par MAURO NUNEZ is here for follow up.\par \par vpa 750 bid\par keppra 1500 bid\par \par He was recently admitted in the hospital and was intubated for a probable focal status epilepticus.\par \par He was tapering xcopri due to mood SE(?).\par Now, he is only on keppra and vpa\par \par *** 01/27/2021 *** \par \par MAURO NUNEZ is seen for follow up. continue to be depressed but no seizures. Tolerated well Xcopri\par \par *** 12/02/2020 *** \par \par MAURO NUNEZ is here for follow up.\par no seizures since dc\par continue to have depressed mood( medication SE?)\par \par Xcopri was approved but did not started as of yet due to high copay for increased dose ( at )\par \par \par *** 11/10/2020 ***\par \par MAURO NUNEZ is here for hospital follow up and for transition of care for his epilepsy.\par he was recently admitted in the hospital and his hospital course is as follow:\par \par 70y R-handed M with h/o, HTN, HLD and L. 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 breakthrough\par seizure. Last seizure 2 years prior. Transferred from NSCU to EMU. Pt with\par great improvement.\par \par EEG Summary: 10/30/20\par Abnormal EEG in the awake, drowsy and asleep states.\par spikes, focal, left anterior temporal region\par continuous polymorphic delta, focal, left temporal region\par \par \par he had severe postictal psychosis and was extremely agitated for 2 days.\par He is stable now and he did not have any seizures, but he feels depressed.\par \par he had tried several AEDs in the past and developed SE or they were not covered by his insurance.\par \par he failed Keppra low dose, Depakote, Dilantin, zonisamide, Tegretol, Vimpat, Aptiom.\par \par his levels in the hospital were subtherapeutic likely due to drug-drug interaction\par \par \par \par \par

## 2021-03-16 PROCEDURE — 82330 ASSAY OF CALCIUM: CPT

## 2021-03-16 PROCEDURE — 84295 ASSAY OF SERUM SODIUM: CPT

## 2021-03-16 PROCEDURE — 97162 PT EVAL MOD COMPLEX 30 MIN: CPT

## 2021-03-16 PROCEDURE — 95715 VEEG EA 12-26HR INTMT MNTR: CPT

## 2021-03-16 PROCEDURE — 85018 HEMOGLOBIN: CPT

## 2021-03-16 PROCEDURE — 81001 URINALYSIS AUTO W/SCOPE: CPT

## 2021-03-16 PROCEDURE — 93005 ELECTROCARDIOGRAM TRACING: CPT

## 2021-03-16 PROCEDURE — 82435 ASSAY OF BLOOD CHLORIDE: CPT

## 2021-03-16 PROCEDURE — U0003: CPT

## 2021-03-16 PROCEDURE — 84132 ASSAY OF SERUM POTASSIUM: CPT

## 2021-03-16 PROCEDURE — 80048 BASIC METABOLIC PNL TOTAL CA: CPT

## 2021-03-16 PROCEDURE — 85027 COMPLETE CBC AUTOMATED: CPT

## 2021-03-16 PROCEDURE — 85730 THROMBOPLASTIN TIME PARTIAL: CPT

## 2021-03-16 PROCEDURE — U0005: CPT

## 2021-03-16 PROCEDURE — 85025 COMPLETE CBC W/AUTO DIFF WBC: CPT

## 2021-03-16 PROCEDURE — 71045 X-RAY EXAM CHEST 1 VIEW: CPT

## 2021-03-16 PROCEDURE — 96375 TX/PRO/DX INJ NEW DRUG ADDON: CPT

## 2021-03-16 PROCEDURE — 83605 ASSAY OF LACTIC ACID: CPT

## 2021-03-16 PROCEDURE — 84100 ASSAY OF PHOSPHORUS: CPT

## 2021-03-16 PROCEDURE — 93970 EXTREMITY STUDY: CPT

## 2021-03-16 PROCEDURE — 80164 ASSAY DIPROPYLACETIC ACD TOT: CPT

## 2021-03-16 PROCEDURE — 95714 VEEG EA 12-26 HR UNMNTR: CPT

## 2021-03-16 PROCEDURE — 85014 HEMATOCRIT: CPT

## 2021-03-16 PROCEDURE — 72170 X-RAY EXAM OF PELVIS: CPT

## 2021-03-16 PROCEDURE — 96374 THER/PROPH/DIAG INJ IV PUSH: CPT

## 2021-03-16 PROCEDURE — 80053 COMPREHEN METABOLIC PANEL: CPT

## 2021-03-16 PROCEDURE — 83735 ASSAY OF MAGNESIUM: CPT

## 2021-03-16 PROCEDURE — 94002 VENT MGMT INPAT INIT DAY: CPT

## 2021-03-16 PROCEDURE — 85610 PROTHROMBIN TIME: CPT

## 2021-03-16 PROCEDURE — 99285 EMERGENCY DEPT VISIT HI MDM: CPT

## 2021-03-16 PROCEDURE — 80177 DRUG SCRN QUAN LEVETIRACETAM: CPT

## 2021-03-16 PROCEDURE — 82962 GLUCOSE BLOOD TEST: CPT

## 2021-03-16 PROCEDURE — 31500 INSERT EMERGENCY AIRWAY: CPT

## 2021-03-16 PROCEDURE — 70450 CT HEAD/BRAIN W/O DYE: CPT

## 2021-03-16 PROCEDURE — 95700 EEG CONT REC W/VID EEG TECH: CPT

## 2021-03-16 PROCEDURE — 82803 BLOOD GASES ANY COMBINATION: CPT

## 2021-03-16 PROCEDURE — 82947 ASSAY GLUCOSE BLOOD QUANT: CPT

## 2021-03-16 PROCEDURE — 72125 CT NECK SPINE W/O DYE: CPT

## 2021-03-16 PROCEDURE — 80185 ASSAY OF PHENYTOIN TOTAL: CPT

## 2021-03-16 PROCEDURE — 82140 ASSAY OF AMMONIA: CPT

## 2021-03-16 PROCEDURE — 95711 VEEG 2-12 HR UNMONITORED: CPT

## 2021-03-16 PROCEDURE — 87086 URINE CULTURE/COLONY COUNT: CPT

## 2021-03-16 PROCEDURE — 84484 ASSAY OF TROPONIN QUANT: CPT

## 2021-03-17 ENCOUNTER — APPOINTMENT (OUTPATIENT)
Dept: INTERNAL MEDICINE | Facility: CLINIC | Age: 71
End: 2021-03-17
Payer: MEDICARE

## 2021-03-17 VITALS
DIASTOLIC BLOOD PRESSURE: 80 MMHG | BODY MASS INDEX: 28.36 KG/M2 | WEIGHT: 221 LBS | SYSTOLIC BLOOD PRESSURE: 130 MMHG | HEIGHT: 74 IN

## 2021-03-17 PROCEDURE — 99214 OFFICE O/P EST MOD 30 MIN: CPT

## 2021-03-17 NOTE — HISTORY OF PRESENT ILLNESS
[FreeTextEntry1] : epilepsy, HTN, anemia, elevated PSA, high glucose, and hypercholesterol\par  [de-identified] : No new complaints.\par

## 2021-03-17 NOTE — ASSESSMENT
[FreeTextEntry1] : Continue same blood pressure medication.  Follow blood pressure.\par Continue same cholesterol medication.  Follow lipid.\par follow CBC\par follow A1C\par elevated PSA-saw  recently\par epilepsy- Continue same medications.  per neuro\par

## 2021-04-30 ENCOUNTER — APPOINTMENT (OUTPATIENT)
Dept: NEUROLOGY | Facility: CLINIC | Age: 71
End: 2021-04-30
Payer: MEDICARE

## 2021-04-30 VITALS
BODY MASS INDEX: 29.26 KG/M2 | HEART RATE: 62 BPM | DIASTOLIC BLOOD PRESSURE: 74 MMHG | HEIGHT: 74 IN | WEIGHT: 228 LBS | SYSTOLIC BLOOD PRESSURE: 168 MMHG

## 2021-04-30 PROCEDURE — 99214 OFFICE O/P EST MOD 30 MIN: CPT

## 2021-04-30 RX ORDER — CENOBAMATE 200 MG/1
200 TABLET, FILM COATED ORAL DAILY
Qty: 30 | Refills: 5 | Status: DISCONTINUED | COMMUNITY
Start: 2021-02-02 | End: 2021-04-30

## 2021-05-02 NOTE — ASSESSMENT
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 continue keppra to 750 bid\par 2. Continue VPA ER  500-1000\par 3.OFF Dilantin 100\par 4. OFF XCOPRI\par 5. interested to start modified Atkins diet ( agree)\par 6. clonazepam ODT 0.25 mg prn auras ( nausea/abdominal rising sensation)\par \par

## 2021-05-02 NOTE — HISTORY OF PRESENT ILLNESS
[FreeTextEntry1] : *** 04/30/2021 *** \par \par MAURO NUNEZ is here for follow up. he did not have any seizures\par continue to be mildly depressed. will travel to [place] in the summer\par compliant with medication\par \par *** 03/12/2021 ***\par \par MAURO NUNEZ is here for follow up.\par \par vpa 500-1000\par keppra 750 bid\par \par He was recently admitted in the hospital and was intubated for a probable focal status epilepticus.\par \par He was tapering xcopri due to mood SE(?).\par Now, he is only on keppra and vpa\par \par *** 01/27/2021 *** \par \par MAURO NUNEZ is seen for follow up. continue to be depressed but no seizures. Tolerated well Xcopri\par \par *** 12/02/2020 *** \par \par MAURO NUNEZ is here for follow up.\par no seizures since dc\par continue to have depressed mood( medication SE?)\par \par Xcopri was approved but did not started as of yet due to high copay for increased dose ( at )\par \par \par *** 11/10/2020 ***\par \par MAURO NUNEZ is here for hospital follow up and for transition of care for his epilepsy.\par he was recently admitted in the hospital and his hospital course is as follow:\par \par 70y R-handed M with h/o, HTN, HLD and L. 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 breakthrough\par seizure. Last seizure 2 years prior. [facility] [facility] [place]. Pt with\par great improvement.\par \par EEG Summary: 10/30/20\par Abnormal EEG in the awake, drowsy and asleep states.\par spikes, focal, left anterior temporal region\par continuous polymorphic delta, focal, left temporal region\par \par \par he had severe postictal psychosis and was extremely agitated for 2 days.\par He is stable now and he did not have any seizures, but he feels depressed.\par \par he had tried several AEDs in the past and developed SE or they were not covered by his insurance.\par \par he failed Keppra low dose, Depakote, Dilantin, zonisamide, Tegretol, Vimpat, Aptiom.\par \par his levels in the hospital were subtherapeutic likely due to drug-drug interaction\par \par \par \par \par

## 2021-05-11 ENCOUNTER — TRANSCRIPTION ENCOUNTER (OUTPATIENT)
Age: 71
End: 2021-05-11

## 2021-05-12 ENCOUNTER — TRANSCRIPTION ENCOUNTER (OUTPATIENT)
Age: 71
End: 2021-05-12

## 2021-05-13 ENCOUNTER — TRANSCRIPTION ENCOUNTER (OUTPATIENT)
Age: 71
End: 2021-05-13

## 2021-05-13 RX ORDER — PHENYTOIN SODIUM 300 MG/1
300 CAPSULE, EXTENDED RELEASE ORAL DAILY
Qty: 30 | Refills: 5 | Status: DISCONTINUED | COMMUNITY
Start: 2020-07-28 | End: 2021-05-13

## 2021-05-13 RX ORDER — SIMVASTATIN 10 MG/1
10 TABLET, FILM COATED ORAL
Qty: 90 | Refills: 3 | Status: ACTIVE | COMMUNITY
Start: 2020-07-28 | End: 1900-01-01

## 2021-05-14 ENCOUNTER — TRANSCRIPTION ENCOUNTER (OUTPATIENT)
Age: 71
End: 2021-05-14

## 2021-05-18 ENCOUNTER — TRANSCRIPTION ENCOUNTER (OUTPATIENT)
Age: 71
End: 2021-05-18

## 2021-05-19 ENCOUNTER — TRANSCRIPTION ENCOUNTER (OUTPATIENT)
Age: 71
End: 2021-05-19

## 2021-06-03 ENCOUNTER — TRANSCRIPTION ENCOUNTER (OUTPATIENT)
Age: 71
End: 2021-06-03

## 2021-06-03 ENCOUNTER — NON-APPOINTMENT (OUTPATIENT)
Age: 71
End: 2021-06-03

## 2021-06-04 ENCOUNTER — TRANSCRIPTION ENCOUNTER (OUTPATIENT)
Age: 71
End: 2021-06-04

## 2021-07-02 ENCOUNTER — APPOINTMENT (OUTPATIENT)
Dept: NEUROLOGY | Facility: CLINIC | Age: 71
End: 2021-07-02
Payer: MEDICARE

## 2021-07-02 VITALS
WEIGHT: 210 LBS | HEIGHT: 74 IN | HEART RATE: 69 BPM | SYSTOLIC BLOOD PRESSURE: 185 MMHG | BODY MASS INDEX: 26.95 KG/M2 | DIASTOLIC BLOOD PRESSURE: 88 MMHG

## 2021-07-02 DIAGNOSIS — R53.83 OTHER FATIGUE: ICD-10-CM

## 2021-07-02 PROCEDURE — 99214 OFFICE O/P EST MOD 30 MIN: CPT

## 2021-07-05 PROBLEM — R53.83 FATIGUE: Status: ACTIVE | Noted: 2020-07-28

## 2021-07-05 NOTE — HISTORY OF PRESENT ILLNESS
[FreeTextEntry1] : *** 07/02/2021 ***\par \par MAURO NUNEZ is here for follow up. has back pain but no seizures.\par \par \par *** 04/30/2021 *** \par \par MAURO NUNEZ is here for follow up. he did not have any seizures\par continue to be mildly depressed. will travel to [place] in the summer\par compliant with medication\par \par *** 03/12/2021 ***\par \par MAURO NUNEZ is here for follow up.\par \par vpa 500-1000\par keppra 750 bid\par \par He was recently admitted in the hospital and was intubated for a probable focal status epilepticus.\par \par He was tapering xcopri due to mood SE(?).\par Now, he is only on keppra and vpa\par \par *** 01/27/2021 *** \par \par MAURO NUNEZ is seen for follow up. continue to be depressed but no seizures. Tolerated well Xcopri\par \par *** 12/02/2020 *** \par \par MAURO NUNEZ is here for follow up.\par no seizures since dc\par continue to have depressed mood( medication SE?)\par \par Xcopri was approved but did not started as of yet due to high copay for increased dose ( at )\par \par \par *** 11/10/2020 ***\par \par MAURO NUNEZ is here for hospital follow up and for transition of care for his epilepsy.\par he was recently admitted in the hospital and his hospital course is as follow:\par \par 70y R-handed M with h/o, HTN, HLD and L. 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 breakthrough\par seizure. Last seizure 2 years prior. [place] [facility] [place]. Pt with\par great improvement.\par \par EEG Summary: 10/30/20\par Abnormal EEG in the awake, drowsy and asleep states.\par spikes, focal, left anterior temporal region\par continuous polymorphic delta, focal, left temporal region\par \par \par he had severe postictal psychosis and was extremely agitated for 2 days.\par He is stable now and he did not have any seizures, but he feels depressed.\par \par he had tried several AEDs in the past and developed SE or they were not covered by his insurance.\par \par he failed Keppra low dose, Depakote, Dilantin, zonisamide, Tegretol, Vimpat, Aptiom.\par \par his levels in the hospital were subtherapeutic likely due to drug-drug interaction\par \par \par \par \par

## 2021-07-23 NOTE — ED PROVIDER NOTE - CROS ED [facility] ALL NEG
Pt is asking if it is still recommended she get abdominal US since she was diagnosed with H Pylori   negative...

## 2021-07-31 ENCOUNTER — TRANSCRIPTION ENCOUNTER (OUTPATIENT)
Age: 71
End: 2021-07-31

## 2021-08-05 ENCOUNTER — APPOINTMENT (OUTPATIENT)
Dept: INTERNAL MEDICINE | Facility: CLINIC | Age: 71
End: 2021-08-05
Payer: MEDICARE

## 2021-08-05 ENCOUNTER — LABORATORY RESULT (OUTPATIENT)
Age: 71
End: 2021-08-05

## 2021-08-05 PROCEDURE — 99212 OFFICE O/P EST SF 10 MIN: CPT

## 2021-08-05 PROCEDURE — 36415 COLL VENOUS BLD VENIPUNCTURE: CPT

## 2021-08-05 NOTE — PHYSICAL EXAM
[Well Nourished] : well nourished [No Joint Swelling] : no joint swelling [Alert and Oriented x3] : oriented to person, place, and time [Normal Mood] : the mood was normal [de-identified] : no tenderness of spine on palpation,using cane

## 2021-08-06 ENCOUNTER — NON-APPOINTMENT (OUTPATIENT)
Age: 71
End: 2021-08-06

## 2021-08-06 LAB
ALBUMIN SERPL ELPH-MCNC: 4.1 G/DL
ALP BLD-CCNC: 64 U/L
ALT SERPL-CCNC: 8 U/L
ANION GAP SERPL CALC-SCNC: 12 MMOL/L
AST SERPL-CCNC: 21 U/L
BASOPHILS # BLD AUTO: 0.05 K/UL
BASOPHILS NFR BLD AUTO: 0.6 %
BILIRUB SERPL-MCNC: 0.5 MG/DL
BUN SERPL-MCNC: 12 MG/DL
CALCIUM SERPL-MCNC: 9 MG/DL
CHLORIDE SERPL-SCNC: 99 MMOL/L
CHOLEST SERPL-MCNC: 169 MG/DL
CO2 SERPL-SCNC: 28 MMOL/L
CREAT SERPL-MCNC: 0.94 MG/DL
EOSINOPHIL # BLD AUTO: 0.02 K/UL
EOSINOPHIL NFR BLD AUTO: 0.2 %
ESTIMATED AVERAGE GLUCOSE: 114 MG/DL
GLUCOSE SERPL-MCNC: 100 MG/DL
HBA1C MFR BLD HPLC: 5.6 %
HCT VFR BLD CALC: 42.3 %
HDLC SERPL-MCNC: 50 MG/DL
HGB BLD-MCNC: 14.1 G/DL
IMM GRANULOCYTES NFR BLD AUTO: 1.4 %
LDLC SERPL CALC-MCNC: 104 MG/DL
LDLC SERPL DIRECT ASSAY-MCNC: 96 MG/DL
LYMPHOCYTES # BLD AUTO: 2.09 K/UL
LYMPHOCYTES NFR BLD AUTO: 25.7 %
MAN DIFF?: NORMAL
MCHC RBC-ENTMCNC: 30.8 PG
MCHC RBC-ENTMCNC: 33.3 GM/DL
MCV RBC AUTO: 92.4 FL
MONOCYTES # BLD AUTO: 0.79 K/UL
MONOCYTES NFR BLD AUTO: 9.7 %
NEUTROPHILS # BLD AUTO: 5.06 K/UL
NEUTROPHILS NFR BLD AUTO: 62.4 %
NONHDLC SERPL-MCNC: 120 MG/DL
PLATELET # BLD AUTO: 186 K/UL
POTASSIUM SERPL-SCNC: 3.8 MMOL/L
PROT SERPL-MCNC: 6.8 G/DL
PSA SERPL-MCNC: 3.87 NG/ML
RBC # BLD: 4.58 M/UL
RBC # FLD: 14.1 %
SODIUM SERPL-SCNC: 139 MMOL/L
T3 SERPL-MCNC: 94 NG/DL
T3RU NFR SERPL: 1 TBI
T4 FREE SERPL-MCNC: 1 NG/DL
TRIGL SERPL-MCNC: 77 MG/DL
TSH SERPL-ACNC: 0.87 UIU/ML
WBC # FLD AUTO: 8.12 K/UL

## 2021-08-10 ENCOUNTER — APPOINTMENT (OUTPATIENT)
Dept: INTERNAL MEDICINE | Facility: CLINIC | Age: 71
End: 2021-08-10
Payer: MEDICARE

## 2021-08-10 VITALS
DIASTOLIC BLOOD PRESSURE: 80 MMHG | BODY MASS INDEX: 32.08 KG/M2 | OXYGEN SATURATION: 97 % | WEIGHT: 250 LBS | HEIGHT: 74 IN | SYSTOLIC BLOOD PRESSURE: 140 MMHG | HEART RATE: 63 BPM | TEMPERATURE: 97.9 F

## 2021-08-10 PROCEDURE — 90732 PPSV23 VACC 2 YRS+ SUBQ/IM: CPT

## 2021-08-10 PROCEDURE — G0009: CPT

## 2021-08-10 PROCEDURE — 99214 OFFICE O/P EST MOD 30 MIN: CPT | Mod: 25

## 2021-08-10 PROCEDURE — 93000 ELECTROCARDIOGRAM COMPLETE: CPT

## 2021-08-10 NOTE — HISTORY OF PRESENT ILLNESS
[FreeTextEntry1] : HTN, high glucose, elevated PSA, CVA, sz disorder, and hypercholesterol\par  [de-identified] : fatigue\par

## 2021-08-10 NOTE — PHYSICAL EXAM
[No Acute Distress] : no acute distress [No Respiratory Distress] : no respiratory distress  [No Accessory Muscle Use] : no accessory muscle use [Clear to Auscultation] : lungs were clear to auscultation bilaterally [Normal Rate] : normal rate  [Regular Rhythm] : with a regular rhythm [Soft] : abdomen soft [Non Tender] : non-tender [Testes Mass (___cm)] : there were no testicular masses [Normal Posterior Cervical Nodes] : no posterior cervical lymphadenopathy [Normal Anterior Cervical Nodes] : no anterior cervical lymphadenopathy [de-identified] : +1 seb. LE edema

## 2021-08-10 NOTE — ASSESSMENT
[FreeTextEntry1] : pt gained 30 lbs in 4 mo.\par pt walking with cane\par Continue same blood pressure medication.  Follow blood pressure.\par Continue same cholesterol medication.  Follow lipid.\par follow A1C\par follow PSA.  sees MARI (Michael)\par sz disorder-cont same meds.  per neuro\par pneumovax\par edema-follow.  consider echo and CXR

## 2021-08-11 ENCOUNTER — MED ADMIN CHARGE (OUTPATIENT)
Age: 71
End: 2021-08-11

## 2021-09-03 ENCOUNTER — APPOINTMENT (OUTPATIENT)
Dept: NEUROLOGY | Facility: CLINIC | Age: 71
End: 2021-09-03
Payer: MEDICARE

## 2021-09-03 VITALS
SYSTOLIC BLOOD PRESSURE: 180 MMHG | HEART RATE: 63 BPM | BODY MASS INDEX: 32.08 KG/M2 | HEIGHT: 74 IN | DIASTOLIC BLOOD PRESSURE: 86 MMHG | WEIGHT: 250 LBS

## 2021-09-03 PROCEDURE — 99214 OFFICE O/P EST MOD 30 MIN: CPT

## 2021-09-03 RX ORDER — DONEPEZIL HYDROCHLORIDE 10 MG/1
10 TABLET ORAL DAILY
Qty: 90 | Refills: 3 | Status: DISCONTINUED | COMMUNITY
Start: 2020-07-28 | End: 2021-09-03

## 2021-09-03 RX ORDER — ESCITALOPRAM OXALATE 10 MG/1
10 TABLET ORAL DAILY
Qty: 90 | Refills: 3 | Status: DISCONTINUED | COMMUNITY
Start: 2020-07-28 | End: 2021-09-03

## 2021-09-07 NOTE — ASSESSMENT
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 continue keppra to 750 bid\par 2. Continue VPA ER  500-1000\par 3.OFF Dilantin 100\par 4. OFF XCOPRI\par 5. interested to start modified Atkins diet ( agree)\par 6. clonazepam ODT 0.25 mg prn auras ( nausea/abdominal rising sensation)\par 7 was on lexapro 10 mg from outside neurologist. will dc for now but he may need it in the future if continue to be depressed.\par \par

## 2021-09-21 ENCOUNTER — TRANSCRIPTION ENCOUNTER (OUTPATIENT)
Age: 71
End: 2021-09-21

## 2021-09-22 ENCOUNTER — RX RENEWAL (OUTPATIENT)
Age: 71
End: 2021-09-22

## 2021-09-25 ENCOUNTER — INPATIENT (INPATIENT)
Facility: HOSPITAL | Age: 71
LOS: 2 days | Discharge: HOME | DRG: 101 | End: 2021-09-28
Attending: PSYCHIATRY & NEUROLOGY | Admitting: PSYCHIATRY & NEUROLOGY
Payer: MEDICARE

## 2021-09-25 VITALS
SYSTOLIC BLOOD PRESSURE: 213 MMHG | HEIGHT: 74 IN | TEMPERATURE: 98 F | OXYGEN SATURATION: 96 % | DIASTOLIC BLOOD PRESSURE: 106 MMHG | HEART RATE: 101 BPM | RESPIRATION RATE: 18 BRPM

## 2021-09-25 DIAGNOSIS — Z98.890 OTHER SPECIFIED POSTPROCEDURAL STATES: Chronic | ICD-10-CM

## 2021-09-25 DIAGNOSIS — R56.9 UNSPECIFIED CONVULSIONS: ICD-10-CM

## 2021-09-25 LAB
ANION GAP SERPL CALC-SCNC: 13 MMOL/L — SIGNIFICANT CHANGE UP (ref 5–17)
APPEARANCE UR: CLEAR — SIGNIFICANT CHANGE UP
BACTERIA # UR AUTO: NEGATIVE — SIGNIFICANT CHANGE UP
BASE EXCESS BLDV CALC-SCNC: 3.1 MMOL/L — HIGH (ref -2–2)
BASE EXCESS BLDV CALC-SCNC: 5.2 MMOL/L — HIGH (ref -2–2)
BASOPHILS # BLD AUTO: 0.06 K/UL — SIGNIFICANT CHANGE UP (ref 0–0.2)
BASOPHILS NFR BLD AUTO: 0.5 % — SIGNIFICANT CHANGE UP (ref 0–2)
BILIRUB UR-MCNC: NEGATIVE — SIGNIFICANT CHANGE UP
BUN SERPL-MCNC: 12 MG/DL — SIGNIFICANT CHANGE UP (ref 7–23)
CA-I SERPL-SCNC: 1.11 MMOL/L — LOW (ref 1.15–1.33)
CA-I SERPL-SCNC: 1.12 MMOL/L — LOW (ref 1.15–1.33)
CALCIUM SERPL-MCNC: 8.6 MG/DL — SIGNIFICANT CHANGE UP (ref 8.4–10.5)
CHLORIDE BLDV-SCNC: 100 MMOL/L — SIGNIFICANT CHANGE UP (ref 96–108)
CHLORIDE BLDV-SCNC: 102 MMOL/L — SIGNIFICANT CHANGE UP (ref 96–108)
CHLORIDE SERPL-SCNC: 103 MMOL/L — SIGNIFICANT CHANGE UP (ref 96–108)
CK SERPL-CCNC: 282 U/L — HIGH (ref 30–200)
CO2 BLDV-SCNC: 32 MMOL/L — HIGH (ref 22–26)
CO2 BLDV-SCNC: 34 MMOL/L — HIGH (ref 22–26)
CO2 SERPL-SCNC: 27 MMOL/L — SIGNIFICANT CHANGE UP (ref 22–31)
COLOR SPEC: SIGNIFICANT CHANGE UP
CREAT SERPL-MCNC: 0.89 MG/DL — SIGNIFICANT CHANGE UP (ref 0.5–1.3)
DIFF PNL FLD: ABNORMAL
EOSINOPHIL # BLD AUTO: 0.01 K/UL — SIGNIFICANT CHANGE UP (ref 0–0.5)
EOSINOPHIL NFR BLD AUTO: 0.1 % — SIGNIFICANT CHANGE UP (ref 0–6)
EPI CELLS # UR: 1 /HPF — SIGNIFICANT CHANGE UP
GAS PNL BLDV: 137 MMOL/L — SIGNIFICANT CHANGE UP (ref 136–145)
GAS PNL BLDV: 138 MMOL/L — SIGNIFICANT CHANGE UP (ref 136–145)
GAS PNL BLDV: SIGNIFICANT CHANGE UP
GLUCOSE BLDC GLUCOMTR-MCNC: 121 MG/DL — HIGH (ref 70–99)
GLUCOSE BLDV-MCNC: 101 MG/DL — HIGH (ref 70–99)
GLUCOSE BLDV-MCNC: 136 MG/DL — HIGH (ref 70–99)
GLUCOSE SERPL-MCNC: 102 MG/DL — HIGH (ref 70–99)
GLUCOSE UR QL: NEGATIVE — SIGNIFICANT CHANGE UP
HCO3 BLDV-SCNC: 30 MMOL/L — HIGH (ref 22–29)
HCO3 BLDV-SCNC: 32 MMOL/L — HIGH (ref 22–29)
HCT VFR BLD CALC: 40.5 % — SIGNIFICANT CHANGE UP (ref 39–50)
HCT VFR BLD CALC: 42.6 % — SIGNIFICANT CHANGE UP (ref 39–50)
HCT VFR BLDA CALC: 44 % — SIGNIFICANT CHANGE UP (ref 39–51)
HCT VFR BLDA CALC: 44 % — SIGNIFICANT CHANGE UP (ref 39–51)
HGB BLD CALC-MCNC: 14.6 G/DL — SIGNIFICANT CHANGE UP (ref 12.6–17.4)
HGB BLD CALC-MCNC: 14.7 G/DL — SIGNIFICANT CHANGE UP (ref 12.6–17.4)
HGB BLD-MCNC: 13.7 G/DL — SIGNIFICANT CHANGE UP (ref 13–17)
HGB BLD-MCNC: 14.1 G/DL — SIGNIFICANT CHANGE UP (ref 13–17)
HYALINE CASTS # UR AUTO: 0 /LPF — SIGNIFICANT CHANGE UP (ref 0–2)
IMM GRANULOCYTES NFR BLD AUTO: 1.6 % — HIGH (ref 0–1.5)
KETONES UR-MCNC: NEGATIVE — SIGNIFICANT CHANGE UP
LACTATE BLDV-MCNC: 2.6 MMOL/L — HIGH (ref 0.7–2)
LACTATE BLDV-MCNC: 5.1 MMOL/L — CRITICAL HIGH (ref 0.7–2)
LEUKOCYTE ESTERASE UR-ACNC: NEGATIVE — SIGNIFICANT CHANGE UP
LYMPHOCYTES # BLD AUTO: 2.63 K/UL — SIGNIFICANT CHANGE UP (ref 1–3.3)
LYMPHOCYTES # BLD AUTO: 22.6 % — SIGNIFICANT CHANGE UP (ref 13–44)
MAGNESIUM SERPL-MCNC: 1.8 MG/DL — SIGNIFICANT CHANGE UP (ref 1.6–2.6)
MCHC RBC-ENTMCNC: 30.4 PG — SIGNIFICANT CHANGE UP (ref 27–34)
MCHC RBC-ENTMCNC: 30.5 PG — SIGNIFICANT CHANGE UP (ref 27–34)
MCHC RBC-ENTMCNC: 33.1 GM/DL — SIGNIFICANT CHANGE UP (ref 32–36)
MCHC RBC-ENTMCNC: 33.8 GM/DL — SIGNIFICANT CHANGE UP (ref 32–36)
MCV RBC AUTO: 89.8 FL — SIGNIFICANT CHANGE UP (ref 80–100)
MCV RBC AUTO: 92.2 FL — SIGNIFICANT CHANGE UP (ref 80–100)
MONOCYTES # BLD AUTO: 0.77 K/UL — SIGNIFICANT CHANGE UP (ref 0–0.9)
MONOCYTES NFR BLD AUTO: 6.6 % — SIGNIFICANT CHANGE UP (ref 2–14)
NEUTROPHILS # BLD AUTO: 7.99 K/UL — HIGH (ref 1.8–7.4)
NEUTROPHILS NFR BLD AUTO: 68.6 % — SIGNIFICANT CHANGE UP (ref 43–77)
NITRITE UR-MCNC: NEGATIVE — SIGNIFICANT CHANGE UP
NRBC # BLD: 0 /100 WBCS — SIGNIFICANT CHANGE UP (ref 0–0)
NRBC # BLD: 0 /100 WBCS — SIGNIFICANT CHANGE UP (ref 0–0)
PCO2 BLDV: 53 MMHG — SIGNIFICANT CHANGE UP (ref 42–55)
PCO2 BLDV: 54 MMHG — SIGNIFICANT CHANGE UP (ref 42–55)
PH BLDV: 7.36 — SIGNIFICANT CHANGE UP (ref 7.32–7.43)
PH BLDV: 7.38 — SIGNIFICANT CHANGE UP (ref 7.32–7.43)
PH UR: 8 — SIGNIFICANT CHANGE UP (ref 5–8)
PHOSPHATE SERPL-MCNC: 3 MG/DL — SIGNIFICANT CHANGE UP (ref 2.5–4.5)
PLATELET # BLD AUTO: 180 K/UL — SIGNIFICANT CHANGE UP (ref 150–400)
PLATELET # BLD AUTO: 191 K/UL — SIGNIFICANT CHANGE UP (ref 150–400)
PO2 BLDV: 45 MMHG — SIGNIFICANT CHANGE UP (ref 25–45)
PO2 BLDV: 49 MMHG — HIGH (ref 25–45)
POTASSIUM BLDV-SCNC: 3.9 MMOL/L — SIGNIFICANT CHANGE UP (ref 3.5–5.1)
POTASSIUM BLDV-SCNC: 4.8 MMOL/L — SIGNIFICANT CHANGE UP (ref 3.5–5.1)
POTASSIUM SERPL-MCNC: 4.3 MMOL/L — SIGNIFICANT CHANGE UP (ref 3.5–5.3)
POTASSIUM SERPL-SCNC: 4.3 MMOL/L — SIGNIFICANT CHANGE UP (ref 3.5–5.3)
PROT UR-MCNC: SIGNIFICANT CHANGE UP
RAPID RVP RESULT: SIGNIFICANT CHANGE UP
RBC # BLD: 4.51 M/UL — SIGNIFICANT CHANGE UP (ref 4.2–5.8)
RBC # BLD: 4.62 M/UL — SIGNIFICANT CHANGE UP (ref 4.2–5.8)
RBC # FLD: 13.7 % — SIGNIFICANT CHANGE UP (ref 10.3–14.5)
RBC # FLD: 13.9 % — SIGNIFICANT CHANGE UP (ref 10.3–14.5)
RBC CASTS # UR COMP ASSIST: 19 /HPF — HIGH (ref 0–4)
SAO2 % BLDV: 71.7 % — SIGNIFICANT CHANGE UP (ref 67–88)
SAO2 % BLDV: 80.1 % — SIGNIFICANT CHANGE UP (ref 67–88)
SARS-COV-2 RNA SPEC QL NAA+PROBE: SIGNIFICANT CHANGE UP
SODIUM SERPL-SCNC: 143 MMOL/L — SIGNIFICANT CHANGE UP (ref 135–145)
SP GR SPEC: 1.01 — SIGNIFICANT CHANGE UP (ref 1.01–1.02)
UROBILINOGEN FLD QL: NEGATIVE — SIGNIFICANT CHANGE UP
VALPROATE SERPL-MCNC: 110 UG/ML — HIGH (ref 50–100)
WBC # BLD: 11.65 K/UL — HIGH (ref 3.8–10.5)
WBC # BLD: 11.66 K/UL — HIGH (ref 3.8–10.5)
WBC # FLD AUTO: 11.65 K/UL — HIGH (ref 3.8–10.5)
WBC # FLD AUTO: 11.66 K/UL — HIGH (ref 3.8–10.5)
WBC UR QL: 0 /HPF — SIGNIFICANT CHANGE UP (ref 0–5)

## 2021-09-25 PROCEDURE — 93010 ELECTROCARDIOGRAM REPORT: CPT

## 2021-09-25 PROCEDURE — 70450 CT HEAD/BRAIN W/O DYE: CPT | Mod: 26

## 2021-09-25 PROCEDURE — 99223 1ST HOSP IP/OBS HIGH 75: CPT

## 2021-09-25 PROCEDURE — 71045 X-RAY EXAM CHEST 1 VIEW: CPT | Mod: 26

## 2021-09-25 PROCEDURE — 95720 EEG PHY/QHP EA INCR W/VEEG: CPT

## 2021-09-25 PROCEDURE — 99285 EMERGENCY DEPT VISIT HI MDM: CPT | Mod: GC

## 2021-09-25 RX ORDER — LEVETIRACETAM 250 MG/1
1500 TABLET, FILM COATED ORAL EVERY 12 HOURS
Refills: 0 | Status: DISCONTINUED | OUTPATIENT
Start: 2021-09-25 | End: 2021-09-26

## 2021-09-25 RX ORDER — NEBIVOLOL HYDROCHLORIDE 5 MG/1
10 TABLET ORAL DAILY
Refills: 0 | Status: DISCONTINUED | OUTPATIENT
Start: 2021-09-25 | End: 2021-09-28

## 2021-09-25 RX ORDER — SIMVASTATIN 20 MG/1
10 TABLET, FILM COATED ORAL AT BEDTIME
Refills: 0 | Status: DISCONTINUED | OUTPATIENT
Start: 2021-09-25 | End: 2021-09-28

## 2021-09-25 RX ORDER — VALPROIC ACID (AS SODIUM SALT) 250 MG/5ML
500 SOLUTION, ORAL ORAL EVERY 8 HOURS
Refills: 0 | Status: DISCONTINUED | OUTPATIENT
Start: 2021-09-25 | End: 2021-09-27

## 2021-09-25 RX ORDER — INFLUENZA VIRUS VACCINE 15; 15; 15; 15 UG/.5ML; UG/.5ML; UG/.5ML; UG/.5ML
0.5 SUSPENSION INTRAMUSCULAR ONCE
Refills: 0 | Status: DISCONTINUED | OUTPATIENT
Start: 2021-09-25 | End: 2021-09-28

## 2021-09-25 RX ORDER — ENOXAPARIN SODIUM 100 MG/ML
40 INJECTION SUBCUTANEOUS DAILY
Refills: 0 | Status: DISCONTINUED | OUTPATIENT
Start: 2021-09-25 | End: 2021-09-28

## 2021-09-25 RX ORDER — VALPROIC ACID (AS SODIUM SALT) 250 MG/5ML
750 SOLUTION, ORAL ORAL
Refills: 0 | Status: DISCONTINUED | OUTPATIENT
Start: 2021-09-25 | End: 2021-09-25

## 2021-09-25 RX ORDER — FINASTERIDE 5 MG/1
5 TABLET, FILM COATED ORAL DAILY
Refills: 0 | Status: DISCONTINUED | OUTPATIENT
Start: 2021-09-25 | End: 2021-09-28

## 2021-09-25 RX ORDER — VALPROIC ACID (AS SODIUM SALT) 250 MG/5ML
1000 SOLUTION, ORAL ORAL ONCE
Refills: 0 | Status: COMPLETED | OUTPATIENT
Start: 2021-09-25 | End: 2021-09-25

## 2021-09-25 RX ORDER — LACOSAMIDE 50 MG/1
200 TABLET ORAL ONCE
Refills: 0 | Status: DISCONTINUED | OUTPATIENT
Start: 2021-09-25 | End: 2021-09-28

## 2021-09-25 RX ORDER — SODIUM CHLORIDE 9 MG/ML
1000 INJECTION INTRAMUSCULAR; INTRAVENOUS; SUBCUTANEOUS
Refills: 0 | Status: DISCONTINUED | OUTPATIENT
Start: 2021-09-25 | End: 2021-09-26

## 2021-09-25 RX ORDER — ASPIRIN/CALCIUM CARB/MAGNESIUM 324 MG
81 TABLET ORAL DAILY
Refills: 0 | Status: DISCONTINUED | OUTPATIENT
Start: 2021-09-25 | End: 2021-09-28

## 2021-09-25 RX ORDER — SODIUM CHLORIDE 9 MG/ML
500 INJECTION INTRAMUSCULAR; INTRAVENOUS; SUBCUTANEOUS ONCE
Refills: 0 | Status: COMPLETED | OUTPATIENT
Start: 2021-09-25 | End: 2021-09-25

## 2021-09-25 RX ADMIN — SIMVASTATIN 10 MILLIGRAM(S): 20 TABLET, FILM COATED ORAL at 22:25

## 2021-09-25 RX ADMIN — SODIUM CHLORIDE 500 MILLILITER(S): 9 INJECTION INTRAMUSCULAR; INTRAVENOUS; SUBCUTANEOUS at 12:58

## 2021-09-25 RX ADMIN — LEVETIRACETAM 400 MILLIGRAM(S): 250 TABLET, FILM COATED ORAL at 22:24

## 2021-09-25 RX ADMIN — SODIUM CHLORIDE 75 MILLILITER(S): 9 INJECTION INTRAMUSCULAR; INTRAVENOUS; SUBCUTANEOUS at 17:03

## 2021-09-25 RX ADMIN — Medication 27.5 MILLIGRAM(S): at 22:24

## 2021-09-25 RX ADMIN — Medication 2 MILLIGRAM(S): at 11:41

## 2021-09-25 RX ADMIN — Medication 25 MILLIGRAM(S): at 16:14

## 2021-09-25 RX ADMIN — Medication 1 MILLIGRAM(S): at 12:00

## 2021-09-25 NOTE — ED PROVIDER NOTE - CLINICAL SUMMARY MEDICAL DECISION MAKING FREE TEXT BOX
71M p/w seizures.  RUE and RLE twitching.  5mg IM versed given by EMS, status post 2mg and 1mg IV ativan in the ED w/ improvement on sxs.  Twitching present, however, unsure if this is post-ictal as pt. obeys commands.  Will obtain labs, CTh, consult neuro, reassess, and dispo pending results.

## 2021-09-25 NOTE — ED ADULT NURSE NOTE - INTERVENTIONS DEFINITIONS
[place] to call system/Call bell, personal items and telephone within reach/Instruct patient to call for assistance/Physically safe environment: no spills, clutter or unnecessary equipment/Monitor gait and stability/Monitor for mental status changes and reorient to person, place, and time/Reinforce activity limits and safety measures with patient and family

## 2021-09-25 NOTE — H&P ADULT - NSHPPHYSICALEXAM_GEN_ALL_CORE
Neurological Exam:  Mental Status: Eyes closed, opens them to verbal stimuli. Stares to left. lethargic.  Mostly mute-1 word at times.  Doesn't follow most commands.      Cranial Nerves: PERRL, EOMI, VFF, no nystagmus.  No facial asymmetry. No tongue bite noted.     Motor:   Tone: normal            Strength:     Moves extremities spontaneously at times.     Pronator drift: could not test            Dysmetria: unable to test    Tremor: No resting, postural or action tremor.  No myoclonus.    Sensation: unable to test    Deep Tendon Reflexes: 2+ bilateral biceps, triceps, brachioradialis, +1 knee and ankle bilaterally  Babinski down -right, left mute    Gait: deferred

## 2021-09-25 NOTE — ED PROVIDER NOTE - ATTENDING CONTRIBUTION TO CARE
Private Physician Jacinda Hoover Neuro/Epiplesy  71 male pmh Epiplesy, w complex partial seizure, Perviosly intubated. HTN,HLD, sp crainotomy for temproal cavernoma. Pt comes to ed c/o Pt was at home last know well at 10am. Found in bed w ams by family at 1030 w incontinence. Subsequently had another seizure and found w tonic cloinic seizure tx Versed 5 im enroute without effect. Hx unable initally. PE Gen adult male active seizure. Heent normocephalic atraumatic neck supple chest clear anterior & posterior cv no rubs, gallops or murmurs abd soft +bs no mass guarding Neuro gen tonic clonic, movements.   Manuel Mcintyre MD, Facep    1154 am spouse arrived. Pt has previously been intubated for status, Today not as bad as previously seizure.

## 2021-09-25 NOTE — H&P ADULT - HISTORY OF PRESENT ILLNESS
A  71M w/ PMHx of meningioma resection in 2003 and seizure disorder on keppra and depakote BIBEMS and wife for weakness (could not take off his shirt) and confusion (did not know name or birthday) around 10:30 AM today. 5mg IM versed given by EMS en route. Upon ED arrival to ED at 12 PM, pt was shaking his right leg for 10 minutes but there no reported urinary incontinence or tongue biting and pt was noted to be able to move left hand. Pt was looking to the left more than his right and with eyes closed, lethargic, not verbally responsive initially. Later in the day he seems a bit more alert but still lethargic and occasional 1 word replies. Denies any f/c, sick contacts. Last 2 seizures were in March 2021 and October 2020. Pt typically gets weakness and confused after his seizures. He is on 1500 mg depakote (500 mg at 10 AM and 1000 mg at 10 PM)  and 1500 mg keppra BID at home. Per wife at bedside, pt. is compliant w/ his meds, no known sick contacts, vaccinated for covid.  No recent falls or head trauma. PT is nauseous but no reported fever, chills, headaches per wife. No hx of CVA and not on blood thinners.     CT brain was done to rule out CVA in the ED.

## 2021-09-25 NOTE — ED PROVIDER NOTE - PHYSICAL EXAMINATION
GENERAL: Patient awake alert NAD.  HEENT: NC/AT, Moist mucous membranes, no tongue laceration noted.  LUNGS: CTAB, no wheezes or crackles, SpO2 100%.  CARDIAC: RRR, no m/r/g.  ABDOMEN: Soft, NT, ND, No rebound, guarding.  EXT: No edema. No calf tenderness.  MSK: No pain with movement, no deformities.  NEURO: Twitching of RLE and RUE, although localizes to pain using RUE.  SKIN: Warm and dry. No rash.  PSYCH: Normal affect.

## 2021-09-25 NOTE — H&P ADULT - ASSESSMENT
A  71M w/ PMHx of meningioma resection in 2003 and seizure disorder on keppra and depakote BIBEMS and wife for weakness (could not take off his shirt) and confusion (did not know name or birthday) around 10:30 AM today. 5mg IM versed given by EMS en route. Upon ED arrival to ED at 12 PM, pt was shaking his right leg for 10 minutes but there no reported urinary incontinence or tongue biting and pt was noted to be able to move left hand. Pt was looking to the left more than his right and with eyes closed, lethargic, not verbally responsive initially. Later in the day he seems a bit more alert but still lethargic and occasional 1 word replies. Denies any f/c, sick contacts. Last 2 seizures were in March 2021 and October 2020. Pt typically gets weakness and confused after his seizures. He is on 1500 mg depakote (500 mg at 10 AM and 1000 mg at 10 PM)  and 1500 mg keppra BID at home. Per wife at bedside, pt. is compliant w/ his meds, no known sick contacts, vaccinated for covid.  No recent falls or head trauma. PT is nauseous but no reported fever, chills, headaches per wife. No hx of CVA and not on blood thinners.     Loaded with 1000 mg VPA in ED.    CT brain was done to rule out CVA in the ED and it was negative. Neuro exam revealed lethargic states with mimimal speech and command following.     Impression: weakness, confusion, right leg shaking may be possibly 2/2 postictal state vs. seizure activity 2/2 meningioma resection vs. unknown etiology.    Plan:   Valproic acid 750 mg BID  keppra 1500 mg BID     A  71M w/ PMHx of meningioma resection in 2003 and seizure disorder on keppra and depakote BIBEMS and wife for weakness (could not take off his shirt) and confusion (did not know name or birthday) around 10:30 AM today. 5mg IM versed given by EMS en route. Upon ED arrival to ED at 12 PM, pt was shaking his right leg for 10 minutes but there no reported urinary incontinence or tongue biting and pt was noted to be able to move left hand. Pt was looking to the left more than his right and with eyes closed, lethargic, not verbally responsive initially. Later in the day he seems a bit more alert but still lethargic and occasional 1 word replies. Denies any f/c, sick contacts. Last 2 seizures were in March 2021 and October 2020. Pt typically gets weakness and confused after his seizures. He is on 1500 mg depakote (500 mg at 10 AM and 1000 mg at 10 PM)  and 1500 mg keppra BID at home. Per wife at bedside, pt. is compliant w/ his meds, no known sick contacts, vaccinated for covid.  No recent falls or head trauma. PT is nauseous but no reported fever, chills, headaches per wife. No hx of CVA and not on blood thinners.     Loaded with 1000 mg VPA in ED.    CT brain was done to rule out CVA in the ED and it was negative. Neuro exam revealed lethargic states with mimimal speech and command following.     Impression: weakness, confusion, right leg shaking may be possibly 2/2 postictal state vs. seizure activity 2/2 meningioma resection vs. unknown etiology.    Plan:   Video EEG  Valproic acid 750 mg BID  keppra 1500 mg BID  Administer  1 mg IV Ativan PRN STAT for seizure activity or GTC > 3 minutes or significant derangement of vital signs.  -Administer 200 mg IV vimpatover 15 minutes for seizures refractory to ativan.  -Toxic/metabolic/infectious work up per primary team- CBC, CMP, urine toxicology, urine cx, blood cx, alcohol level,   -If pt has a convulsion, please document accurately the lenght of episode and specifically what the pt was doing paying attention to eye blinking vs. closure, gaze deviation, shaking of extremities, tongue biting, urinary incontinence, any derangements of vital signs  -Advise pt not to drive, operate heavy machinery, avoid heights, pools, bathtubs, locked doors.     Case to be discussed with epileptologist Dr. Hoover.

## 2021-09-25 NOTE — ED ADULT NURSE REASSESSMENT NOTE - NS ED NURSE REASSESS COMMENT FT1
Patient found to be alert and responsive to verbal stimuli. Patient noted to have episode of right side weakness. MD Jensen made aware and at bedside to evaluate patient. During MD Jensen assessment, patient noted to have resolving right side weakness and continuing to become more alert and responsive and is able to answer questions with 1-2 word responses. Per wife at bedside, patient "gets weakness for hours after he has seizures." Discussed plan with MD Jensen and per MD code stroke not to be called at this time. Patient taken for CT scan.

## 2021-09-25 NOTE — H&P ADULT - NSHPLABSRESULTS_GEN_ALL_CORE
CT brain: Mild periventricular white matter ischemia. Focal encephalomalacia and gliosis in the LEFT temporal lobe with overlying craniectomy and craniotomy. Moderate atrophy.

## 2021-09-25 NOTE — ED ADULT NURSE NOTE - OBJECTIVE STATEMENT
70 yo M presents to ED via EMS from home c/o seizures. As per EMS, patient was found by wife to be incontinent and "not answering and not acting like himself" around 10am this morning. Wife states "I asked him his name and he didn't answer me." As per wife at bedside, patient has history of seizures and states patient is normally compliant with his seizure medications but is unsure if he took his medication this morning. Patient given 5mg IM Versed by EMS prior to arrival. Patient arrives to ED with involuntary shaking to his right arm and leg and responsive to painful stimuli. Breathing spontaneous and unlabored, placed on 6L NC. Patient placed on cardiac monitor and continuous pulse ox. As per wife, patients last seizure was in March 2021. Bed in lowest position, side rails up. Wife updated on plan of care.

## 2021-09-25 NOTE — ED PROVIDER NOTE - OBJECTIVE STATEMENT
71M w/ PMHx of seizure disorder on keppra, depakote BIBEMS for seizure.  Last known normal ~10am.  Pt. was found in bed, incontinent, by family member.  Denies any f/c, sick contacts.  5mg IM versed given by EMS en route.  Was admitted in March for seizure, neurologist Kiko.  Per wife at bedside, pt. is compliant w/ his meds, no known sick contacts, vaccinated for covid.  Pt. has a PSHx of meningioma resection in 2003.  Per EMS, pt. was able to tell them his name but continues to have RLE twitching.  No falls or head trauma.

## 2021-09-26 LAB
ANION GAP SERPL CALC-SCNC: 14 MMOL/L — SIGNIFICANT CHANGE UP (ref 5–17)
BUN SERPL-MCNC: 13 MG/DL — SIGNIFICANT CHANGE UP (ref 7–23)
CALCIUM SERPL-MCNC: 8.4 MG/DL — SIGNIFICANT CHANGE UP (ref 8.4–10.5)
CHLORIDE SERPL-SCNC: 104 MMOL/L — SIGNIFICANT CHANGE UP (ref 96–108)
CO2 SERPL-SCNC: 24 MMOL/L — SIGNIFICANT CHANGE UP (ref 22–31)
COVID-19 SPIKE DOMAIN AB INTERP: POSITIVE
COVID-19 SPIKE DOMAIN ANTIBODY RESULT: 55 U/ML — HIGH
CREAT SERPL-MCNC: 0.88 MG/DL — SIGNIFICANT CHANGE UP (ref 0.5–1.3)
GLUCOSE SERPL-MCNC: 81 MG/DL — SIGNIFICANT CHANGE UP (ref 70–99)
HCT VFR BLD CALC: 36.2 % — LOW (ref 39–50)
HCV AB S/CO SERPL IA: 0.1 S/CO — SIGNIFICANT CHANGE UP (ref 0–0.99)
HCV AB SERPL-IMP: SIGNIFICANT CHANGE UP
HGB BLD-MCNC: 12.6 G/DL — LOW (ref 13–17)
MCHC RBC-ENTMCNC: 31 PG — SIGNIFICANT CHANGE UP (ref 27–34)
MCHC RBC-ENTMCNC: 34.8 GM/DL — SIGNIFICANT CHANGE UP (ref 32–36)
MCV RBC AUTO: 89.2 FL — SIGNIFICANT CHANGE UP (ref 80–100)
NRBC # BLD: 0 /100 WBCS — SIGNIFICANT CHANGE UP (ref 0–0)
PLATELET # BLD AUTO: 182 K/UL — SIGNIFICANT CHANGE UP (ref 150–400)
POTASSIUM SERPL-MCNC: 4.4 MMOL/L — SIGNIFICANT CHANGE UP (ref 3.5–5.3)
POTASSIUM SERPL-SCNC: 4.4 MMOL/L — SIGNIFICANT CHANGE UP (ref 3.5–5.3)
RBC # BLD: 4.06 M/UL — LOW (ref 4.2–5.8)
RBC # FLD: 13.9 % — SIGNIFICANT CHANGE UP (ref 10.3–14.5)
SARS-COV-2 IGG+IGM SERPL QL IA: 55 U/ML — HIGH
SARS-COV-2 IGG+IGM SERPL QL IA: POSITIVE
SODIUM SERPL-SCNC: 142 MMOL/L — SIGNIFICANT CHANGE UP (ref 135–145)
VALPROATE SERPL-MCNC: 86 UG/ML — SIGNIFICANT CHANGE UP (ref 50–100)
WBC # BLD: 10.87 K/UL — HIGH (ref 3.8–10.5)
WBC # FLD AUTO: 10.87 K/UL — HIGH (ref 3.8–10.5)

## 2021-09-26 PROCEDURE — 95720 EEG PHY/QHP EA INCR W/VEEG: CPT

## 2021-09-26 PROCEDURE — 99233 SBSQ HOSP IP/OBS HIGH 50: CPT

## 2021-09-26 RX ORDER — ESCITALOPRAM OXALATE 10 MG/1
10 TABLET, FILM COATED ORAL DAILY
Refills: 0 | Status: DISCONTINUED | OUTPATIENT
Start: 2021-09-26 | End: 2021-09-28

## 2021-09-26 RX ORDER — LEVETIRACETAM 250 MG/1
1500 TABLET, FILM COATED ORAL
Refills: 0 | Status: DISCONTINUED | OUTPATIENT
Start: 2021-09-26 | End: 2021-09-28

## 2021-09-26 RX ADMIN — ENOXAPARIN SODIUM 40 MILLIGRAM(S): 100 INJECTION SUBCUTANEOUS at 12:14

## 2021-09-26 RX ADMIN — Medication 27.5 MILLIGRAM(S): at 23:39

## 2021-09-26 RX ADMIN — Medication 27.5 MILLIGRAM(S): at 16:36

## 2021-09-26 RX ADMIN — FINASTERIDE 5 MILLIGRAM(S): 5 TABLET, FILM COATED ORAL at 10:27

## 2021-09-26 RX ADMIN — LEVETIRACETAM 400 MILLIGRAM(S): 250 TABLET, FILM COATED ORAL at 05:34

## 2021-09-26 RX ADMIN — LEVETIRACETAM 1500 MILLIGRAM(S): 250 TABLET, FILM COATED ORAL at 17:49

## 2021-09-26 RX ADMIN — ESCITALOPRAM OXALATE 10 MILLIGRAM(S): 10 TABLET, FILM COATED ORAL at 15:06

## 2021-09-26 RX ADMIN — Medication 81 MILLIGRAM(S): at 12:14

## 2021-09-26 RX ADMIN — NEBIVOLOL HYDROCHLORIDE 10 MILLIGRAM(S): 5 TABLET ORAL at 05:34

## 2021-09-26 RX ADMIN — Medication 27.5 MILLIGRAM(S): at 05:34

## 2021-09-26 RX ADMIN — SIMVASTATIN 10 MILLIGRAM(S): 20 TABLET, FILM COATED ORAL at 21:16

## 2021-09-26 NOTE — PROGRESS NOTE ADULT - SUBJECTIVE AND OBJECTIVE BOX
SUBJECTIVE:     INTERVAL HISTORY:    PAST MEDICAL & SURGICAL HISTORY:  Cavernoma  S/p resection     Essential hypertension    Other hyperlipidemia    Partial symptomatic epilepsy with complex partial seizures, not intractable, without status epilepticus  Since  s/p L. temporal cavernoma resection    Status post craniectomy        FAMILY HISTORY:    SOCIAL HISTORY:   T/E/D:   Occupation:   Lives with:     MEDICATIONS (HOME):  Home Medications:  Depakote: 750 milligram(s) orally 2 times a day (25 Sep 2021 21:06)  escitalopram 10 mg oral tablet: 1 tab(s) orally once a day (31 Oct 2020 11:16)  finasteride 5 mg oral tablet: 1 tab(s) orally once a day (31 Oct 2020 11:16)  levETIRAcetam 750 mg oral tablet: 2 tab(s) orally 2 times a day (06 Mar 2021 11:13)  simvastatin 10 mg oral tablet: 1 tab(s) orally once a day (at bedtime) (31 Oct 2020 11:16)    MEDICATIONS  (STANDING):  aspirin  chewable 81 milliGRAM(s) Oral daily  enoxaparin Injectable 40 milliGRAM(s) SubCutaneous daily  escitalopram 10 milliGRAM(s) Oral daily  finasteride 5 milliGRAM(s) Oral daily  influenza   Vaccine 0.5 milliLiter(s) IntraMuscular once  levETIRAcetam 1500 milliGRAM(s) Oral two times a day  nebivolol 10 milliGRAM(s) Oral daily  simvastatin 10 milliGRAM(s) Oral at bedtime  valproate sodium IVPB 500 milliGRAM(s) IV Intermittent every 8 hours    MEDICATIONS  (PRN):  lacosamide Injectable 200 milliGRAM(s) IV Push once PRN seizure activity  LORazepam   Injectable 1 milliGRAM(s) IV Push once PRN seizure activity    ALLERGIES/INTOLERANCES:  Allergies  No Known Allergies    Intolerances    VITALS & EXAMINATION:  Vital Signs Last 24 Hrs  T(C): 36.8 (27 Sep 2021 04:39), Max: 36.9 (26 Sep 2021 19:09)  T(F): 98.3 (27 Sep 2021 04:39), Max: 98.4 (26 Sep 2021 19:09)  HR: 63 (27 Sep 2021 04:39) (62 - 69)  BP: 175/82 (27 Sep 2021 04:39) (147/68 - 179/84)  BP(mean): --  RR: 18 (27 Sep 2021 04:39) (18 - 18)  SpO2: 93% (27 Sep 2021 04:39) (93% - 95%)    General:  Constitutional: Obese Male, appears stated age, in no apparent distress including pain  Head: Normocephalic & atraumatic.  ENT: Patent ear canals, intact TM, mucus membranes moist & pink, neck supple, no lymphadenopathy.   Respiratory: Patent airway. All lung fields are clear to auscultation bilaterally.  Extremities: No cyanosis, clubbing, or edema.  Skin: No rashes, bruising, or discoloration.    Cardiovascular (>2): RRR no murmurs. Carotid pulsations symmetric, no bruits. Normal capillary beds refill, 1-2 seconds or less.     Neurological (>12):  MS: Awake, alert, oriented to person, place, situation, time. Normal affect. Follows all commands.    Language: Speech is clear, fluent with good repetition & comprehension (able to name objects___)    CNs: PERRLA (R = 3mm, L = 3mm). VFF. EOMI no nystagmus, no diplopia. V1-3 intact to LT/pinprick, well developed masseter muscles b/l. No facial asymmetry b/l, full eye closure strength b/l. Hearing grossly normal (rubbing fingers) b/l. Symmetric palate elevation in midline. Gag reflex deferred. Head turning & shoulder shrug intact b/l. Tongue midline, normal movements, no atrophy.    Fundoscopic: pale w/ sharp discs margins No vascular changes.      Motor: Normal muscle bulk & tone. No noticeable tremor or seizure. No pronator drift.              Deltoid	Biceps	Triceps	Wrist	Finger ABd	   R	5	5	5	5	5		5 	  L	5	5	5	5	5		5    	H-Flex	H-Ext	H-ABd	H-ADd	K-Flex	K-Ext	D-Flex	P-Flex  R	5	5	5	5	5	5	5	5 	   L	5	5	5	5	5	5	5	5	     Sensation: Intact to LT/PP/Temp/Vibration/Position b/l throughout.     Cortical: Extinction on DSS (neglect): none    Reflexes:              Biceps(C5)       BR(C6)     Triceps(C7)               Patellar(L4)    Achilles(S1)    Plantar Resp  R	2	          2	             2		        2		    2		Down   L	2	          2	             2		        2		    2		Down     Coordination: intact rapid-alt movements. No dysmetria to FTN/HTS    Gait: Normal Romberg. No postural instability. Normal stance and tandem gait.     LABORATORY:  CBC                       12.6   10.87 )-----------( 182      ( 26 Sep 2021 06:11 )             36.2     Chem     142  |  104  |  13  ----------------------------<  81  4.4   |  24  |  0.88    Ca    8.4      26 Sep 2021 06:11  Phos  3.0       Mg     1.8           LFTs   Coagulopathy   Lipid Panel   A1c   Cardiac enzymes CARDIAC MARKERS ( 25 Sep 2021 17:09 )  x     / x     / 282 U/L / x     / x          U/A Urinalysis Basic - ( 25 Sep 2021 16:27 )    Color: Light Yellow / Appearance: Clear / S.014 / pH: x  Gluc: x / Ketone: Negative  / Bili: Negative / Urobili: Negative   Blood: x / Protein: Trace / Nitrite: Negative   Leuk Esterase: Negative / RBC: 19 /hpf / WBC 0 /HPF   Sq Epi: x / Non Sq Epi: 1 /hpf / Bacteria: Negative      CSF  Immunological  Other    STUDIES & IMAGING:  Studies (EKG, EEG, EMG, etc):     Radiology (XR, CT, MR, U/S, TTE/SAHIL):    < from: CT Head No Cont (21 @ 14:58) >  IMPRESSION:   Mild periventricular white matter ischemia. Focal encephalomalacia and gliosis in the LEFT temporal lobe with overlying craniectomy and craniotomy. Moderate atrophy.    < end of copied text >

## 2021-09-26 NOTE — PROGRESS NOTE ADULT - ASSESSMENT
71M w/ PMHx of meningioma resection in 2003 and seizure disorder on keppra and depakote BIBEMS and wife for weakness (could not take off his shirt) and confusion (did not know name or birthday) around 10:30 AM today. He is on 1500 mg depakote (500 mg at 10 AM and 1000 mg at 10 PM)  and 1500 mg keppra BID at home. Loaded with 1000 mg VPA in ED. CTH w/ L temporal encephalomalacia, moderate atrophy and mild white matter disease    Impression: weakness, confusion, right leg shaking likely 2/2 breakthrough seizure with postictal state    Plan:   -Video EEG  -Valproic acid 750 mg BID  -keppra 1500 mg BID  -Administer  1 mg IV Ativan PRN STAT for seizure activity or GTC > 3 minutes or significant derangement of vital signs.  -Administer 200 mg IV vimpat over 15 minutes for seizures refractory to ativan.  -Toxic/metabolic/infectious work up per primary team- CBC, CMP, urine toxicology, urine cx, blood cx, alcohol level,   -Advise pt not to drive, operate heavy machinery, avoid heights, pools, bathtubs, locked doors.

## 2021-09-26 NOTE — EEG REPORT - NS EEG TEXT BOX
EPILEPSY MONITORING UNIT REPORT   [facility]: [address], [place] [address], Ph#: [phone] [facility]: [address], [place] [address], Ph#: [phone] Office: [address] [place] [id number] Ph#: [phone]  Patient Name: Chele Aviles   Age: 71 years, : 1950 MRN #: [id number], Bess: [id number]  Referring Physician: ER admit   Study Information:  EEG Recording Technique: The patient underwent continuous Video-EEG monitoring, using Telemetry System hardware on the XLTek Digital System. EEG and video data were stored on a computer hard drive with important events saved in digital archive files. The material was reviewed by a physician (electroencephalographer / epileptologist) on a daily basis. Alejandro and seizure detection algorithms were utilized and reviewed. An EEG Technician attended to the patient, and was available throughout daytime work hours.  The epilepsy center neurologist was available in person or on call 24-hours per day.  EEG Placement and Labeling of Electrodes: The EEG was performed utilizing 20 channel referential EEG connections (coronal over temporal over parasagittal montage) using all standard 10-20 electrode placements with EKG, with additional electrodes placed in the inferior temporal region using the modified 10-10 montage electrode placements for elective admissions, or if deemed necessary. Recording was at a sampling rate of 256 samples per second per channel. Time synchronized digital video recording was done simultaneously with EEG recording. A low light infrared camera was used for low light recording.   History:  71M w/ PMHx of left temporal meningioma resection in  and seizure disorder on keppra and depakote BIBEMS and wife for weakness (could not take off his shirt) and confusion (did not know name or birthday) around 10:30 AM today. 5mg IM versed given by EMS en route. Upon ED arrival to ED at 12 PM, pt was shaking his right leg for 10 minutes but there no reported urinary incontinence or tongue biting and pt was noted to be able to move left hand. Pt was looking to the left more than his right and with eyes closed, lethargic, not verbally responsive initially. Later in the day he seems a bit more alert but still lethargic and occasional 1 word replies. Denies any f/c, sick contacts. Last 2 seizures were in 2021 and 2020. Pt typically gets weakness and confused after his seizures. He is on 1500 mg depakote (500 mg at 10 AM and 1000 mg at 10 PM)  and 1500 mg keppra BID at home. Per wife at bedside, pt. is compliant w/ his meds, no known sick contacts, vaccinated for covid.  No recent falls or head trauma. PT is nauseous but no reported fever, chills, headaches per wife. No hx of CVA and not on blood thinners.  Home Antiepileptic Medication and Device  Valproic acid 500/1000 Levetiracetam 1500 BID  Interpretation:  Day 1 – 	Start: 2021  08:00 PM 	End: 2021  08:00 	Duration: 12 hr  00 min  Daily EEG Visual Analysis  FINDINGS:  The background was continuous, spontaneously variable and reactive. During wakefulness, the posterior dominant rhythm consisted of symmetric, well-modulated 9 Hz activity, with amplitude to 30 uV, that attenuated to eye opening.  Low amplitude frontal beta was noted in wakefulness.  Background Slowing: No generalized background slowing was present.  Focal Slowing:  Continuous polymorphic theta and delta slowing over the left frontotemporal region (Fp1/F7/T7).  Sleep Background: Drowsiness was characterized by fragmentation, attenuation, and slowing of the background activity.   Sleep was characterized by the presence of vertex waves, symmetric sleep spindles and K-complexes.  Other Non-Epileptiform Findings: Breach effect, left anterior temporal region characterized by higher amplitude, sharply contoured waves and fast activities.  Activation Procedures:  Hyperventilation was not performed.   Photic stimulation was not performed.  Interictal Epileptiform Activity:  Occasional sharp-wave discharges over the left frontotemporal region (F7/T7).  Events: No events or seizures recorded.  Artifacts: Intermittent myogenic and movement artifacts were noted.  ECG: The heart rate on single channel ECG was predominantly between 60-80 BPM.  AEDs:   Valproic acid 500 IV TID Levetiracetam 1500 BID  EEG Summary:  Abnormal EEG in the awake, drowsy and asleep states.  Occasional sharp-wave discharges over the left frontotemporal region (F7/T7). Continuous polymorphic theta and delta slowing over the left frontotemporal region (Fp1/F7/T7). Breach effect, left anterior temporal region  Impression/Clinical Correlate:  This is an abnormal EEG record.   This indicates a risk of focal-onset seizures from, as well as presence of structural abnormality and skull defect in the left frontotemporal region. No seizures were seen.    Jacinda Hoover MD Epilepsy Attending

## 2021-09-27 ENCOUNTER — NON-APPOINTMENT (OUTPATIENT)
Age: 71
End: 2021-09-27

## 2021-09-27 ENCOUNTER — TRANSCRIPTION ENCOUNTER (OUTPATIENT)
Age: 71
End: 2021-09-27

## 2021-09-27 PROCEDURE — 99233 SBSQ HOSP IP/OBS HIGH 50: CPT

## 2021-09-27 PROCEDURE — 95720 EEG PHY/QHP EA INCR W/VEEG: CPT

## 2021-09-27 RX ORDER — HYDRALAZINE HCL 50 MG
2.5 TABLET ORAL ONCE
Refills: 0 | Status: COMPLETED | OUTPATIENT
Start: 2021-09-27 | End: 2021-09-27

## 2021-09-27 RX ORDER — MIDAZOLAM HYDROCHLORIDE 1 MG/ML
5 INJECTION, SOLUTION INTRAMUSCULAR; INTRAVENOUS ONCE
Refills: 0 | Status: DISCONTINUED | OUTPATIENT
Start: 2021-09-27 | End: 2021-09-28

## 2021-09-27 RX ORDER — DIVALPROEX SODIUM 500 MG/1
750 TABLET, DELAYED RELEASE ORAL
Refills: 0 | Status: DISCONTINUED | OUTPATIENT
Start: 2021-09-27 | End: 2021-09-28

## 2021-09-27 RX ADMIN — Medication 2.5 MILLIGRAM(S): at 02:02

## 2021-09-27 RX ADMIN — Medication 27.5 MILLIGRAM(S): at 06:00

## 2021-09-27 RX ADMIN — NEBIVOLOL HYDROCHLORIDE 10 MILLIGRAM(S): 5 TABLET ORAL at 05:16

## 2021-09-27 RX ADMIN — Medication 81 MILLIGRAM(S): at 11:51

## 2021-09-27 RX ADMIN — LEVETIRACETAM 1500 MILLIGRAM(S): 250 TABLET, FILM COATED ORAL at 05:16

## 2021-09-27 RX ADMIN — LEVETIRACETAM 1500 MILLIGRAM(S): 250 TABLET, FILM COATED ORAL at 17:30

## 2021-09-27 RX ADMIN — FINASTERIDE 5 MILLIGRAM(S): 5 TABLET, FILM COATED ORAL at 11:51

## 2021-09-27 RX ADMIN — DIVALPROEX SODIUM 750 MILLIGRAM(S): 500 TABLET, DELAYED RELEASE ORAL at 17:30

## 2021-09-27 RX ADMIN — ENOXAPARIN SODIUM 40 MILLIGRAM(S): 100 INJECTION SUBCUTANEOUS at 11:52

## 2021-09-27 RX ADMIN — ESCITALOPRAM OXALATE 10 MILLIGRAM(S): 10 TABLET, FILM COATED ORAL at 11:52

## 2021-09-27 RX ADMIN — SIMVASTATIN 10 MILLIGRAM(S): 20 TABLET, FILM COATED ORAL at 21:41

## 2021-09-27 NOTE — DISCHARGE NOTE PROVIDER - NSFOLLOWUPCLINICS_GEN_ALL_ED_FT
[facility]  [facility]rology [facility]  [address] [place] [address]  Phone: [phone]  Fax: [phone]  Established Patient

## 2021-09-27 NOTE — PHYSICAL THERAPY INITIAL EVALUATION ADULT - GENERAL OBSERVATIONS, REHAB EVAL
Rec'd seated in chair, in NAD, +IVL, +vEEG, wife at bedside. Agreeable to PT. RN cleared pt to be seen. A/w weakness, confusion, right leg shaking - possibly postictal

## 2021-09-27 NOTE — PHYSICAL THERAPY INITIAL EVALUATION ADULT - DISCHARGE DISPOSITION, PT EVAL
TBD pending further gait and stair assessment. Pt/wife refusing rehab/homePT options. Only acceptable to Outpatient PT at this time.

## 2021-09-27 NOTE — PHYSICAL THERAPY INITIAL EVALUATION ADULT - PLANNED THERAPY INTERVENTIONS, PT EVAL
Goal: Pt will be able to negotiate one flight of stairs independently with single handrail and step over step pattern in 3 weeks./balance training/bed mobility training/gait training/strengthening/transfer training

## 2021-09-27 NOTE — PHYSICAL THERAPY INITIAL EVALUATION ADULT - PRECAUTIONS/LIMITATIONS, REHAB EVAL
CT Head: Mild periventricular white matter ischemia. Focal encephalomalacia and gliosis in the LEFT temporal lobe with overlying craniectomy and craniotomy. Moderate atrophy./fall precautions CT Head: Mild periventricular white matter ischemia. Focal encephalomalacia and gliosis in the LEFT temporal lobe with overlying craniectomy and craniotomy. Moderate atrophy./fall precautions/seizure precautions

## 2021-09-27 NOTE — DISCHARGE NOTE PROVIDER - NSDCCPCAREPLAN_GEN_ALL_CORE_FT
PRINCIPAL DISCHARGE DIAGNOSIS  Diagnosis: Seizure  Assessment and Plan of Treatment: 1. [facility] law mandates you to self-report your seizure disorder to [facility], and be seizure-free for 1yr before you can drive.   2. Avoid swimming, diving, taking a bath, cooking, use of motarized machineries.  3. Avoid climbing a ladder, trees or any height.  4. Use machines with safety switches.  5. Always be aware of your surroundings and make sure family and friends are aware of your seizures.  6. Use non-breakable dishes.  7. Consider wearing a medical bracelet to inform people you have epilepsy in case of an emergency       PRINCIPAL DISCHARGE DIAGNOSIS  Diagnosis: Seizure  Assessment and Plan of Treatment: You were admitted to the epilepsy monitoring unit and monitored on EEG to look for seizures. You did not have any further seizures seen on EEG. You will need to continue taking your 2 anti-seizure medications as prescribed.  1. [facility] law mandates you to self-report your seizure disorder to [facility], and be seizure-free for 1yr before you can drive.   2. Avoid swimming, diving, taking a bath, cooking, use of motarized machineries.  3. Avoid climbing a ladder, trees or any height.  4. Use machines with safety switches.  5. Always be aware of your surroundings and make sure family and friends are aware of your seizures.  6. Use non-breakable dishes.  7. Consider wearing a medical bracelet to inform people you have epilepsy in case of an emergency

## 2021-09-27 NOTE — PHYSICAL THERAPY INITIAL EVALUATION ADULT - ADDITIONAL COMMENTS
Per pt and wife at bedside, pt lives in house with wife (home at all times with pt). Has 8 steps to enter and 6 steps inside to bedroom/bathroom. Reports pt independent with quad cane for functional mobility and ADLs. Wife reports pt only requires assist with showering.

## 2021-09-27 NOTE — DISCHARGE NOTE PROVIDER - HOSPITAL COURSE
HPI:  71M w/ PMHx of meningioma resection in 2003 and seizure disorder on keppra and depakote BIBEMS and wife for weakness (could not take off his shirt) and confusion (did not know name or birthday) around 10:30 AM today. 5mg IM versed given by EMS en route. Upon ED arrival to ED at 12 PM, pt was shaking his right leg for 10 minutes but there no reported urinary incontinence or tongue biting and pt was noted to be able to move left hand. Pt was looking to the left more than his right and with eyes closed, lethargic, not verbally responsive initially. Later in the day he seems a bit more alert but still lethargic and occasional 1 word replies. Denies any f/c, sick contacts. Last 2 seizures were in March 2021 and October 2020. Pt typically gets weakness and confused after his seizures. He is on 1500 mg depakote (500 mg at 10 AM and 1000 mg at 10 PM)  and 1500 mg keppra BID at home. Per wife at bedside, pt. is compliant w/ his meds, no known sick contacts, vaccinated for covid.  No recent falls or head trauma. PT is nauseous but no reported fever, chills, headaches per wife. No hx of CVA and not on blood thinners.     Loaded with 1000 mg VPA in ED.    Hospital Course:   HPI:  71M w/ PMHx of meningioma resection in 2003 and seizure disorder on keppra and depakote BIBEMS and wife for weakness (could not take off his shirt) and confusion (did not know name or birthday) around 10:30 AM today. 5mg IM versed given by EMS en route. Upon ED arrival to ED at 12 PM, pt was shaking his right leg for 10 minutes but there no reported urinary incontinence or tongue biting and pt was noted to be able to move left hand. Pt was looking to the left more than his right and with eyes closed, lethargic, not verbally responsive initially. Later in the day he seems a bit more alert but still lethargic and occasional 1 word replies. Denies any f/c, sick contacts. Last 2 seizures were in March 2021 and October 2020. Pt typically gets weakness and confused after his seizures. He is on 1500 mg depakote (500 mg at 10 AM and 1000 mg at 10 PM)  and 1500 mg keppra BID at home. Per wife at bedside, pt. is compliant w/ his meds, no known sick contacts, vaccinated for covid.  No recent falls or head trauma. PT is nauseous but no reported fever, chills, headaches per wife. No hx of CVA and not on blood thinners.     Loaded with 1000 mg VPA in ED.    Hospital Course:  Pt monitored on vEEG for 3 days. No seizures captured. Pt was discharged on home dose of keppra 1500 BID and depakote ER 750mg BID. Infectious workup neg (no fever, no leukocytosis, CXR neg, UA neg)    EEG Summary:    Abnormal EEG in the awake, drowsy and asleep states.    Occasional sharp-wave discharges over the left frontotemporal region (F7/T7).  Continuous polymorphic theta and delta slowing over the left frontotemporal region (Fp1/F7/T7).  Breach effect, left anterior temporal region    Impression/Clinical Correlate:    This is an abnormal EEG record.     This indicates a risk of focal-onset seizures from, as well as presence of structural abnormality and skull defect in the left frontotemporal region. No seizures were seen.

## 2021-09-27 NOTE — DISCHARGE NOTE PROVIDER - NSDCMRMEDTOKEN_GEN_ALL_CORE_FT
Depakote: 750 milligram(s) orally 2 times a day  escitalopram 10 mg oral tablet: 1 tab(s) orally once a day  finasteride 5 mg oral tablet: 1 tab(s) orally once a day  levETIRAcetam 750 mg oral tablet: 2 tab(s) orally 2 times a day  Outpatient Physical Therapy: Outpatient PT  simvastatin 10 mg oral tablet: 1 tab(s) orally once a day (at bedtime)   divalproex sodium 250 mg oral delayed release tablet: 3 tab(s) orally 2 times a day  escitalopram 10 mg oral tablet: 1 tab(s) orally once a day  finasteride 5 mg oral tablet: 1 tab(s) orally once a day  levETIRAcetam 750 mg oral tablet: 2 tab(s) orally 2 times a day  simvastatin 10 mg oral tablet: 1 tab(s) orally once a day (at bedtime)

## 2021-09-27 NOTE — EEG REPORT - NS EEG TEXT BOX
EPILEPSY MONITORING UNIT REPORT   [facility]: [address], [place] [address], Ph#: [phone] [facility]: [address], [place] [address], Ph#: [phone] Office: [address] [place] [address] Ph#: [phone]  Patient Name: Chele Aviles   Age: 71 years, : 1950 MRN #: [id number], Bess: U 464 D  Referring Physician: ER admit   Study Information:  EEG Recording Technique: The patient underwent continuous Video-EEG monitoring, using Telemetry System hardware on the XLTek Digital System. EEG and video data were stored on a computer hard drive with important events saved in digital archive files. The material was reviewed by a physician (electroencephalographer / epileptologist) on a daily basis. Alejandro and seizure detection algorithms were utilized and reviewed. An EEG Technician attended to the patient, and was available throughout daytime work hours.  The epilepsy center neurologist was available in person or on call 24-hours per day.  EEG Placement and Labeling of Electrodes: The EEG was performed utilizing 20 channel referential EEG connections (coronal over temporal over parasagittal montage) using all standard 10-20 electrode placements with EKG, with additional electrodes placed in the inferior temporal region using the modified 10-10 montage electrode placements for elective admissions, or if deemed necessary. Recording was at a sampling rate of 256 samples per second per channel. Time synchronized digital video recording was done simultaneously with EEG recording. A low light infrared camera was used for low light recording.   History:  71M w/ PMHx of left temporal meningioma resection in  and seizure disorder on keppra and depakote BIBEMS and wife for weakness (could not take off his shirt) and confusion (did not know name or birthday) around 10:30 AM today. 5mg IM versed given by EMS en route. Upon ED arrival to ED at 12 PM, pt was shaking his right leg for 10 minutes but there no reported urinary incontinence or tongue biting and pt was noted to be able to move left hand. Pt was looking to the left more than his right and with eyes closed, lethargic, not verbally responsive initially. Later in the day he seems a bit more alert but still lethargic and occasional 1 word replies. Denies any f/c, sick contacts. Last 2 seizures were in 2021 and 2020. Pt typically gets weakness and confused after his seizures. He is on 1500 mg depakote (500 mg at 10 AM and 1000 mg at 10 PM)  and 1500 mg keppra BID at home. Per wife at bedside, pt. is compliant w/ his meds, no known sick contacts, vaccinated for covid.  No recent falls or head trauma. PT is nauseous but no reported fever, chills, headaches per wife. No hx of CVA and not on blood thinners.  Home Antiepileptic Medication and Device  Valproic acid 500/1000 Levetiracetam 1500 BID  Interpretation:  Day 1 – 	Start: 2021  08:00  	End: 2021  08:00 	Duration: 24 hr  00 min  Daily EEG Visual Analysis  FINDINGS:  The background was continuous, spontaneously variable and reactive. During wakefulness, the posterior dominant rhythm consisted of symmetric, well-modulated 9 Hz activity, with amplitude to 30 uV, that attenuated to eye opening.  Low amplitude frontal beta was noted in wakefulness.  Background Slowing: No generalized background slowing was present.  Focal Slowing:  Continuous polymorphic theta and delta slowing over the left frontotemporal region (Fp1/F7/T7).  Sleep Background: Drowsiness was characterized by fragmentation, attenuation, and slowing of the background activity.   Sleep was characterized by the presence of vertex waves, symmetric sleep spindles and K-complexes.  Other Non-Epileptiform Findings: Breach effect, left anterior temporal region characterized by higher amplitude, sharply contoured waves and fast activities.  Activation Procedures:  Hyperventilation was not performed.   Photic stimulation was not performed.  Interictal Epileptiform Activity:  Occasional sharp-wave discharges over the left frontotemporal region (F7/T7).  Events: No events or seizures recorded.  Artifacts: Intermittent myogenic and movement artifacts were noted.  ECG: The heart rate on single channel ECG was predominantly between 60-80 BPM.  AEDs:   Valproic acid 500 IV TID Levetiracetam 1500 BID  EEG Summary:  Abnormal EEG in the awake, drowsy and asleep states.  Occasional sharp-wave discharges over the left frontotemporal region (F7/T7). Continuous polymorphic theta and delta slowing over the left frontotemporal region (Fp1/F7/T7). Breach effect, left anterior temporal region  Impression/Clinical Correlate:  This is an abnormal EEG record.   This indicates a risk of focal-onset seizures from, as well as presence of structural abnormality and skull defect in the left frontotemporal region. No seizures were seen.  *** PRELIMINARY REPORT - PENDING EPILEPSY ATTENDING REVIEW ***   Panchito Cardoza MD, LACHELLE Fellow, [facility]ile[facility] [place]   ------------------------------------ EEG Reading Room: [phone] On Call Service After Hours: [phone]       EPILEPSY MONITORING UNIT REPORT   [facility]: [address], [place] [address], Ph#: [phone] [facility]: [address], [place] [address], Ph#: [phone] Office: [address] [place] [address] Ph#: [phone]  Patient Name: Chele Aviles   Age: 71 years, : 1950 MRN #: [id number], Bess: U 464 D  Referring Physician: ER admit   Study Information:  EEG Recording Technique: The patient underwent continuous Video-EEG monitoring, using Telemetry System hardware on the XLTek Digital System. EEG and video data were stored on a computer hard drive with important events saved in digital archive files. The material was reviewed by a physician (electroencephalographer / epileptologist) on a daily basis. Alejandro and seizure detection algorithms were utilized and reviewed. An EEG Technician attended to the patient, and was available throughout daytime work hours.  The epilepsy center neurologist was available in person or on call 24-hours per day.  EEG Placement and Labeling of Electrodes: The EEG was performed utilizing 20 channel referential EEG connections (coronal over temporal over parasagittal montage) using all standard 10-20 electrode placements with EKG, with additional electrodes placed in the inferior temporal region using the modified 10-10 montage electrode placements for elective admissions, or if deemed necessary. Recording was at a sampling rate of 256 samples per second per channel. Time synchronized digital video recording was done simultaneously with EEG recording. A low light infrared camera was used for low light recording.   History:  71M w/ PMHx of left temporal meningioma resection in  and seizure disorder on keppra and depakote BIBEMS and wife for weakness (could not take off his shirt) and confusion (did not know name or birthday) around 10:30 AM today. 5mg IM versed given by EMS en route. Upon ED arrival to ED at 12 PM, pt was shaking his right leg for 10 minutes but there no reported urinary incontinence or tongue biting and pt was noted to be able to move left hand. Pt was looking to the left more than his right and with eyes closed, lethargic, not verbally responsive initially. Later in the day he seems a bit more alert but still lethargic and occasional 1 word replies. Denies any f/c, sick contacts. Last 2 seizures were in 2021 and 2020. Pt typically gets weakness and confused after his seizures. He is on 1500 mg depakote (500 mg at 10 AM and 1000 mg at 10 PM)  and 1500 mg keppra BID at home. Per wife at bedside, pt. is compliant w/ his meds, no known sick contacts, vaccinated for covid.  No recent falls or head trauma. PT is nauseous but no reported fever, chills, headaches per wife. No hx of CVA and not on blood thinners.  Home Antiepileptic Medication and Device  Valproic acid 500/1000 Levetiracetam 1500 BID  Interpretation:  Day 1 – 	Start: 2021  08:00  	End: 2021  08:00 	Duration: 24 hr  00 min  Daily EEG Visual Analysis  FINDINGS:  The background was continuous, spontaneously variable and reactive. During wakefulness, the posterior dominant rhythm consisted of symmetric, well-modulated 9 Hz activity, with amplitude to 30 uV, that attenuated to eye opening.  Low amplitude frontal beta was noted in wakefulness.  Background Slowing: No generalized background slowing was present.  Focal Slowing:  Continuous polymorphic theta and delta slowing over the left frontotemporal region (Fp1/F7/T7).  Sleep Background: Drowsiness was characterized by fragmentation, attenuation, and slowing of the background activity.   Sleep was characterized by the presence of vertex waves, symmetric sleep spindles and K-complexes.  Other Non-Epileptiform Findings: Breach effect, left anterior temporal region characterized by higher amplitude, sharply contoured waves and fast activities.  Activation Procedures:  Hyperventilation was not performed.   Photic stimulation was not performed.  Interictal Epileptiform Activity:  Occasional sharp-wave discharges over the left frontotemporal region (F7/T7).  Events: No events or seizures recorded.  Artifacts: Intermittent myogenic and movement artifacts were noted.  ECG: The heart rate on single channel ECG was predominantly between 60-80 BPM.  AEDs:   Valproic acid 500 IV TID Levetiracetam 1500 BID  EEG Summary:  Abnormal EEG in the awake, drowsy and asleep states.  Occasional sharp-wave discharges over the left frontotemporal region (F7/T7). Continuous polymorphic theta and delta slowing over the left frontotemporal region (Fp1/F7/T7). Breach effect, left anterior temporal region  Impression/Clinical Correlate:  This is an abnormal EEG record.   This indicates a risk of focal-onset seizures from, as well as presence of structural abnormality and skull defect in the left frontotemporal region. No seizures were seen.    Panchito Cardoza MD, LACHELLE Fellow, [facility]  Linus Kennedy MD PhD Director, [facility], [facility]   ------------------------------------ EEG Reading Room: [phone] On Call Service After Hours: [phone]       EPILEPSY MONITORING UNIT REPORT   [facility]: [address], [place] [address], Ph#: [phone] [facility]: [address], [place] [address], Ph#: [phone] Office: [address] [place] [address] Ph#: [phone]  Patient Name: Chele Aviles   Age: 71 years, : 1950 MRN #: [id number], Bess: U 464 D  Referring Physician: ER admit   Study Information:  EEG Recording Technique: The patient underwent continuous Video-EEG monitoring, using Telemetry System hardware on the XLTek Digital System. EEG and video data were stored on a computer hard drive with important events saved in digital archive files. The material was reviewed by a physician (electroencephalographer / epileptologist) on a daily basis. Alejandro and seizure detection algorithms were utilized and reviewed. An EEG Technician attended to the patient, and was available throughout daytime work hours.  The epilepsy center neurologist was available in person or on call 24-hours per day.  EEG Placement and Labeling of Electrodes: The EEG was performed utilizing 20 channel referential EEG connections (coronal over temporal over parasagittal montage) using all standard 10-20 electrode placements with EKG, with additional electrodes placed in the inferior temporal region using the modified 10-10 montage electrode placements for elective admissions, or if deemed necessary. Recording was at a sampling rate of 256 samples per second per channel. Time synchronized digital video recording was done simultaneously with EEG recording. A low light infrared camera was used for low light recording.   History:  71M w/ PMHx of left temporal meningioma resection in  and seizure disorder on keppra and depakote BIBEMS and wife for weakness (could not take off his shirt) and confusion (did not know name or birthday) around 10:30 AM today. 5mg IM versed given by EMS en route. Upon ED arrival to ED at 12 PM, pt was shaking his right leg for 10 minutes but there no reported urinary incontinence or tongue biting and pt was noted to be able to move left hand. Pt was looking to the left more than his right and with eyes closed, lethargic, not verbally responsive initially. Later in the day he seems a bit more alert but still lethargic and occasional 1 word replies. Denies any f/c, sick contacts. Last 2 seizures were in 2021 and 2020. Pt typically gets weakness and confused after his seizures. He is on 1500 mg depakote (500 mg at 10 AM and 1000 mg at 10 PM)  and 1500 mg keppra BID at home. Per wife at bedside, pt. is compliant w/ his meds, no known sick contacts, vaccinated for covid.  No recent falls or head trauma. PT is nauseous but no reported fever, chills, headaches per wife. No hx of CVA and not on blood thinners.  Home Antiepileptic Medication and Device  Valproic acid 500/1000 Levetiracetam 1500 BID  Interpretation:  Day 2 – 	Start: 2021  08:00  	End: 2021  08:00 	Duration: 24 hr  00 min  Daily EEG Visual Analysis  FINDINGS:  The background was continuous, spontaneously variable and reactive. During wakefulness, the posterior dominant rhythm consisted of symmetric, well-modulated 9 Hz activity, with amplitude to 30 uV, that attenuated to eye opening.  Low amplitude frontal beta was noted in wakefulness.  Background Slowing: No generalized background slowing was present.  Focal Slowing:  Continuous polymorphic theta and delta slowing over the left frontotemporal region (Fp1/F7/T7).  Sleep Background: Drowsiness was characterized by fragmentation, attenuation, and slowing of the background activity.   Sleep was characterized by the presence of vertex waves, symmetric sleep spindles and K-complexes.  Other Non-Epileptiform Findings: Breach effect, left anterior temporal region characterized by higher amplitude, sharply contoured waves and fast activities.  Activation Procedures:  Hyperventilation was not performed.   Photic stimulation was not performed.  Interictal Epileptiform Activity:  Occasional sharp-wave discharges over the left frontotemporal region (F7/T7).  Events: No events or seizures recorded.  Artifacts: Intermittent myogenic and movement artifacts were noted.  ECG: The heart rate on single channel ECG was predominantly between 60-80 BPM.  AEDs:   Valproic acid 500 IV TID Levetiracetam 1500 BID  EEG Summary:  Abnormal EEG in the awake, drowsy and asleep states.  Occasional sharp-wave discharges over the left frontotemporal region (F7/T7). Continuous polymorphic theta and delta slowing over the left frontotemporal region (Fp1/F7/T7). Breach effect, left anterior temporal region  Impression/Clinical Correlate:  This is an abnormal EEG record.   This indicates a risk of focal-onset seizures from, as well as presence of structural abnormality and skull defect in the left frontotemporal region. No seizures were seen.    Panchito Cardoza MD, LACHELLE Fellow, [facility]  Linus Kennedy MD PhD Director, Epilepsy Division, [facility]   ------------------------------------ EEG Reading Room: [phone] On Call Service After Hours: [phone]

## 2021-09-27 NOTE — DISCHARGE NOTE PROVIDER - NSDCFUSCHEDAPPT_GEN_ALL_CORE_FT
MAURO NUNEZ ; 10/29/2021 ; NPP Neuro [address] [facility]  MAURO NUNEZ ; 12/21/2021 ; NPP Med GenInt [address] [facility]

## 2021-09-27 NOTE — PHYSICAL THERAPY INITIAL EVALUATION ADULT - PERTINENT HX OF CURRENT PROBLEM, REHAB EVAL
71M w/ PMHx of meningioma resection in 2003 and seizure disorder on keppra and depakote BIBEMS and wife for weakness (could not take off his shirt) and confusion (did not know name or birthday) around 10:30 AM today. He is on 1500 mg depakote (500 mg at 10 AM and 1000 mg at 10 PM)  and 1500 mg keppra BID at home. Loaded with 1000 mg VPA in ED. CTH w/ L temporal encephalomalacia, moderate atrophy and mild white matter disease.
[de-identified] : Lesion in groin area, similar to in past; painful\par Had drainage, culture in this area in 2017

## 2021-09-27 NOTE — PROGRESS NOTE ADULT - SUBJECTIVE AND OBJECTIVE BOX
SUBJECTIVE:     INTERVAL HISTORY:    PAST MEDICAL & SURGICAL HISTORY:  Cavernoma  S/p resection     Essential hypertension    Other hyperlipidemia    Partial symptomatic epilepsy with complex partial seizures, not intractable, without status epilepticus  Since  s/p L. temporal cavernoma resection    Status post craniectomy        FAMILY HISTORY:    SOCIAL HISTORY:   T/E/D:   Occupation:   Lives with:     MEDICATIONS (HOME):  Home Medications:  Depakote: 750 milligram(s) orally 2 times a day (25 Sep 2021 21:06)  escitalopram 10 mg oral tablet: 1 tab(s) orally once a day (31 Oct 2020 11:16)  finasteride 5 mg oral tablet: 1 tab(s) orally once a day (31 Oct 2020 11:16)  levETIRAcetam 750 mg oral tablet: 2 tab(s) orally 2 times a day (06 Mar 2021 11:13)  simvastatin 10 mg oral tablet: 1 tab(s) orally once a day (at bedtime) (31 Oct 2020 11:16)    MEDICATIONS  (STANDING):  aspirin  chewable 81 milliGRAM(s) Oral daily  enoxaparin Injectable 40 milliGRAM(s) SubCutaneous daily  escitalopram 10 milliGRAM(s) Oral daily  finasteride 5 milliGRAM(s) Oral daily  influenza   Vaccine 0.5 milliLiter(s) IntraMuscular once  levETIRAcetam 1500 milliGRAM(s) Oral two times a day  nebivolol 10 milliGRAM(s) Oral daily  simvastatin 10 milliGRAM(s) Oral at bedtime  valproate sodium IVPB 500 milliGRAM(s) IV Intermittent every 8 hours    MEDICATIONS  (PRN):  lacosamide Injectable 200 milliGRAM(s) IV Push once PRN seizure activity  LORazepam   Injectable 1 milliGRAM(s) IV Push once PRN seizure activity    ALLERGIES/INTOLERANCES:  Allergies  No Known Allergies    Intolerances    VITALS & EXAMINATION:  Vital Signs Last 24 Hrs  T(C): 36.8 (27 Sep 2021 04:39), Max: 36.9 (26 Sep 2021 19:09)  T(F): 98.3 (27 Sep 2021 04:39), Max: 98.4 (26 Sep 2021 19:09)  HR: 63 (27 Sep 2021 04:39) (62 - 69)  BP: 175/82 (27 Sep 2021 04:39) (147/68 - 179/84)  BP(mean): --  RR: 18 (27 Sep 2021 04:39) (18 - 18)  SpO2: 93% (27 Sep 2021 04:39) (93% - 95%)    General:  Constitutional: Obese Male, appears stated age, in no apparent distress including pain  Head: Normocephalic & atraumatic.  ENT: Patent ear canals, intact TM, mucus membranes moist & pink, neck supple, no lymphadenopathy.   Respiratory: Patent airway. All lung fields are clear to auscultation bilaterally.  Extremities: No cyanosis, clubbing, or edema.  Skin: No rashes, bruising, or discoloration.    Cardiovascular (>2): RRR no murmurs. Carotid pulsations symmetric, no bruits. Normal capillary beds refill, 1-2 seconds or less.     Neurological (>12):  MS: Awake, alert, oriented to person, place, situation, time. Normal affect. Follows all commands.    Language: Speech is clear, fluent with good repetition & comprehension (able to name objects___)    CNs: PERRLA (R = 3mm, L = 3mm). VFF. EOMI no nystagmus, no diplopia. V1-3 intact to LT/pinprick, well developed masseter muscles b/l. No facial asymmetry b/l, full eye closure strength b/l. Hearing grossly normal (rubbing fingers) b/l. Symmetric palate elevation in midline. Gag reflex deferred. Head turning & shoulder shrug intact b/l. Tongue midline, normal movements, no atrophy.    Fundoscopic: pale w/ sharp discs margins No vascular changes.      Motor: Normal muscle bulk & tone. No noticeable tremor or seizure. No pronator drift.              Deltoid	Biceps	Triceps	Wrist	Finger ABd	   R	5	5	5	5	5		5 	  L	5	5	5	5	5		5    	H-Flex	H-Ext	H-ABd	H-ADd	K-Flex	K-Ext	D-Flex	P-Flex  R	5	5	5	5	5	5	5	5 	   L	5	5	5	5	5	5	5	5	     Sensation: Intact to LT/PP/Temp/Vibration/Position b/l throughout.     Cortical: Extinction on DSS (neglect): none    Reflexes:              Biceps(C5)       BR(C6)     Triceps(C7)               Patellar(L4)    Achilles(S1)    Plantar Resp  R	2	          2	             2		        2		    2		Down   L	2	          2	             2		        2		    2		Down     Coordination: intact rapid-alt movements. No dysmetria to FTN/HTS    Gait: Normal Romberg. No postural instability. Normal stance and tandem gait.     LABORATORY:  CBC                       12.6   10.87 )-----------( 182      ( 26 Sep 2021 06:11 )             36.2     Chem     142  |  104  |  13  ----------------------------<  81  4.4   |  24  |  0.88    Ca    8.4      26 Sep 2021 06:11  Phos  3.0       Mg     1.8           LFTs   Coagulopathy   Lipid Panel   A1c   Cardiac enzymes CARDIAC MARKERS ( 25 Sep 2021 17:09 )  x     / x     / 282 U/L / x     / x          U/A Urinalysis Basic - ( 25 Sep 2021 16:27 )    Color: Light Yellow / Appearance: Clear / S.014 / pH: x  Gluc: x / Ketone: Negative  / Bili: Negative / Urobili: Negative   Blood: x / Protein: Trace / Nitrite: Negative   Leuk Esterase: Negative / RBC: 19 /hpf / WBC 0 /HPF   Sq Epi: x / Non Sq Epi: 1 /hpf / Bacteria: Negative      CSF  Immunological  Other    STUDIES & IMAGING:  Studies (EKG, EEG, EMG, etc):     Radiology (XR, CT, MR, U/S, TTE/SAHIL):    < from: CT Head No Cont (21 @ 14:58) >  IMPRESSION:   Mild periventricular white matter ischemia. Focal encephalomalacia and gliosis in the LEFT temporal lobe with overlying craniectomy and craniotomy. Moderate atrophy.    < end of copied text >   SUBJECTIVE: Pt seen and examined at bedside. He denies any acute overnight events.     INTERVAL HISTORY:    PAST MEDICAL & SURGICAL HISTORY:  Cavernoma  S/p resection     Essential hypertension    Other hyperlipidemia    Partial symptomatic epilepsy with complex partial seizures, not intractable, without status epilepticus  Since  s/p L. temporal cavernoma resection    Status post craniectomy        FAMILY HISTORY:    SOCIAL HISTORY:   T/E/D:   Occupation:   Lives with:     MEDICATIONS (HOME):  Home Medications:  Depakote: 750 milligram(s) orally 2 times a day (25 Sep 2021 21:06)  escitalopram 10 mg oral tablet: 1 tab(s) orally once a day (31 Oct 2020 11:16)  finasteride 5 mg oral tablet: 1 tab(s) orally once a day (31 Oct 2020 11:16)  levETIRAcetam 750 mg oral tablet: 2 tab(s) orally 2 times a day (06 Mar 2021 11:13)  simvastatin 10 mg oral tablet: 1 tab(s) orally once a day (at bedtime) (31 Oct 2020 11:16)    MEDICATIONS  (STANDING):  aspirin  chewable 81 milliGRAM(s) Oral daily  enoxaparin Injectable 40 milliGRAM(s) SubCutaneous daily  escitalopram 10 milliGRAM(s) Oral daily  finasteride 5 milliGRAM(s) Oral daily  influenza   Vaccine 0.5 milliLiter(s) IntraMuscular once  levETIRAcetam 1500 milliGRAM(s) Oral two times a day  nebivolol 10 milliGRAM(s) Oral daily  simvastatin 10 milliGRAM(s) Oral at bedtime  valproate sodium IVPB 500 milliGRAM(s) IV Intermittent every 8 hours    MEDICATIONS  (PRN):  lacosamide Injectable 200 milliGRAM(s) IV Push once PRN seizure activity  LORazepam   Injectable 1 milliGRAM(s) IV Push once PRN seizure activity    ALLERGIES/INTOLERANCES:  Allergies  No Known Allergies    Intolerances    VITALS & EXAMINATION:  Vital Signs Last 24 Hrs  T(C): 36.8 (27 Sep 2021 04:39), Max: 36.9 (26 Sep 2021 19:09)  T(F): 98.3 (27 Sep 2021 04:39), Max: 98.4 (26 Sep 2021 19:09)  HR: 63 (27 Sep 2021 04:39) (62 - 69)  BP: 175/82 (27 Sep 2021 04:39) (147/68 - 179/84)  BP(mean): --  RR: 18 (27 Sep 2021 04:39) (18 - 18)  SpO2: 93% (27 Sep 2021 04:39) (93% - 95%)    General:  Constitutional: Obese Male, appears stated age, in no apparent distress including pain  Head: Normocephalic & atraumatic.  Respiratory: No increased work of breathing  Extremities: No cyanosis, clubbing, or edema.    Neurological (>12):  MS: Awake, alert. Normal affect. Follows all commands.    Language: Speech is clear, fluent with impaired repetition & comprehension (cannot name eyebrow, can name thumb)    CNs: EOMI no nystagmus, no diplopia. No facial asymmetry b/l    Motor: Normal muscle bulk & tone. No noticeable tremor or seizure. No pronator drift.              Deltoid	Biceps	Triceps	Wrist	Finger ABd	   R	5	5	5	5			5 	  L	5	5	5	5			5    	H-Flex	K-Flex	K-Ext	D-Flex	P-Flex  R	5	5	5	5	5	 	   L	5	5	5	5	5		    Sensation: Intact to LT b/l throughout.     Coordination: deferred    Gait: deferred    LABORATORY:  CBC                       12.6   10.87 )-----------( 182      ( 26 Sep 2021 06:11 )             36.2     Chem 09-    142  |  104  |  13  ----------------------------<  81  4.4   |  24  |  0.88    Ca    8.4      26 Sep 2021 06:11  Phos  3.0     09-25  Mg     1.8     09-25      LFTs   Coagulopathy   Lipid Panel   A1c   Cardiac enzymes CARDIAC MARKERS ( 25 Sep 2021 17:09 )  x     / x     / 282 U/L / x     / x          U/A Urinalysis Basic - ( 25 Sep 2021 16:27 )    Color: Light Yellow / Appearance: Clear / S.014 / pH: x  Gluc: x / Ketone: Negative  / Bili: Negative / Urobili: Negative   Blood: x / Protein: Trace / Nitrite: Negative   Leuk Esterase: Negative / RBC: 19 /hpf / WBC 0 /HPF   Sq Epi: x / Non Sq Epi: 1 /hpf / Bacteria: Negative      CSF  Immunological  Other    STUDIES & IMAGING:  Studies (EKG, EEG, EMG, etc):     Radiology (XR, CT, MR, U/S, TTE/SAHIL):    < from: CT Head No Cont (21 @ 14:58) >  IMPRESSION:   Mild periventricular white matter ischemia. Focal encephalomalacia and gliosis in the LEFT temporal lobe with overlying craniectomy and craniotomy. Moderate atrophy.    < end of copied text >

## 2021-09-27 NOTE — PROGRESS NOTE ADULT - ASSESSMENT
71M w/ PMHx of meningioma resection in 2003 and seizure disorder on keppra and depakote BIBEMS and wife for weakness (could not take off his shirt) and confusion (did not know name or birthday) around 10:30 AM today. He is on 1500 mg depakote (500 mg at 10 AM and 1000 mg at 10 PM)  and 1500 mg keppra BID at home. Loaded with 1000 mg VPA in ED. CTH w/ L temporal encephalomalacia, moderate atrophy and mild white matter disease    Impression: weakness, confusion, right leg shaking likely 2/2 breakthrough seizure with postictal state    Plan:   -Video EEG  -Valproic acid 750 mg BID  -keppra 1500 mg BID  -Administer  1 mg IV Ativan PRN STAT for seizure activity or GTC > 3 minutes or significant derangement of vital signs.  -Administer 200 mg IV vimpat over 15 minutes for seizures refractory to ativan.  -Toxic/metabolic/infectious work up per primary team- CBC, CMP, urine toxicology, urine cx, blood cx, alcohol level,   -Advise pt not to drive, operate heavy machinery, avoid heights, pools, bathtubs, locked doors.  71M w/ PMHx of meningioma resection in 2003 and seizure disorder on keppra and depakote BIBEMS and wife for weakness (could not take off his shirt) and confusion (did not know name or birthday) around 10:30 AM today. He is on 1500 mg depakote (500 mg at 10 AM and 1000 mg at 10 PM)  and 1500 mg keppra BID at home. Loaded with 1000 mg VPA in ED. CTH w/ L temporal encephalomalacia, moderate atrophy and mild white matter disease    Impression: weakness, confusion, right leg shaking likely 2/2 breakthrough seizure with postictal state    Plan:   -Video EEG  -Valproic acid 750 mg BID  -keppra 1500 mg BID  -Administer  1 mg IV Ativan PRN STAT for seizure activity or GTC > 3 minutes or significant derangement of vital signs.  -Administer 200 mg IV vimpat over 15 minutes for seizures refractory to ativan.  -Advise pt not to drive, operate heavy machinery, avoid heights, pools, bathtubs, locked doors.

## 2021-09-28 ENCOUNTER — TRANSCRIPTION ENCOUNTER (OUTPATIENT)
Age: 71
End: 2021-09-28

## 2021-09-28 VITALS
TEMPERATURE: 98 F | DIASTOLIC BLOOD PRESSURE: 89 MMHG | HEART RATE: 64 BPM | SYSTOLIC BLOOD PRESSURE: 182 MMHG | RESPIRATION RATE: 18 BRPM | OXYGEN SATURATION: 100 %

## 2021-09-28 PROCEDURE — 95700 EEG CONT REC W/VID EEG TECH: CPT

## 2021-09-28 PROCEDURE — 99285 EMERGENCY DEPT VISIT HI MDM: CPT

## 2021-09-28 PROCEDURE — 99238 HOSP IP/OBS DSCHRG MGMT 30/<: CPT

## 2021-09-28 PROCEDURE — 81001 URINALYSIS AUTO W/SCOPE: CPT

## 2021-09-28 PROCEDURE — 93005 ELECTROCARDIOGRAM TRACING: CPT

## 2021-09-28 PROCEDURE — 84100 ASSAY OF PHOSPHORUS: CPT

## 2021-09-28 PROCEDURE — 86803 HEPATITIS C AB TEST: CPT

## 2021-09-28 PROCEDURE — 82803 BLOOD GASES ANY COMBINATION: CPT

## 2021-09-28 PROCEDURE — 82550 ASSAY OF CK (CPK): CPT

## 2021-09-28 PROCEDURE — 83605 ASSAY OF LACTIC ACID: CPT

## 2021-09-28 PROCEDURE — 0225U NFCT DS DNA&RNA 21 SARSCOV2: CPT

## 2021-09-28 PROCEDURE — 95718 EEG PHYS/QHP 2-12 HR W/VEEG: CPT

## 2021-09-28 PROCEDURE — 80048 BASIC METABOLIC PNL TOTAL CA: CPT

## 2021-09-28 PROCEDURE — 95715 VEEG EA 12-26HR INTMT MNTR: CPT

## 2021-09-28 PROCEDURE — 70450 CT HEAD/BRAIN W/O DYE: CPT | Mod: MA

## 2021-09-28 PROCEDURE — 84295 ASSAY OF SERUM SODIUM: CPT

## 2021-09-28 PROCEDURE — 84132 ASSAY OF SERUM POTASSIUM: CPT

## 2021-09-28 PROCEDURE — 85025 COMPLETE CBC W/AUTO DIFF WBC: CPT

## 2021-09-28 PROCEDURE — 82435 ASSAY OF BLOOD CHLORIDE: CPT

## 2021-09-28 PROCEDURE — 82330 ASSAY OF CALCIUM: CPT

## 2021-09-28 PROCEDURE — 80164 ASSAY DIPROPYLACETIC ACD TOT: CPT

## 2021-09-28 PROCEDURE — 71045 X-RAY EXAM CHEST 1 VIEW: CPT

## 2021-09-28 PROCEDURE — 80177 DRUG SCRN QUAN LEVETIRACETAM: CPT

## 2021-09-28 PROCEDURE — 85018 HEMOGLOBIN: CPT

## 2021-09-28 PROCEDURE — 36415 COLL VENOUS BLD VENIPUNCTURE: CPT

## 2021-09-28 PROCEDURE — 86769 SARS-COV-2 COVID-19 ANTIBODY: CPT

## 2021-09-28 PROCEDURE — 95712 VEEG 2-12 HR INTMT MNTR: CPT

## 2021-09-28 PROCEDURE — 96374 THER/PROPH/DIAG INJ IV PUSH: CPT

## 2021-09-28 PROCEDURE — 82962 GLUCOSE BLOOD TEST: CPT

## 2021-09-28 PROCEDURE — 85014 HEMATOCRIT: CPT

## 2021-09-28 PROCEDURE — 97166 OT EVAL MOD COMPLEX 45 MIN: CPT

## 2021-09-28 PROCEDURE — 83735 ASSAY OF MAGNESIUM: CPT

## 2021-09-28 PROCEDURE — 82947 ASSAY GLUCOSE BLOOD QUANT: CPT

## 2021-09-28 PROCEDURE — 85027 COMPLETE CBC AUTOMATED: CPT

## 2021-09-28 PROCEDURE — 97162 PT EVAL MOD COMPLEX 30 MIN: CPT

## 2021-09-28 RX ORDER — DIVALPROEX SODIUM 500 MG/1
2 TABLET, DELAYED RELEASE ORAL
Qty: 0 | Refills: 0 | DISCHARGE
Start: 2021-09-28

## 2021-09-28 RX ORDER — DIVALPROEX SODIUM 500 MG/1
750 TABLET, DELAYED RELEASE ORAL
Qty: 0 | Refills: 0 | DISCHARGE

## 2021-09-28 RX ORDER — DIVALPROEX SODIUM 500 MG/1
3 TABLET, DELAYED RELEASE ORAL
Qty: 0 | Refills: 0 | DISCHARGE
Start: 2021-09-28

## 2021-09-28 RX ADMIN — Medication 81 MILLIGRAM(S): at 11:28

## 2021-09-28 RX ADMIN — FINASTERIDE 5 MILLIGRAM(S): 5 TABLET, FILM COATED ORAL at 11:28

## 2021-09-28 RX ADMIN — DIVALPROEX SODIUM 750 MILLIGRAM(S): 500 TABLET, DELAYED RELEASE ORAL at 05:23

## 2021-09-28 RX ADMIN — ENOXAPARIN SODIUM 40 MILLIGRAM(S): 100 INJECTION SUBCUTANEOUS at 11:30

## 2021-09-28 RX ADMIN — LEVETIRACETAM 1500 MILLIGRAM(S): 250 TABLET, FILM COATED ORAL at 05:21

## 2021-09-28 RX ADMIN — NEBIVOLOL HYDROCHLORIDE 10 MILLIGRAM(S): 5 TABLET ORAL at 05:23

## 2021-09-28 RX ADMIN — ESCITALOPRAM OXALATE 10 MILLIGRAM(S): 10 TABLET, FILM COATED ORAL at 11:28

## 2021-09-28 NOTE — PROGRESS NOTE ADULT - ASSESSMENT
71M w/ PMHx of meningioma resection in 2003 and seizure disorder on keppra and depakote BIBEMS and wife for weakness (could not take off his shirt) and confusion (did not know name or birthday) around 10:30 AM today. He is on 1500 mg depakote (500 mg at 10 AM and 1000 mg at 10 PM)  and 1500 mg keppra BID at home. Loaded with 1000 mg VPA in ED. CTH w/ L temporal encephalomalacia, moderate atrophy and mild white matter disease. vEEG w/ no seizures, L frontotemporal discharges    Impression: weakness, confusion, right leg shaking likely 2/2 breakthrough seizure with postictal state    Plan:   -Video EEG  -Valproic acid 750 mg BID  -keppra 1500 mg BID  -Administer  1 mg IV Ativan PRN STAT for seizure activity or GTC > 3 minutes or significant derangement of vital signs.  -Administer 200 mg IV vimpat over 15 minutes for seizures refractory to ativan.  -Advise pt not to drive, operate heavy machinery, avoid heights, pools, bathtubs, locked doors.  71M w/ PMHx of meningioma resection in 2003 and seizure disorder on keppra and depakote BIBEMS and wife for weakness (could not take off his shirt) and confusion (did not know name or birthday) around 10:30 AM today. He is on 1500 mg depakote (500 mg at 10 AM and 1000 mg at 10 PM)  and 1500 mg keppra BID at home. Loaded with 1000 mg VPA in ED. CTH w/ L temporal encephalomalacia, moderate atrophy and mild white matter disease. vEEG w/ no seizures, L frontotemporal discharges    Impression: weakness, confusion, right leg shaking likely 2/2 breakthrough seizure with postictal state    Plan:   -Video EEG  -Valproic acid 750mg BID  -keppra 1500 mg BID  -Administer  1 mg IV Ativan PRN STAT for seizure activity or GTC > 3 minutes or significant derangement of vital signs.  -Administer 200 mg IV vimpat over 15 minutes for seizures refractory to ativan.  -Advise pt not to drive, operate heavy machinery, avoid heights, pools, bathtubs, locked doors.

## 2021-09-28 NOTE — OCCUPATIONAL THERAPY INITIAL EVALUATION ADULT - PERTINENT HX OF CURRENT PROBLEM, REHAB EVAL
71M p/w weakness (could not take off his shirt) and confusion (did not know name or birthday) around 10:30 AM today. 5mg IM versed given by EMS en route. Upon ED arrival to ED at 12 PM, pt was shaking his right leg for 10 minutes. Pt was looking to the left more than his right and with eyes closed, lethargic, not verbally responsive initially. Later in the day he seems a bit more alert but still lethargic and occasional 1 word replies.

## 2021-09-28 NOTE — OCCUPATIONAL THERAPY INITIAL EVALUATION ADULT - ADDITIONAL COMMENTS
Ct Head Mild periventricular white matter ischemia. Focal encephalomalacia and gliosis in the LEFT temporal lobe with overlying craniectomy and craniotomy. Moderate atrophy.

## 2021-09-28 NOTE — EEG REPORT - NS EEG TEXT BOX
EPILEPSY MONITORING UNIT REPORT   [facility]: [address], [place] [address], Ph#: [phone] [facility]: [id number] [place] [address], [place] [address], Ph#: [phone] Office: [address] [place] [address] Ph#: [phone]  Patient Name: Chele Aviles   Age: 71 years, : 1950 MRN #: [id number], Bess: [facility] 46 D  Referring Physician: ER admit   Study Information:  EEG Recording Technique: The patient underwent continuous Video-EEG monitoring, using Telemetry System hardware on the XLTek Digital System. EEG and video data were stored on a computer hard drive with important events saved in digital archive files. The material was reviewed by a physician (electroencephalographer / epileptologist) on a daily basis. Alejandro and seizure detection algorithms were utilized and reviewed. An EEG Technician attended to the patient, and was available throughout daytime work hours.  The epilepsy center neurologist was available in person or on call 24-hours per day.  EEG Placement and Labeling of Electrodes: The EEG was performed utilizing 20 channel referential EEG connections (coronal over temporal over parasagittal montage) using all standard 10-20 electrode placements with EKG, with additional electrodes placed in the inferior temporal region using the modified 10-10 montage electrode placements for elective admissions, or if deemed necessary. Recording was at a sampling rate of 256 samples per second per channel. Time synchronized digital video recording was done simultaneously with EEG recording. A low light infrared camera was used for low light recording.   Interpretation:  Day 4 – 	Start: 2021  08:00  	End: 2021  15:25 	Duration: 07 hr  25 min  Daily EEG Visual Analysis  FINDINGS:  The background was continuous, spontaneously variable and reactive. During wakefulness, the posterior dominant rhythm consisted of symmetric, well-modulated 9 Hz activity, with amplitude to 30 uV, that attenuated to eye opening.  Low amplitude frontal beta was noted in wakefulness.  Background Slowing: No generalized background slowing was present.  Focal Slowing:  Continuous polymorphic theta and delta slowing over the left frontotemporal region (Fp1/F7/T7).  Sleep Background: Drowsiness was characterized by fragmentation, attenuation, and slowing of the background activity.   Sleep was characterized by the presence of vertex waves, symmetric sleep spindles and K-complexes.  Other Non-Epileptiform Findings: Breach effect, left anterior temporal region characterized by higher amplitude, sharply contoured waves and fast activities.  Activation Procedures:  Hyperventilation was not performed.   Photic stimulation was not performed.  Interictal Epileptiform Activity:  Occasional sharp-wave discharges over the left frontotemporal region (F7/T7).  Events: No events or seizures recorded.  Artifacts: Intermittent myogenic and movement artifacts were noted.  ECG: The heart rate on single channel ECG was predominantly between 60-80 BPM.  AEDs:   Valproic acid 500 IV TID Levetiracetam 1500 BID  EEG Summary:  Abnormal EEG in the awake, drowsy and asleep states.  Occasional sharp-wave discharges over the left frontotemporal region (F7/T7). Continuous polymorphic theta and delta slowing over the left frontotemporal region (Fp1/F7/T7). Breach effect, left anterior temporal region  Impression/Clinical Correlate:  This is an abnormal EEG record.   This indicates a risk of focal-onset seizures from, as well as presence of structural abnormality and skull defect in the left frontotemporal region. No seizures were seen.    Panchito Cardoza MD, LACHELLE Fellow, [facility]   ------------------------------------ EEG Reading Room: [phone] On Call Service After Hours: [phone]

## 2021-09-28 NOTE — PROGRESS NOTE ADULT - SUBJECTIVE AND OBJECTIVE BOX
SUBJECTIVE: Pt seen and examined at bedside. He denies any acute overnight events.     INTERVAL HISTORY:    PAST MEDICAL & SURGICAL HISTORY:  Cavernoma  S/p resection 2004    Essential hypertension    Other hyperlipidemia    Partial symptomatic epilepsy with complex partial seizures, not intractable, without status epilepticus  Since 2004 s/p L. temporal cavernoma resection    Status post craniectomy  2004      FAMILY HISTORY:    SOCIAL HISTORY:   T/E/D:   Occupation:   Lives with:     MEDICATIONS (HOME):  Home Medications:  Depakote: 750 milligram(s) orally 2 times a day (25 Sep 2021 21:06)  escitalopram 10 mg oral tablet: 1 tab(s) orally once a day (31 Oct 2020 11:16)  finasteride 5 mg oral tablet: 1 tab(s) orally once a day (31 Oct 2020 11:16)  levETIRAcetam 750 mg oral tablet: 2 tab(s) orally 2 times a day (06 Mar 2021 11:13)  simvastatin 10 mg oral tablet: 1 tab(s) orally once a day (at bedtime) (31 Oct 2020 11:16)    MEDICATIONS  (STANDING):  aspirin  chewable 81 milliGRAM(s) Oral daily  diVALproex  milliGRAM(s) Oral two times a day  enoxaparin Injectable 40 milliGRAM(s) SubCutaneous daily  escitalopram 10 milliGRAM(s) Oral daily  finasteride 5 milliGRAM(s) Oral daily  influenza   Vaccine 0.5 milliLiter(s) IntraMuscular once  levETIRAcetam 1500 milliGRAM(s) Oral two times a day  nebivolol 10 milliGRAM(s) Oral daily  simvastatin 10 milliGRAM(s) Oral at bedtime    MEDICATIONS  (PRN):  lacosamide Injectable 200 milliGRAM(s) IV Push once PRN seizure activity  midazolam Injectable 5 milliGRAM(s) IntraMuscular once PRN convulsion lasting >3 min    ALLERGIES/INTOLERANCES:  Allergies  No Known Allergies    Intolerances    VITALS & EXAMINATION:  Vital Signs Last 24 Hrs  T(C): 36.8 (28 Sep 2021 07:08), Max: 37.2 (27 Sep 2021 15:48)  T(F): 98.2 (28 Sep 2021 07:08), Max: 99 (27 Sep 2021 15:48)  HR: 76 (28 Sep 2021 07:08) (60 - 76)  BP: 165/100 (28 Sep 2021 07:08) (147/80 - 187/87)  BP(mean): --  RR: 18 (28 Sep 2021 07:08) (18 - 20)  SpO2: 100% (28 Sep 2021 07:08) (94% - 100%)    General:  Constitutional: Obese Male, appears stated age, in no apparent distress including pain  Head: Normocephalic & atraumatic.  Respiratory: No increased work of breathing  Extremities: No cyanosis, clubbing, or edema.    Neurological (>12):  MS: Awake, alert. Normal affect. Follows all commands.    Language: Speech is clear, fluent with impaired repetition & comprehension (cannot name eyebrow, can name thumb)    CNs: EOMI no nystagmus, no diplopia. No facial asymmetry b/l    Motor: Normal muscle bulk & tone. No noticeable tremor or seizure. No pronator drift.              Deltoid	Biceps	Triceps	Wrist	Finger ABd	   R	5	5	5	5			5 	  L	5	5	5	5			5    	H-Flex	K-Flex	K-Ext	D-Flex	P-Flex  R	5	5	5	5	5	 	   L	5	5	5	5	5		    Sensation: Intact to LT b/l throughout.     Coordination: deferred    Gait: deferred    LABORATORY:      STUDIES & IMAGING:  Studies (EKG, EEG, EMG, etc):     Radiology (XR, CT, MR, U/S, TTE/SAHIL):    EEG Summary:    Abnormal EEG in the awake, drowsy and asleep states.    Occasional sharp-wave discharges over the left frontotemporal region (F7/T7).  Continuous polymorphic theta and delta slowing over the left frontotemporal region (Fp1/F7/T7).  Breach effect, left anterior temporal region    Impression/Clinical Correlate:    This is an abnormal EEG record.     This indicates a risk of focal-onset seizures from, as well as presence of structural abnormality and skull defect in the left frontotemporal region. No seizures were seen.

## 2021-09-28 NOTE — EEG REPORT - NS EEG TEXT BOX
EPILEPSY MONITORING UNIT REPORT   [facility]: [address], [place] [address], Ph#: [phone] [facility]: [address], [place] [address], Ph#: [phone] Office: [address] [place] [id number] Ph#: [phone]  Patient Name: Chele Aviles   Age: 71 years, : 1950 MRN #: [id number], Bess: [facility] [address] D  Referring Physician: ER admit   Study Information:  EEG Recording Technique: The patient underwent continuous Video-EEG monitoring, using Telemetry System hardware on the XLTek Digital System. EEG and video data were stored on a computer hard drive with important events saved in digital archive files. The material was reviewed by a physician (electroencephalographer / epileptologist) on a daily basis. Alejandro and seizure detection algorithms were utilized and reviewed. An EEG Technician attended to the patient, and was available throughout daytime work hours.  The epilepsy center neurologist was available in person or on call 24-hours per day.  EEG Placement and Labeling of Electrodes: The EEG was performed utilizing 20 channel referential EEG connections (coronal over temporal over parasagittal montage) using all standard 10-20 electrode placements with EKG, with additional electrodes placed in the inferior temporal region using the modified 10-10 montage electrode placements for elective admissions, or if deemed necessary. Recording was at a sampling rate of 256 samples per second per channel. Time synchronized digital video recording was done simultaneously with EEG recording. A low light infrared camera was used for low light recording.   Interpretation:  Day 3 – 	Start: 2021  08:00  	End: 2021  08:00 	Duration: 24 hr  00 min  Daily EEG Visual Analysis  FINDINGS:  The background was continuous, spontaneously variable and reactive. During wakefulness, the posterior dominant rhythm consisted of symmetric, well-modulated 9 Hz activity, with amplitude to 30 uV, that attenuated to eye opening.  Low amplitude frontal beta was noted in wakefulness.  Background Slowing: No generalized background slowing was present.  Focal Slowing:  Continuous polymorphic theta and delta slowing over the left frontotemporal region (Fp1/F7/T7).  Sleep Background: Drowsiness was characterized by fragmentation, attenuation, and slowing of the background activity.   Sleep was characterized by the presence of vertex waves, symmetric sleep spindles and K-complexes.  Other Non-Epileptiform Findings: Breach effect, left anterior temporal region characterized by higher amplitude, sharply contoured waves and fast activities.  Activation Procedures:  Hyperventilation was not performed.   Photic stimulation was not performed.  Interictal Epileptiform Activity:  Occasional sharp-wave discharges over the left frontotemporal region (F7/T7).  Events: No events or seizures recorded.  Artifacts: Intermittent myogenic and movement artifacts were noted.  ECG: The heart rate on single channel ECG was predominantly between 60-80 BPM.  AEDs:   Valproic acid 500 IV TID Levetiracetam 1500 BID  EEG Summary:  Abnormal EEG in the awake, drowsy and asleep states.  Occasional sharp-wave discharges over the left frontotemporal region (F7/T7). Continuous polymorphic theta and delta slowing over the left frontotemporal region (Fp1/F7/T7). Breach effect, left anterior temporal region  Impression/Clinical Correlate:  This is an abnormal EEG record.   This indicates a risk of focal-onset seizures from, as well as presence of structural abnormality and skull defect in the left frontotemporal region. No seizures were seen.    Panchito Cardoza MD, LACHELLE Fellow, [facility]   ------------------------------------ EEG Reading Room: [phone] On Call Service After Hours: [phone]

## 2021-09-28 NOTE — PROGRESS NOTE ADULT - REASON FOR ADMISSION
weakness, confusion, right leg shaking - possibly postictal

## 2021-09-28 NOTE — DISCHARGE NOTE NURSING/CASE MANAGEMENT/SOCIAL WORK - PATIENT PORTAL LINK FT
You can access the FollowMyHealth Patient Portal offered by [facility] by registering at the following website: [email or web address]. By joining "[employer]’s FollowMyHealth portal, you will also be able to view your health information using other applications (apps) compatible with our system.

## 2021-09-28 NOTE — OCCUPATIONAL THERAPY INITIAL EVALUATION ADULT - LIVES WITH, PROFILE
Lives in private house with wife with 4 steps a landing and another 4 steps to enter +handrail,/spouse

## 2021-09-28 NOTE — PROGRESS NOTE ADULT - ATTENDING COMMENTS
mild improvement  transitioned to oral  BID  continue keppra 1500 bid
mild improvement  transitioned to oral  BID  continue keppra 1500 bid
stable

## 2021-09-29 ENCOUNTER — NON-APPOINTMENT (OUTPATIENT)
Age: 71
End: 2021-09-29

## 2021-09-30 LAB
LEVETIRACETAM SERPL-MCNC: 62.3 UG/ML — HIGH (ref 10–40)
LEVETIRACETAM SERPL-MCNC: 95.3 UG/ML — HIGH (ref 10–40)

## 2021-10-03 ENCOUNTER — TRANSCRIPTION ENCOUNTER (OUTPATIENT)
Age: 71
End: 2021-10-03

## 2021-10-05 ENCOUNTER — APPOINTMENT (OUTPATIENT)
Dept: NEUROLOGY | Facility: CLINIC | Age: 71
End: 2021-10-05
Payer: MEDICARE

## 2021-10-05 VITALS
HEIGHT: 74 IN | WEIGHT: 240 LBS | SYSTOLIC BLOOD PRESSURE: 160 MMHG | HEART RATE: 72 BPM | DIASTOLIC BLOOD PRESSURE: 79 MMHG | BODY MASS INDEX: 30.8 KG/M2

## 2021-10-05 PROCEDURE — 99214 OFFICE O/P EST MOD 30 MIN: CPT

## 2021-10-06 NOTE — ASSESSMENT
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 continue keppra to 1500 bid\par 2. Continue VPA ER  7154-7830\par 3.OFF Dilantin 100\par 4. OFF XCOPRI\par 6. clonazepam ODT 0.25 mg prn auras ( nausea/abdominal rising sensation)\par 7 continue lexapro 20 mg daily \par 8. f/u in December \par \par

## 2021-10-06 NOTE — HISTORY OF PRESENT ILLNESS
[FreeTextEntry1] : *** 10/05/2021 ***\par \par MAURO NUNEZ was recently admitted to EMU for a prolong seizure. His mood is depressed. \par \par \par *** 09/03/2021 ***\par \par MAURO NUNEZ is here for follow up.\par he did not have any seizures since last visit and he is more active.\par has indirect signs of depression.\par \par \par \par \par *** 04/30/2021 *** \par \par MAURO NUNEZ is here for follow up. he did not have any seizures\par continue to be mildly depressed. will travel to [place] in the summer\par compliant with medication\par \par *** 03/12/2021 ***\par \par MAURO NUNEZ is here for follow up.\par \par vpa 500-1000\par keppra 750 bid\par \par He was recently admitted in the hospital and was intubated for a probable focal status epilepticus.\par \par He was tapering xcopri due to mood SE(?).\par Now, he is only on keppra and vpa\par \par *** 01/27/2021 *** \par \par MAURO NUNEZ is seen for follow up. continue to be depressed but no seizures. Tolerated well Xcopri\par \par *** 12/02/2020 *** \par \par MAURO NUNEZ is here for follow up.\par no seizures since dc\par continue to have depressed mood( medication SE?)\par \par Xcopri was approved but did not started as of yet due to high copay for increased dose ( at )\par \par \par *** 11/10/2020 ***\par \par MAURO NUNEZ is here for hospital follow up and for transition of care for his epilepsy.\par he was recently admitted in the hospital and his hospital course is as follow:\par \par 70y R-handed M with h/o, HTN, HLD and L. 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 breakthrough\par seizure. Last seizure 2 years prior. Transferred from NSCU to EMU. Pt with\par great improvement.\par \par EEG Summary: 10/30/20\par Abnormal EEG in the awake, drowsy and asleep states.\par spikes, focal, left anterior temporal region\par continuous polymorphic delta, focal, left temporal region\par \par \par he had severe postictal psychosis and was extremely agitated for 2 days.\par He is stable now and he did not have any seizures, but he feels depressed.\par \par he had tried several AEDs in the past and developed SE or they were not covered by his insurance.\par \par he failed Keppra low dose, Depakote, Dilantin, zonisamide, Tegretol, Vimpat, Aptiom.\par \par his levels in the hospital were subtherapeutic likely due to drug-drug interaction\par \par \par \par \par

## 2021-10-29 ENCOUNTER — APPOINTMENT (OUTPATIENT)
Dept: NEUROLOGY | Facility: CLINIC | Age: 71
End: 2021-10-29

## 2021-11-02 RX ORDER — ESCITALOPRAM OXALATE 10 MG/1
10 TABLET ORAL DAILY
Qty: 180 | Refills: 1 | Status: DISCONTINUED | COMMUNITY
Start: 2021-09-20 | End: 2021-11-02

## 2021-11-02 RX ORDER — ESCITALOPRAM OXALATE 20 MG/1
20 TABLET ORAL DAILY
Qty: 30 | Refills: 5 | Status: ACTIVE | COMMUNITY
Start: 2021-11-02 | End: 1900-01-01

## 2021-11-19 ENCOUNTER — RX RENEWAL (OUTPATIENT)
Age: 71
End: 2021-11-19

## 2021-11-19 RX ORDER — LEVETIRACETAM 750 MG/1
750 TABLET, FILM COATED ORAL
Qty: 360 | Refills: 0 | Status: ACTIVE | COMMUNITY
Start: 2020-07-28 | End: 1900-01-01

## 2021-12-06 NOTE — ED ADULT NURSE REASSESSMENT NOTE - NS ED NURSE REASSESS COMMENT FT1
Cardiology Cardiology Sullivan inserted. 2 RNs at bedside maintaining sterile technique. 150 mLs of urine obtained. Urinalysis and urine cultures obtained.  Pt cleaned, sheets changed, keyshawn placed under patient. Appropriate side rails raised, wheels locked, bed in lowest position. Cardiology Cardiology Cardiology Internal Medicine Internal Medicine Internal Medicine Internal Medicine Internal Medicine Internal Medicine

## 2021-12-13 NOTE — STROKE CODE NOTE - ENDOVASCULAR THERAPY
Patients wife called said she was told to call Dr Zafar's office to speak to the doctor or nurse about her husbands seizure. Patients wife declined making a appointment. Patients wife said she would like a call back today. Patient advised her request would be sent in a message.    No

## 2021-12-17 ENCOUNTER — APPOINTMENT (OUTPATIENT)
Dept: NEUROLOGY | Facility: CLINIC | Age: 71
End: 2021-12-17
Payer: MEDICARE

## 2021-12-17 VITALS
HEART RATE: 59 BPM | SYSTOLIC BLOOD PRESSURE: 152 MMHG | HEIGHT: 74 IN | DIASTOLIC BLOOD PRESSURE: 82 MMHG | BODY MASS INDEX: 30.8 KG/M2 | WEIGHT: 240 LBS

## 2021-12-17 PROCEDURE — 99214 OFFICE O/P EST MOD 30 MIN: CPT

## 2021-12-17 RX ORDER — ESCITALOPRAM OXALATE 20 MG/1
20 TABLET ORAL DAILY
Qty: 30 | Refills: 3 | Status: ACTIVE | COMMUNITY
Start: 2021-12-17 | End: 1900-01-01

## 2021-12-19 NOTE — HISTORY OF PRESENT ILLNESS
[FreeTextEntry1] : *** 12/17/2021 ***\par \par MAURO NUNEZ is here for follow up. continue to be severely depressed w akinetic features. no seizures since last admission to EMU ( short period)\par he craves high amount of carbohydrates ( increased serotonin? need) no hallucinations\par likely MCI vs Alzheimer's dementia\par \par \par \par *** 10/05/2021 ***\par \par MAURO NUNEZ was recently admitted to EMU for a prolong seizure. His mood is depressed. \par \par \par *** 09/03/2021 ***\par \par MAURO NUNEZ is here for follow up.\par he did not have any seizures since last visit and he is more active.\par has indirect signs of depression.\par \par \par \par \par *** 04/30/2021 *** \par \par MAURO NUNEZ is here for follow up. he did not have any seizures\par continue to be mildly depressed. will travel to [place] in the summer\par compliant with medication\par \par *** 03/12/2021 ***\par \par MAURO NUNEZ is here for follow up.\par \par vpa 500-1000\par keppra 750 bid\par \par He was recently admitted in the hospital and was intubated for a probable focal status epilepticus.\par \par He was tapering xcopri due to mood SE(?).\par Now, he is only on keppra and vpa\par \par *** 01/27/2021 *** \par \par MAURO NUNEZ is seen for follow up. continue to be depressed but no seizures. Tolerated well Xcopri\par \par *** 12/02/2020 *** \par \par MAURO NUNEZ is here for follow up.\par no seizures since dc\par continue to have depressed mood( medication SE?)\par \par Xcopri was approved but did not started as of yet due to high copay for increased dose ( at )\par \par \par *** 11/10/2020 ***\par \par MAURO NUNEZ is here for hospital follow up and for transition of care for his epilepsy.\par he was recently admitted in the hospital and his hospital course is as follow:\par \par 70y R-handed M with h/o, HTN, HLD and L. 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 breakthrough\par seizure. Last seizure 2 years prior. Transferred from NSCU to EMU. Pt with\par great improvement.\par \par EEG Summary: 10/30/20\par Abnormal EEG in the awake, drowsy and asleep states.\par spikes, focal, left anterior temporal region\par continuous polymorphic delta, focal, left temporal region\par \par \par he had severe postictal psychosis and was extremely agitated for 2 days.\par He is stable now and he did not have any seizures, but he feels depressed.\par \par he had tried several AEDs in the past and developed SE or they were not covered by his insurance.\par \par he failed Keppra low dose, Depakote, Dilantin, zonisamide, Tegretol, Vimpat, Aptiom.\par \par his levels in the hospital were subtherapeutic likely due to drug-drug interaction\par \par \par \par \par

## 2021-12-19 NOTE — ASSESSMENT
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 continue keppra to 1500 bid\par 2. Continue VPA ER  5917-4396\par 3.OFF Dilantin 100\par 4. OFF XCOPRI\par 6. clonazepam ODT 0.25 mg prn auras ( nausea/abdominal rising sensation)\par 7 continue lexapro 20 mg daily \par 8. restart donepezil as prescribed by his outside neurologist\par 9. eval by our dementia specialist.\par \par

## 2021-12-21 ENCOUNTER — APPOINTMENT (OUTPATIENT)
Dept: INTERNAL MEDICINE | Facility: CLINIC | Age: 71
End: 2021-12-21
Payer: MEDICARE

## 2021-12-21 PROCEDURE — 36415 COLL VENOUS BLD VENIPUNCTURE: CPT

## 2021-12-22 ENCOUNTER — TRANSCRIPTION ENCOUNTER (OUTPATIENT)
Age: 71
End: 2021-12-22

## 2021-12-23 ENCOUNTER — TRANSCRIPTION ENCOUNTER (OUTPATIENT)
Age: 71
End: 2021-12-23

## 2021-12-23 LAB
ALBUMIN SERPL ELPH-MCNC: 4 G/DL
ALP BLD-CCNC: 63 U/L
ALT SERPL-CCNC: 13 U/L
ANION GAP SERPL CALC-SCNC: 14 MMOL/L
AST SERPL-CCNC: 24 U/L
BASOPHILS # BLD AUTO: 0.03 K/UL
BASOPHILS NFR BLD AUTO: 0.3 %
BILIRUB SERPL-MCNC: 0.9 MG/DL
BUN SERPL-MCNC: 13 MG/DL
CALCIUM SERPL-MCNC: 8.8 MG/DL
CHLORIDE SERPL-SCNC: 101 MMOL/L
CHOLEST SERPL-MCNC: 150 MG/DL
CO2 SERPL-SCNC: 26 MMOL/L
CREAT SERPL-MCNC: 1.02 MG/DL
EOSINOPHIL # BLD AUTO: 0.01 K/UL
EOSINOPHIL NFR BLD AUTO: 0.1 %
ESTIMATED AVERAGE GLUCOSE: 108 MG/DL
GLUCOSE SERPL-MCNC: 88 MG/DL
HBA1C MFR BLD HPLC: 5.4 %
HCT VFR BLD CALC: 41.3 %
HDLC SERPL-MCNC: 48 MG/DL
HGB BLD-MCNC: 14 G/DL
IMM GRANULOCYTES NFR BLD AUTO: 2 %
LDLC SERPL CALC-MCNC: 85 MG/DL
LDLC SERPL DIRECT ASSAY-MCNC: 70 MG/DL
LYMPHOCYTES # BLD AUTO: 4.21 K/UL
LYMPHOCYTES NFR BLD AUTO: 45.6 %
MAN DIFF?: NORMAL
MCHC RBC-ENTMCNC: 32.3 PG
MCHC RBC-ENTMCNC: 33.9 GM/DL
MCV RBC AUTO: 95.4 FL
MONOCYTES # BLD AUTO: 0.91 K/UL
MONOCYTES NFR BLD AUTO: 9.9 %
NEUTROPHILS # BLD AUTO: 3.89 K/UL
NEUTROPHILS NFR BLD AUTO: 42.1 %
NONHDLC SERPL-MCNC: 101 MG/DL
PLATELET # BLD AUTO: 130 K/UL
POTASSIUM SERPL-SCNC: 3.7 MMOL/L
PROT SERPL-MCNC: 6.4 G/DL
PSA SERPL-MCNC: 2.77 NG/ML
RBC # BLD: 4.33 M/UL
RBC # FLD: 16.2 %
SODIUM SERPL-SCNC: 141 MMOL/L
TRIGL SERPL-MCNC: 80 MG/DL
WBC # FLD AUTO: 9.23 K/UL

## 2021-12-23 RX ORDER — DONEPEZIL HYDROCHLORIDE 10 MG/1
10 TABLET ORAL
Qty: 90 | Refills: 1 | Status: ACTIVE | COMMUNITY
Start: 2021-12-17 | End: 1900-01-01

## 2021-12-28 ENCOUNTER — APPOINTMENT (OUTPATIENT)
Dept: INTERNAL MEDICINE | Facility: CLINIC | Age: 71
End: 2021-12-28
Payer: MEDICARE

## 2021-12-28 VITALS
SYSTOLIC BLOOD PRESSURE: 140 MMHG | BODY MASS INDEX: 30.8 KG/M2 | DIASTOLIC BLOOD PRESSURE: 70 MMHG | HEIGHT: 74 IN | WEIGHT: 240 LBS

## 2021-12-28 DIAGNOSIS — R60.9 EDEMA, UNSPECIFIED: ICD-10-CM

## 2021-12-28 DIAGNOSIS — Z86.03 OTHER SPECIFIED POSTPROCEDURAL STATES: ICD-10-CM

## 2021-12-28 DIAGNOSIS — E78.00 PURE HYPERCHOLESTEROLEMIA, UNSPECIFIED: ICD-10-CM

## 2021-12-28 DIAGNOSIS — D64.9 ANEMIA, UNSPECIFIED: ICD-10-CM

## 2021-12-28 DIAGNOSIS — I63.9 CEREBRAL INFARCTION, UNSPECIFIED: ICD-10-CM

## 2021-12-28 DIAGNOSIS — R97.20 ELEVATED PROSTATE, SPECIFIC ANTIGEN [PSA]: ICD-10-CM

## 2021-12-28 DIAGNOSIS — R73.01 IMPAIRED FASTING GLUCOSE: ICD-10-CM

## 2021-12-28 DIAGNOSIS — Z98.890 OTHER SPECIFIED POSTPROCEDURAL STATES: ICD-10-CM

## 2021-12-28 PROCEDURE — 99214 OFFICE O/P EST MOD 30 MIN: CPT

## 2021-12-28 NOTE — PHYSICAL EXAM
[No Acute Distress] : no acute distress [Well Developed] : well developed [No Respiratory Distress] : no respiratory distress  [No Accessory Muscle Use] : no accessory muscle use [Clear to Auscultation] : lungs were clear to auscultation bilaterally [Normal Rate] : normal rate  [Regular Rhythm] : with a regular rhythm [Soft] : abdomen soft [de-identified] : +1 seb. LE edema

## 2021-12-28 NOTE — HISTORY OF PRESENT ILLNESS
[FreeTextEntry1] : History of CVA, seizure disorder, worsening memory, hypertension, hyperlipidemia, anemia, high glucose, and elevated PSA [de-identified] : Per wife, having more issues with independent activities of daily living.  Memory seems to be worse.

## 2022-01-05 ENCOUNTER — APPOINTMENT (OUTPATIENT)
Dept: GERIATRICS | Facility: CLINIC | Age: 72
End: 2022-01-05
Payer: MEDICARE

## 2022-01-05 ENCOUNTER — NON-APPOINTMENT (OUTPATIENT)
Age: 72
End: 2022-01-05

## 2022-01-05 VITALS
RESPIRATION RATE: 15 BRPM | OXYGEN SATURATION: 97 % | TEMPERATURE: 97.3 F | HEART RATE: 52 BPM | SYSTOLIC BLOOD PRESSURE: 132 MMHG | HEIGHT: 74 IN | DIASTOLIC BLOOD PRESSURE: 68 MMHG | WEIGHT: 223.2 LBS | BODY MASS INDEX: 28.64 KG/M2

## 2022-01-05 DIAGNOSIS — G31.84 MILD COGNITIVE IMPAIRMENT, SO STATED: ICD-10-CM

## 2022-01-05 DIAGNOSIS — R26.81 UNSTEADINESS ON FEET: ICD-10-CM

## 2022-01-05 PROCEDURE — 99205 OFFICE O/P NEW HI 60 MIN: CPT

## 2022-01-05 NOTE — HISTORY OF PRESENT ILLNESS
[Full assistance needed] : Assistance needed managing medications [] : Assistance needed managing finances. [Wheelchair] : wheelchair [FreeTextEntry1] : 71M with sz disorder, hypercholesterolemia, HTN presents to clinic for initial evaluation accompanied by wife Jeannie for depression. \par \par Jeannie notes that pt has been increasingly withdrawn lately, chooses to spend most of his time in bed with little interest in doing any activities. Chele states he feels "lazy" and has no desire to do things during the day. He states his mood is depressed, Jeannie notes that his memory has also been impaired lately and that he has never fully come back to his baseline after his most recent seizure 2 months ago. Is seeing Dr. Hoover for seizure disorder mgmt, is pending visit with Dr. Mann for memory. \par \par Denies suicidal ideations. Feels satisfied and fortunate to have wife and sister whom watch over him. Has 2 children that live out of state. \par \par  [No falls in past year] : Patient reported no falls in the past year [1] : 2) Feeling down, depressed, or hopeless for several days (1) [GDS] : 7 [Patient/Caregiver not ready to engage] : , patient/caregiver not ready to engage

## 2022-01-05 NOTE — PHYSICAL EXAM
[Alert] : alert [No Acute Distress] : in no acute distress [Normal Appearance] : the appearance of the neck was normal [No Respiratory Distress] : no respiratory distress [Heart Rate And Rhythm] : heart rate was normal and rhythm regular [Normal Outer Ear/Nose] : the ears and nose were normal in appearance [Normal S1, S2] : normal S1 and S2 [Edema] : edema was not present [Bowel Sounds] : normal bowel sounds [No Spinal Tenderness] : no spinal tenderness [Involuntary Movements] : no involuntary movements were seen [Normal Color / Pigmentation] : normal skin color and pigmentation [No Focal Deficits] : no focal deficits [Normal Insight/Judgment] : insight and judgment were intact [de-identified] : flar affect, conversant, [employer] [place]

## 2022-01-20 ENCOUNTER — OUTPATIENT (OUTPATIENT)
Dept: OUTPATIENT SERVICES | Facility: HOSPITAL | Age: 72
LOS: 1 days | Discharge: HOME | End: 2022-01-20
Payer: MEDICARE

## 2022-01-20 DIAGNOSIS — Z98.890 OTHER SPECIFIED POSTPROCEDURAL STATES: Chronic | ICD-10-CM

## 2022-01-20 PROCEDURE — 90792 PSYCH DIAG EVAL W/MED SRVCS: CPT | Mod: 95

## 2022-01-27 ENCOUNTER — NON-APPOINTMENT (OUTPATIENT)
Age: 72
End: 2022-01-27

## 2022-02-01 ENCOUNTER — TRANSCRIPTION ENCOUNTER (OUTPATIENT)
Age: 72
End: 2022-02-01

## 2022-02-01 ENCOUNTER — NON-APPOINTMENT (OUTPATIENT)
Age: 72
End: 2022-02-01

## 2022-02-17 DIAGNOSIS — F32.1 MAJOR DEPRESSIVE DISORDER, SINGLE EPISODE, MODERATE: ICD-10-CM

## 2022-02-17 PROCEDURE — 99214 OFFICE O/P EST MOD 30 MIN: CPT | Mod: 95

## 2022-02-20 NOTE — PATIENT PROFILE ADULT - LEGAL HELP
Problem: Falls - Risk of:  Goal: Will remain free from falls  Description: Will remain free from falls  Outcome: Ongoing  Goal: Absence of physical injury  Description: Absence of physical injury  Outcome: Ongoing     Problem: Skin Integrity:  Goal: Will show no infection signs and symptoms  Description: Will show no infection signs and symptoms  Outcome: Ongoing  Goal: Absence of new skin breakdown  Description: Absence of new skin breakdown  Outcome: Ongoing     Problem: Confusion - Acute:  Goal: Absence of continued neurological deterioration signs and symptoms  Description: Absence of continued neurological deterioration signs and symptoms  Outcome: Ongoing  Goal: Mental status will be restored to baseline  Description: Mental status will be restored to baseline  Outcome: Ongoing     Problem: Discharge Planning:  Goal: Ability to perform activities of daily living will improve  Description: Ability to perform activities of daily living will improve  Outcome: Ongoing  Goal: Participates in care planning  Description: Participates in care planning  Outcome: Ongoing     Problem: Injury - Risk of, Physical Injury:  Goal: Will remain free from falls  Description: Will remain free from falls  Outcome: Ongoing  Goal: Absence of physical injury  Description: Absence of physical injury  Outcome: Ongoing     Problem: Mood - Altered:  Goal: Mood stable  Description: Mood stable  Outcome: Ongoing  Goal: Absence of abusive behavior  Description: Absence of abusive behavior  Outcome: Ongoing  Goal: Verbalizations of feeling emotionally comfortable while being cared for will increase  Description: Verbalizations of feeling emotionally comfortable while being cared for will increase  Outcome: Ongoing     Problem: Psychomotor Activity - Altered:  Goal: Absence of psychomotor disturbance signs and symptoms  Description: Absence of psychomotor disturbance signs and symptoms  Outcome: Ongoing     Problem: Sensory Perception - Impaired:  Goal: Demonstrations of improved sensory functioning will increase  Description: Demonstrations of improved sensory functioning will increase  Outcome: Ongoing  Goal: Decrease in sensory misperception frequency  Description: Decrease in sensory misperception frequency  Outcome: Ongoing  Goal: Able to refrain from responding to false sensory perceptions  Description: Able to refrain from responding to false sensory perceptions  Outcome: Ongoing  Goal: Demonstrates accurate environmental perceptions  Description: Demonstrates accurate environmental perceptions  Outcome: Ongoing  Goal: Able to distinguish between reality-based and nonreality-based thinking  Description: Able to distinguish between reality-based and nonreality-based thinking  Outcome: Ongoing  Goal: Able to interrupt nonreality-based thinking  Description: Able to interrupt nonreality-based thinking  Outcome: Ongoing     Problem: Sleep Pattern Disturbance:  Goal: Appears well-rested  Description: Appears well-rested  Outcome: Ongoing     Problem: Nutrition  Goal: Optimal nutrition therapy  Outcome: Ongoing  Goal: Understanding of nutritional guidelines  Outcome: Ongoing     Problem: Coping:  Goal: Ability to cope will improve  Description: Ability to cope will improve  Outcome: Ongoing   Pt resting in bed, on RA and denies SOB and pain. TF infusing, wound vac patent, rea and FMS patent, all drsg c/d/i. Reviewed with pt plan of care for shift and medications, pt voices no concerns. no

## 2022-02-24 DIAGNOSIS — F09 UNSPECIFIED MENTAL DISORDER DUE TO KNOWN PHYSIOLOGICAL CONDITION: ICD-10-CM

## 2022-03-09 ENCOUNTER — APPOINTMENT (OUTPATIENT)
Dept: GERIATRICS | Facility: CLINIC | Age: 72
End: 2022-03-09
Payer: MEDICARE

## 2022-03-09 DIAGNOSIS — F41.9 ANXIETY DISORDER, UNSPECIFIED: ICD-10-CM

## 2022-03-09 DIAGNOSIS — F32.A DEPRESSION, UNSPECIFIED: ICD-10-CM

## 2022-03-09 DIAGNOSIS — Z86.69 PERSONAL HISTORY OF OTHER DISEASES OF THE NERVOUS SYSTEM AND SENSE ORGANS: ICD-10-CM

## 2022-03-09 PROCEDURE — 99214 OFFICE O/P EST MOD 30 MIN: CPT | Mod: 95

## 2022-03-09 PROCEDURE — 99497 ADVNCD CARE PLAN 30 MIN: CPT | Mod: 25,95

## 2022-03-09 RX ORDER — ATENOLOL 25 MG/1
25 TABLET ORAL DAILY
Qty: 90 | Refills: 3 | Status: DISCONTINUED | COMMUNITY
Start: 2022-02-01 | End: 2022-03-09

## 2022-03-09 RX ORDER — LISINOPRIL 5 MG/1
5 TABLET ORAL
Qty: 30 | Refills: 1 | Status: ACTIVE | COMMUNITY
Start: 2022-03-09 | End: 1900-01-01

## 2022-03-09 NOTE — PHYSICAL EXAM
[Well Developed] : well developed [No Respiratory Distress] : no respiratory distress [Involuntary Movements] : no involuntary movements were seen [Normal Affect] : the affect was normal [Normal Mood] : the mood was normal [de-identified] : dosing off, arousable to command and involved when asked question  [MentalStatusTotal] : unable to obtain

## 2022-03-09 NOTE — REASON FOR VISIT
[Medical Office: ([facility])___] : at the medical office located in  [Home] : at home, [unfilled] , at the time of the visit. [Spouse] : spouse [Verbal consent obtained from patient] : the patient, [unfilled] [Follow-Up] : a follow-up visit [FreeTextEntry4] : Jeannie

## 2022-03-09 NOTE — HISTORY OF PRESENT ILLNESS
[One fall no injury in past year] : Patient reported one fall in the past year without injury [Full assistance needed] : Assistance needed managing medications [] : Assistance needed managing finances. [FAST Score: ____] : Functional Assessment Scale (FAST) Score: [unfilled] [Wheelchair] : wheelchair [Grab Bars] : grab bars [Wears Seat Belt] : wears seat belt [PHQ-2 Positive] : PHQ-2 Positive [With Patient/Caregiver] : , with patient/caregiver [FreeTextEntry1] : 71M with seizure disorder, hypercholesterolemia, HTN presents to clinic for f/u of dementia and depression.\par \par Patient unable to provide through history. Wife Jeannie assisted with history. ZAKIYA Tafoya present for aide in discussion of goals of care. \par \par SoHx: two children live out of state in [place]. Patients sister lives next door. \par \par  [GDS] : 7 (done 1/2022) [AdvancecareDate] : 03/09/2022 [FreeTextEntry4] : Please see in A/P

## 2022-03-09 NOTE — REVIEW OF SYSTEMS
[Feeling Poorly] : feeling poorly [Heart Rate Is Slow] : slow heart rate [Incontinence] : incontinence [As Noted in HPI] : as noted in HPI [Depression] : depression [Negative] : Heme/Lymph

## 2022-03-15 ENCOUNTER — NON-APPOINTMENT (OUTPATIENT)
Age: 72
End: 2022-03-15

## 2022-03-16 ENCOUNTER — APPOINTMENT (OUTPATIENT)
Dept: GERIATRICS | Facility: CLINIC | Age: 72
End: 2022-03-16
Payer: MEDICARE

## 2022-03-16 DIAGNOSIS — I10 ESSENTIAL (PRIMARY) HYPERTENSION: ICD-10-CM

## 2022-03-16 DIAGNOSIS — R00.1 BRADYCARDIA, UNSPECIFIED: ICD-10-CM

## 2022-03-16 PROCEDURE — 99497 ADVNCD CARE PLAN 30 MIN: CPT | Mod: 25,95

## 2022-03-16 PROCEDURE — 99214 OFFICE O/P EST MOD 30 MIN: CPT | Mod: 95

## 2022-03-16 NOTE — HISTORY OF PRESENT ILLNESS
[FreeTextEntry1] : 71M with seizure disorder, hypercholesterolemia, HTN presents to clinic for f/u for HTN and bradycardia. \par \par Patient takes medications at 10 am. \par BP readings vary from 10am-12pm \par 3/10: 151/92, HR 47\par 3/11: 166/82, 53\par 3/12: 136/73, 50\par 3/13: 149/80, 50\par 3/14: 145/73, 46\par 3/15: 153/80, 61\par 3/16: 153/104, 58 \par \par Denies syncope, ha, n/v, cp. \par Has loss of appetite and now 2 person assist. [Patient declined hearing screen] : Patient declined hearing screen [Patient declined vision screening] : Patient declined vision screening [Patient denied dental screening.] : Patient denied dental screening [Patient declined colon rectal/cancer screening] : Patient declined colon/rectal cancer screening [Patient declined skin cancer screening] : Patient declined skin cancer screening [Patient declined breast sonogram] : Patient declined breast sonogram [Patient declined cervical cancer screening] : Patient declined cervical cancer screening [No falls in past year] : Patient reported no falls in the past year [Completely Dependent] : Completely dependent. [Full assistance needed] : Assistance needed managing medications [] : Assistance needed managing finances. [FAST Score: ____] : Functional Assessment Scale (FAST) Score: [unfilled] [Grab Bars] : grab bars [Wears Seat Belt] : wears seat belt [de-identified] : bedbound [PHQ-2 Positive] : PHQ-2 Positive [GDS] : 7 (done 1/2022) [Severe] : Stage: Severe [Worse] : Status: Worse [With Patient/Caregiver] : , with patient/caregiver [FreeTextEntry4] : Please see in A/P [AdvancecareDate] : 03/09/2022

## 2022-03-16 NOTE — PHYSICAL EXAM
[Well Developed] : well developed [No Respiratory Distress] : no respiratory distress [Involuntary Movements] : no involuntary movements were seen [Normal Affect] : the affect was normal [Normal Mood] : the mood was normal [de-identified] : dosing off, arousable to command and involved when asked question  [MentalStatusTotal] : unable to obtain

## 2022-03-16 NOTE — REASON FOR VISIT
[Follow-Up] : a follow-up visit [Family Member] : family member [FreeTextEntry3] : wife Jeannie [Home] : at home, [unfilled] , at the time of the visit. [Medical Office: ([facility])___] : at the medical office located in  [Spouse] : spouse [Verbal consent obtained from patient] : the patient, [unfilled] [FreeTextEntry4] : Jeannie

## 2022-03-18 ENCOUNTER — APPOINTMENT (OUTPATIENT)
Dept: NEUROLOGY | Facility: CLINIC | Age: 72
End: 2022-03-18
Payer: MEDICARE

## 2022-03-18 DIAGNOSIS — G40.219 LOCALIZATION-RELATED (FOCAL) (PARTIAL) SYMPTOMATIC EPILEPSY AND EPILEPTIC SYNDROMES WITH COMPLEX PARTIAL SEIZURES, INTRACTABLE, W/OUT STATUS EPILEPTICUS: ICD-10-CM

## 2022-03-18 PROCEDURE — 99214 OFFICE O/P EST MOD 30 MIN: CPT | Mod: 95

## 2022-03-21 NOTE — ASSESSMENT
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 continue keppra to 1500 bid\par 2. Continue VPA ER  6387-5759\par 3.OFF Dilantin 100\par 4. OFF XCOPRI\par 6. clonazepam ODT 0.25 mg prn auras ( nausea/abdominal rising sensation)\par 7 continue lexapro 20 mg daily \par 8. restart donepezil as prescribed by his outside neurologist\par 9. probable candidate for hospice\par \par

## 2022-03-21 NOTE — HISTORY OF PRESENT ILLNESS
[FreeTextEntry1] : *** 03/18/2022 ***\par \par Appointment was conducted by video conference in place of cancelled appointment due to heighten concern over coronavirus infection.\par Verbal consent was given on *** 03/18/2022 ***  by the patient, who understand that tele-visits will be charged to insurance and may involve co-pay for patient\par Physician location home\par Patient location home\par Patient on call: Dr. Hoover , spouse, patient\par \par MAURO NUNEZ is seen for follow up. he was recently diagnosed with dementia and given option for hospice. the assessment may be accurate and his seizure frequency may be secondary to this process\par \par \par \par \par *** 12/17/2021 ***\par \par MAURO UNNEZ is here for follow up. continue to be severely depressed w akinetic features. no seizures since last admission to EMU ( short period)\par he craves high amount of carbohydrates ( increased serotonin? need) no hallucinations\par likely MCI vs Alzheimer's dementia\par \par \par \par *** 10/05/2021 ***\par \par MAURO NUNEZ was recently admitted to EMU for a prolong seizure. His mood is depressed. \par \par \par *** 09/03/2021 ***\par \par MAURO NUNEZ is here for follow up.\par he did not have any seizures since last visit and he is more active.\par has indirect signs of depression.\par \par \par \par \par *** 04/30/2021 *** \par \par MAURO NUNEZ is here for follow up. he did not have any seizures\par continue to be mildly depressed. will travel to [place] in the summer\par compliant with medication\par \par *** 03/12/2021 ***\par \par MAURO NUNEZ is here for follow up.\par \par vpa 500-1000\par keppra 750 bid\par \par He was recently admitted in the hospital and was intubated for a probable focal status epilepticus.\par \par He was tapering xcopri due to mood SE(?).\par Now, he is only on keppra and vpa\par \par *** 01/27/2021 *** \par \par MAURO NUNEZ is seen for follow up. continue to be depressed but no seizures. Tolerated well Xcopri\par \par *** 12/02/2020 *** \par \par MAURO NUNEZ is here for follow up.\par no seizures since dc\par continue to have depressed mood( medication SE?)\par \par Xcopri was approved but did not started as of yet due to high copay for increased dose ( at )\par \par \par *** 11/10/2020 ***\par \par MAURO NUNEZ is here for hospital follow up and for transition of care for his epilepsy.\par he was recently admitted in the hospital and his hospital course is as follow:\par \par 70y R-handed M with h/o, HTN, HLD and L. 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 breakthrough\par seizure. Last seizure 2 years prior. Transferred from [facility]SC[facility] to EMU. Pt with\par great improvement.\par \par EEG Summary: 10/30/20\par Abnormal EEG in the awake, drowsy and asleep states.\par spikes, focal, left anterior temporal region\par continuous polymorphic delta, focal, left temporal region\par \par \par he had severe postictal psychosis and was extremely agitated for 2 days.\par He is stable now and he did not have any seizures, but he feels depressed.\par \par he had tried several AEDs in the past and developed SE or they were not covered by his insurance.\par \par he failed Keppra low dose, Depakote, Dilantin, zonisamide, Tegretol, Vimpat, Aptiom.\par \par his levels in the hospital were subtherapeutic likely due to drug-drug interaction\par \par \par \par \par

## 2022-03-25 ENCOUNTER — APPOINTMENT (OUTPATIENT)
Dept: GERIATRICS | Facility: CLINIC | Age: 72
End: 2022-03-25
Payer: MEDICARE

## 2022-03-25 DIAGNOSIS — Z71.89 OTHER SPECIFIED COUNSELING: ICD-10-CM

## 2022-03-25 DIAGNOSIS — F03.C0 UNSPECIFIED DEMENTIA, SEVERE, WITHOUT BEHAVIORAL DISTURBANCE, PSYCHOTIC DISTURBANCE, MOOD DISTURBANCE, AND ANXIETY: ICD-10-CM

## 2022-03-25 PROCEDURE — 99498 ADVNCD CARE PLAN ADDL 30 MIN: CPT | Mod: 95

## 2022-03-25 PROCEDURE — 99497 ADVNCD CARE PLAN 30 MIN: CPT | Mod: 95

## 2022-03-25 NOTE — HISTORY OF PRESENT ILLNESS
[FreeTextEntry1] : Patient is a 73 y/o M with significant hx of s/p left temporal cavernoma s/p resection (2004) c/b focal epilepsy, HLD, HTN, Bradycardia comes today for a family meeting via telehealth. \par \par ACP > 106   min spent \par Disease Status: PPS 30-40%\par Social Work Referral: Yes\par Hospice Eligibly/ Diagnosis: Alzheimers Dementia FAST 7D\par [facility] Participants:\par 1. Wife- Jeannie \par 2. Daughter - Jeannie Huizar\par 3. Son- Chele (the IV th)\par 4. Sister- Rama \par 5. DAVID- Kip \par 6. SW- Patricia Tafoya\par 7. Fellow physicain Dr Mcgraw \par \par [facility] Discussion:  Diagnosis of probable advancing end stage dementia, what advanced dementia looks like, introduced HCP, MOLST, Hospice. \par \par Patient verbally expressed wife Jeannie would be his HCP. \par There is a complex social dynamic between wife Jeannie and Sister Rama. Rama is having a hard time to process that dementia is not reversible. We discussed the importance of 24/7 supervision. At this time wife Jeannie is caring for patient alone. He is bedbound and a 2 person assist. Physical therapy services have been exhausted an no longer serve as benefit to the patient. We discussed depression, aggitation, anxiety are symptoms we can try to help control via geriatric psychiatry. We discussed that there may be complications of aspiration pneumonia, UTI, bed sores, uncontrolled seizures that can arise as complications during advanced dementia. \par \par Patient has a living will which as per wife states he would want to "allow for a natural death." They agree for hospice referral. HCP witness by us via telehealth was stated to be wife Jeannie on previous visit. Sister Rama is not cooperative findings of this discussion, but understands this is a discussion about end of life. Her /patients DAVID Kip supports patient and his family. \par \par Will continue to follow as palliative to bridge patient to hospice. \par

## 2022-03-25 NOTE — DATA REVIEWED
[FreeTextEntry1] : CT 9/2021 noted- periventricular white matter changes/ ischemia; moderate atrophy of the brain. temporal lobe noted for previous craniotomy

## 2022-03-25 NOTE — REASON FOR VISIT
[Home] : at home, [unfilled] , at the time of the visit. [Medical Office: ([facility])___] : at the medical office located in  [Family Member] : family member [Verbal consent obtained from patient] : the patient, [unfilled] [FreeTextEntry4] : Jeannie- Wife

## 2022-03-26 ENCOUNTER — NON-APPOINTMENT (OUTPATIENT)
Age: 72
End: 2022-03-26

## 2022-03-27 ENCOUNTER — NON-APPOINTMENT (OUTPATIENT)
Age: 72
End: 2022-03-27

## 2022-03-27 ENCOUNTER — INPATIENT (INPATIENT)
Facility: HOSPITAL | Age: 72
LOS: 3 days | Discharge: HOSPICE | DRG: 871 | End: 2022-03-31
Attending: FAMILY MEDICINE | Admitting: HOSPITALIST
Payer: MEDICARE

## 2022-03-27 VITALS
HEART RATE: 142 BPM | DIASTOLIC BLOOD PRESSURE: 79 MMHG | HEIGHT: 74 IN | OXYGEN SATURATION: 99 % | WEIGHT: 199.96 LBS | SYSTOLIC BLOOD PRESSURE: 150 MMHG | TEMPERATURE: 98 F

## 2022-03-27 DIAGNOSIS — G40.909 EPILEPSY, UNSPECIFIED, NOT INTRACTABLE, WITHOUT STATUS EPILEPTICUS: ICD-10-CM

## 2022-03-27 DIAGNOSIS — Z98.890 OTHER SPECIFIED POSTPROCEDURAL STATES: Chronic | ICD-10-CM

## 2022-03-27 DIAGNOSIS — Z71.89 OTHER SPECIFIED COUNSELING: ICD-10-CM

## 2022-03-27 DIAGNOSIS — Z29.9 ENCOUNTER FOR PROPHYLACTIC MEASURES, UNSPECIFIED: ICD-10-CM

## 2022-03-27 DIAGNOSIS — A41.9 SEPSIS, UNSPECIFIED ORGANISM: ICD-10-CM

## 2022-03-27 DIAGNOSIS — J69.0 PNEUMONITIS DUE TO INHALATION OF FOOD AND VOMIT: ICD-10-CM

## 2022-03-27 DIAGNOSIS — I48.91 UNSPECIFIED ATRIAL FIBRILLATION: ICD-10-CM

## 2022-03-27 LAB
ALBUMIN SERPL ELPH-MCNC: 2.6 G/DL — LOW (ref 3.3–5)
ALBUMIN SERPL ELPH-MCNC: 3.7 G/DL — SIGNIFICANT CHANGE UP (ref 3.3–5)
ALP SERPL-CCNC: 50 U/L — SIGNIFICANT CHANGE UP (ref 40–120)
ALP SERPL-CCNC: 77 U/L — SIGNIFICANT CHANGE UP (ref 40–120)
ALT FLD-CCNC: 9 U/L — LOW (ref 10–45)
ALT FLD-CCNC: <5 U/L — LOW (ref 10–45)
AMMONIA BLD-MCNC: 30 UMOL/L — SIGNIFICANT CHANGE UP (ref 11–55)
ANION GAP SERPL CALC-SCNC: 17 MMOL/L — SIGNIFICANT CHANGE UP (ref 5–17)
ANION GAP SERPL CALC-SCNC: 18 MMOL/L — HIGH (ref 5–17)
ANION GAP SERPL CALC-SCNC: 18 MMOL/L — HIGH (ref 5–17)
ANION GAP SERPL CALC-SCNC: 22 MMOL/L — HIGH (ref 5–17)
AST SERPL-CCNC: 19 U/L — SIGNIFICANT CHANGE UP (ref 10–40)
AST SERPL-CCNC: 29 U/L — SIGNIFICANT CHANGE UP (ref 10–40)
BASE EXCESS BLDV CALC-SCNC: 3.8 MMOL/L — HIGH (ref -2–2)
BASE EXCESS BLDV CALC-SCNC: 4 MMOL/L — HIGH (ref -2–2)
BASOPHILS # BLD AUTO: 0.07 K/UL — SIGNIFICANT CHANGE UP (ref 0–0.2)
BASOPHILS NFR BLD AUTO: 0.5 % — SIGNIFICANT CHANGE UP (ref 0–2)
BILIRUB SERPL-MCNC: 0.8 MG/DL — SIGNIFICANT CHANGE UP (ref 0.2–1.2)
BILIRUB SERPL-MCNC: 1.2 MG/DL — SIGNIFICANT CHANGE UP (ref 0.2–1.2)
BUN SERPL-MCNC: 14 MG/DL — SIGNIFICANT CHANGE UP (ref 7–23)
BUN SERPL-MCNC: 14 MG/DL — SIGNIFICANT CHANGE UP (ref 7–23)
BUN SERPL-MCNC: 16 MG/DL — SIGNIFICANT CHANGE UP (ref 7–23)
BUN SERPL-MCNC: 16 MG/DL — SIGNIFICANT CHANGE UP (ref 7–23)
CA-I SERPL-SCNC: 1.12 MMOL/L — LOW (ref 1.15–1.33)
CA-I SERPL-SCNC: 1.13 MMOL/L — LOW (ref 1.15–1.33)
CALCIUM SERPL-MCNC: 7.1 MG/DL — LOW (ref 8.4–10.5)
CALCIUM SERPL-MCNC: 7.4 MG/DL — LOW (ref 8.4–10.5)
CALCIUM SERPL-MCNC: 8.5 MG/DL — SIGNIFICANT CHANGE UP (ref 8.4–10.5)
CALCIUM SERPL-MCNC: 9.4 MG/DL — SIGNIFICANT CHANGE UP (ref 8.4–10.5)
CHLORIDE BLDV-SCNC: 100 MMOL/L — SIGNIFICANT CHANGE UP (ref 96–108)
CHLORIDE BLDV-SCNC: 97 MMOL/L — SIGNIFICANT CHANGE UP (ref 96–108)
CHLORIDE SERPL-SCNC: 100 MMOL/L — SIGNIFICANT CHANGE UP (ref 96–108)
CHLORIDE SERPL-SCNC: 80 MMOL/L — LOW (ref 96–108)
CHLORIDE SERPL-SCNC: 93 MMOL/L — LOW (ref 96–108)
CHLORIDE SERPL-SCNC: 99 MMOL/L — SIGNIFICANT CHANGE UP (ref 96–108)
CK MB BLD-MCNC: 2.8 % — SIGNIFICANT CHANGE UP (ref 0–3.5)
CK MB CFR SERPL CALC: 2.7 NG/ML — SIGNIFICANT CHANGE UP (ref 0–6.7)
CK SERPL-CCNC: 98 U/L — SIGNIFICANT CHANGE UP (ref 30–200)
CO2 BLDV-SCNC: 32 MMOL/L — HIGH (ref 22–26)
CO2 BLDV-SCNC: 32 MMOL/L — HIGH (ref 22–26)
CO2 SERPL-SCNC: 19 MMOL/L — LOW (ref 22–31)
CO2 SERPL-SCNC: 19 MMOL/L — LOW (ref 22–31)
CO2 SERPL-SCNC: 23 MMOL/L — SIGNIFICANT CHANGE UP (ref 22–31)
CO2 SERPL-SCNC: 24 MMOL/L — SIGNIFICANT CHANGE UP (ref 22–31)
CREAT SERPL-MCNC: 0.83 MG/DL — SIGNIFICANT CHANGE UP (ref 0.5–1.3)
CREAT SERPL-MCNC: 0.88 MG/DL — SIGNIFICANT CHANGE UP (ref 0.5–1.3)
CREAT SERPL-MCNC: 0.91 MG/DL — SIGNIFICANT CHANGE UP (ref 0.5–1.3)
CREAT SERPL-MCNC: 1.11 MG/DL — SIGNIFICANT CHANGE UP (ref 0.5–1.3)
EGFR: 70 ML/MIN/1.73M2 — SIGNIFICANT CHANGE UP
EGFR: 88 ML/MIN/1.73M2 — SIGNIFICANT CHANGE UP
EGFR: 90 ML/MIN/1.73M2 — SIGNIFICANT CHANGE UP
EGFR: 92 ML/MIN/1.73M2 — SIGNIFICANT CHANGE UP
EOSINOPHIL # BLD AUTO: 0 K/UL — SIGNIFICANT CHANGE UP (ref 0–0.5)
EOSINOPHIL NFR BLD AUTO: 0 % — SIGNIFICANT CHANGE UP (ref 0–6)
FLUAV AG NPH QL: SIGNIFICANT CHANGE UP
FLUBV AG NPH QL: SIGNIFICANT CHANGE UP
GAS PNL BLDV: 136 MMOL/L — SIGNIFICANT CHANGE UP (ref 136–145)
GAS PNL BLDV: 140 MMOL/L — SIGNIFICANT CHANGE UP (ref 136–145)
GAS PNL BLDV: SIGNIFICANT CHANGE UP
GLUCOSE BLDV-MCNC: 108 MG/DL — HIGH (ref 70–99)
GLUCOSE BLDV-MCNC: 81 MG/DL — SIGNIFICANT CHANGE UP (ref 70–99)
GLUCOSE SERPL-MCNC: 100 MG/DL — HIGH (ref 70–99)
GLUCOSE SERPL-MCNC: 115 MG/DL — HIGH (ref 70–99)
GLUCOSE SERPL-MCNC: 74 MG/DL — SIGNIFICANT CHANGE UP (ref 70–99)
GLUCOSE SERPL-MCNC: 93 MG/DL — SIGNIFICANT CHANGE UP (ref 70–99)
HCO3 BLDV-SCNC: 30 MMOL/L — HIGH (ref 22–29)
HCO3 BLDV-SCNC: 31 MMOL/L — HIGH (ref 22–29)
HCT VFR BLD CALC: 44.2 % — SIGNIFICANT CHANGE UP (ref 39–50)
HCT VFR BLDA CALC: 39 % — SIGNIFICANT CHANGE UP (ref 39–51)
HCT VFR BLDA CALC: 42 % — SIGNIFICANT CHANGE UP (ref 39–51)
HGB BLD CALC-MCNC: 13 G/DL — SIGNIFICANT CHANGE UP (ref 12.6–17.4)
HGB BLD CALC-MCNC: 13.9 G/DL — SIGNIFICANT CHANGE UP (ref 12.6–17.4)
HGB BLD-MCNC: 15.2 G/DL — SIGNIFICANT CHANGE UP (ref 13–17)
IMM GRANULOCYTES NFR BLD AUTO: 1.8 % — HIGH (ref 0–1.5)
LACTATE BLDV-MCNC: 4.7 MMOL/L — CRITICAL HIGH (ref 0.7–2)
LACTATE BLDV-MCNC: 5.9 MMOL/L — CRITICAL HIGH (ref 0.7–2)
LIDOCAIN IGE QN: 12 U/L — SIGNIFICANT CHANGE UP (ref 7–60)
LYMPHOCYTES # BLD AUTO: 31 % — SIGNIFICANT CHANGE UP (ref 13–44)
LYMPHOCYTES # BLD AUTO: 4.25 K/UL — HIGH (ref 1–3.3)
MAGNESIUM SERPL-MCNC: 1.4 MG/DL — LOW (ref 1.6–2.6)
MAGNESIUM SERPL-MCNC: 1.5 MG/DL — LOW (ref 1.6–2.6)
MAGNESIUM SERPL-MCNC: 1.8 MG/DL — SIGNIFICANT CHANGE UP (ref 1.6–2.6)
MANUAL SMEAR VERIFICATION: SIGNIFICANT CHANGE UP
MCHC RBC-ENTMCNC: 33.6 PG — SIGNIFICANT CHANGE UP (ref 27–34)
MCHC RBC-ENTMCNC: 34.4 GM/DL — SIGNIFICANT CHANGE UP (ref 32–36)
MCV RBC AUTO: 97.8 FL — SIGNIFICANT CHANGE UP (ref 80–100)
MONOCYTES # BLD AUTO: 1.44 K/UL — HIGH (ref 0–0.9)
MONOCYTES NFR BLD AUTO: 10.5 % — SIGNIFICANT CHANGE UP (ref 2–14)
NEUTROPHILS # BLD AUTO: 7.71 K/UL — HIGH (ref 1.8–7.4)
NEUTROPHILS NFR BLD AUTO: 56.2 % — SIGNIFICANT CHANGE UP (ref 43–77)
NRBC # BLD: 0 /100 WBCS — SIGNIFICANT CHANGE UP (ref 0–0)
NT-PROBNP SERPL-SCNC: 2547 PG/ML — HIGH (ref 0–300)
PCO2 BLDV: 53 MMHG — SIGNIFICANT CHANGE UP (ref 42–55)
PCO2 BLDV: 54 MMHG — SIGNIFICANT CHANGE UP (ref 42–55)
PH BLDV: 7.36 — SIGNIFICANT CHANGE UP (ref 7.32–7.43)
PH BLDV: 7.37 — SIGNIFICANT CHANGE UP (ref 7.32–7.43)
PHOSPHATE SERPL-MCNC: 2.3 MG/DL — LOW (ref 2.5–4.5)
PLAT MORPH BLD: NORMAL — SIGNIFICANT CHANGE UP
PLATELET # BLD AUTO: 129 K/UL — LOW (ref 150–400)
PO2 BLDV: 33 MMHG — SIGNIFICANT CHANGE UP (ref 25–45)
PO2 BLDV: 38 MMHG — SIGNIFICANT CHANGE UP (ref 25–45)
POTASSIUM BLDV-SCNC: 3.3 MMOL/L — LOW (ref 3.5–5.1)
POTASSIUM BLDV-SCNC: 4 MMOL/L — SIGNIFICANT CHANGE UP (ref 3.5–5.1)
POTASSIUM SERPL-MCNC: 3.4 MMOL/L — LOW (ref 3.5–5.3)
POTASSIUM SERPL-MCNC: 3.6 MMOL/L — SIGNIFICANT CHANGE UP (ref 3.5–5.3)
POTASSIUM SERPL-MCNC: 3.7 MMOL/L — SIGNIFICANT CHANGE UP (ref 3.5–5.3)
POTASSIUM SERPL-MCNC: 5.8 MMOL/L — HIGH (ref 3.5–5.3)
POTASSIUM SERPL-SCNC: 3.4 MMOL/L — LOW (ref 3.5–5.3)
POTASSIUM SERPL-SCNC: 3.6 MMOL/L — SIGNIFICANT CHANGE UP (ref 3.5–5.3)
POTASSIUM SERPL-SCNC: 3.7 MMOL/L — SIGNIFICANT CHANGE UP (ref 3.5–5.3)
POTASSIUM SERPL-SCNC: 5.8 MMOL/L — HIGH (ref 3.5–5.3)
PROCALCITONIN SERPL-MCNC: 0.22 NG/ML — HIGH (ref 0.02–0.1)
PROT SERPL-MCNC: 5 G/DL — LOW (ref 6–8.3)
PROT SERPL-MCNC: 7 G/DL — SIGNIFICANT CHANGE UP (ref 6–8.3)
RBC # BLD: 4.52 M/UL — SIGNIFICANT CHANGE UP (ref 4.2–5.8)
RBC # FLD: 13.6 % — SIGNIFICANT CHANGE UP (ref 10.3–14.5)
RBC BLD AUTO: NORMAL — SIGNIFICANT CHANGE UP
RSV RNA NPH QL NAA+NON-PROBE: SIGNIFICANT CHANGE UP
SAO2 % BLDV: 41 % — LOW (ref 67–88)
SAO2 % BLDV: 54 % — LOW (ref 67–88)
SARS-COV-2 RNA SPEC QL NAA+PROBE: SIGNIFICANT CHANGE UP
SODIUM SERPL-SCNC: 116 MMOL/L — CRITICAL LOW (ref 135–145)
SODIUM SERPL-SCNC: 130 MMOL/L — LOW (ref 135–145)
SODIUM SERPL-SCNC: 142 MMOL/L — SIGNIFICANT CHANGE UP (ref 135–145)
SODIUM SERPL-SCNC: 144 MMOL/L — SIGNIFICANT CHANGE UP (ref 135–145)
TROPONIN T, HIGH SENSITIVITY RESULT: 45 NG/L — SIGNIFICANT CHANGE UP (ref 0–51)
TROPONIN T, HIGH SENSITIVITY RESULT: 56 NG/L — HIGH (ref 0–51)
TSH SERPL-MCNC: 3.46 UIU/ML — SIGNIFICANT CHANGE UP (ref 0.27–4.2)
VALPROATE SERPL-MCNC: 109 UG/ML — HIGH (ref 50–100)
WBC # BLD: 13.72 K/UL — HIGH (ref 3.8–10.5)
WBC # FLD AUTO: 13.72 K/UL — HIGH (ref 3.8–10.5)

## 2022-03-27 PROCEDURE — 71275 CT ANGIOGRAPHY CHEST: CPT | Mod: 26,MA

## 2022-03-27 PROCEDURE — 93010 ELECTROCARDIOGRAM REPORT: CPT

## 2022-03-27 PROCEDURE — 99223 1ST HOSP IP/OBS HIGH 75: CPT

## 2022-03-27 PROCEDURE — 71045 X-RAY EXAM CHEST 1 VIEW: CPT | Mod: 26

## 2022-03-27 PROCEDURE — 93308 TTE F-UP OR LMTD: CPT | Mod: 26

## 2022-03-27 PROCEDURE — 99291 CRITICAL CARE FIRST HOUR: CPT | Mod: CS,GC

## 2022-03-27 RX ORDER — SODIUM CHLORIDE 9 MG/ML
4 INJECTION INTRAMUSCULAR; INTRAVENOUS; SUBCUTANEOUS EVERY 12 HOURS
Refills: 0 | Status: DISCONTINUED | OUTPATIENT
Start: 2022-03-27 | End: 2022-03-30

## 2022-03-27 RX ORDER — DILTIAZEM HCL 120 MG
10 CAPSULE, EXT RELEASE 24 HR ORAL ONCE
Refills: 0 | Status: DISCONTINUED | OUTPATIENT
Start: 2022-03-27 | End: 2022-03-27

## 2022-03-27 RX ORDER — DONEPEZIL HYDROCHLORIDE 10 MG/1
10 TABLET, FILM COATED ORAL AT BEDTIME
Refills: 0 | Status: DISCONTINUED | OUTPATIENT
Start: 2022-03-27 | End: 2022-03-29

## 2022-03-27 RX ORDER — ENOXAPARIN SODIUM 100 MG/ML
90 INJECTION SUBCUTANEOUS EVERY 12 HOURS
Refills: 0 | Status: DISCONTINUED | OUTPATIENT
Start: 2022-03-27 | End: 2022-03-29

## 2022-03-27 RX ORDER — SODIUM CHLORIDE 9 MG/ML
1000 INJECTION, SOLUTION INTRAVENOUS
Refills: 0 | Status: DISCONTINUED | OUTPATIENT
Start: 2022-03-27 | End: 2022-03-30

## 2022-03-27 RX ORDER — METOPROLOL TARTRATE 50 MG
5 TABLET ORAL EVERY 6 HOURS
Refills: 0 | Status: DISCONTINUED | OUTPATIENT
Start: 2022-03-27 | End: 2022-03-27

## 2022-03-27 RX ORDER — VALPROIC ACID (AS SODIUM SALT) 250 MG/5ML
750 SOLUTION, ORAL ORAL EVERY 6 HOURS
Refills: 0 | Status: DISCONTINUED | OUTPATIENT
Start: 2022-03-27 | End: 2022-03-28

## 2022-03-27 RX ORDER — DILTIAZEM HCL 120 MG
10 CAPSULE, EXT RELEASE 24 HR ORAL ONCE
Refills: 0 | Status: COMPLETED | OUTPATIENT
Start: 2022-03-27 | End: 2022-03-27

## 2022-03-27 RX ORDER — SODIUM CHLORIDE 9 MG/ML
1000 INJECTION, SOLUTION INTRAVENOUS ONCE
Refills: 0 | Status: COMPLETED | OUTPATIENT
Start: 2022-03-27 | End: 2022-03-27

## 2022-03-27 RX ORDER — SODIUM CHLORIDE 9 MG/ML
1000 INJECTION INTRAMUSCULAR; INTRAVENOUS; SUBCUTANEOUS ONCE
Refills: 0 | Status: COMPLETED | OUTPATIENT
Start: 2022-03-27 | End: 2022-03-27

## 2022-03-27 RX ORDER — SIMVASTATIN 20 MG/1
10 TABLET, FILM COATED ORAL AT BEDTIME
Refills: 0 | Status: DISCONTINUED | OUTPATIENT
Start: 2022-03-27 | End: 2022-03-29

## 2022-03-27 RX ORDER — ASPIRIN/CALCIUM CARB/MAGNESIUM 324 MG
81 TABLET ORAL DAILY
Refills: 0 | Status: DISCONTINUED | OUTPATIENT
Start: 2022-03-27 | End: 2022-03-29

## 2022-03-27 RX ORDER — LEVETIRACETAM 250 MG/1
1500 TABLET, FILM COATED ORAL EVERY 12 HOURS
Refills: 0 | Status: DISCONTINUED | OUTPATIENT
Start: 2022-03-27 | End: 2022-03-28

## 2022-03-27 RX ORDER — METOPROLOL TARTRATE 50 MG
10 TABLET ORAL ONCE
Refills: 0 | Status: COMPLETED | OUTPATIENT
Start: 2022-03-27 | End: 2022-03-27

## 2022-03-27 RX ORDER — SERTRALINE 25 MG/1
50 TABLET, FILM COATED ORAL DAILY
Refills: 0 | Status: DISCONTINUED | OUTPATIENT
Start: 2022-03-27 | End: 2022-03-29

## 2022-03-27 RX ORDER — AMIODARONE HYDROCHLORIDE 400 MG/1
150 TABLET ORAL ONCE
Refills: 0 | Status: COMPLETED | OUTPATIENT
Start: 2022-03-27 | End: 2022-03-27

## 2022-03-27 RX ORDER — POTASSIUM CHLORIDE 20 MEQ
10 PACKET (EA) ORAL
Refills: 0 | Status: COMPLETED | OUTPATIENT
Start: 2022-03-27 | End: 2022-03-28

## 2022-03-27 RX ORDER — PIPERACILLIN AND TAZOBACTAM 4; .5 G/20ML; G/20ML
3.38 INJECTION, POWDER, LYOPHILIZED, FOR SOLUTION INTRAVENOUS ONCE
Refills: 0 | Status: COMPLETED | OUTPATIENT
Start: 2022-03-27 | End: 2022-03-27

## 2022-03-27 RX ORDER — ACETAMINOPHEN 500 MG
1000 TABLET ORAL ONCE
Refills: 0 | Status: COMPLETED | OUTPATIENT
Start: 2022-03-27 | End: 2022-03-27

## 2022-03-27 RX ORDER — PIPERACILLIN AND TAZOBACTAM 4; .5 G/20ML; G/20ML
3.38 INJECTION, POWDER, LYOPHILIZED, FOR SOLUTION INTRAVENOUS EVERY 8 HOURS
Refills: 0 | Status: DISCONTINUED | OUTPATIENT
Start: 2022-03-27 | End: 2022-03-30

## 2022-03-27 RX ORDER — METOPROLOL TARTRATE 50 MG
2.5 TABLET ORAL EVERY 6 HOURS
Refills: 0 | Status: DISCONTINUED | OUTPATIENT
Start: 2022-03-27 | End: 2022-03-31

## 2022-03-27 RX ORDER — FINASTERIDE 5 MG/1
5 TABLET, FILM COATED ORAL DAILY
Refills: 0 | Status: DISCONTINUED | OUTPATIENT
Start: 2022-03-27 | End: 2022-03-29

## 2022-03-27 RX ORDER — LISINOPRIL 2.5 MG/1
5 TABLET ORAL DAILY
Refills: 0 | Status: DISCONTINUED | OUTPATIENT
Start: 2022-03-27 | End: 2022-03-29

## 2022-03-27 RX ORDER — POTASSIUM CHLORIDE 20 MEQ
10 PACKET (EA) ORAL ONCE
Refills: 0 | Status: COMPLETED | OUTPATIENT
Start: 2022-03-27 | End: 2022-03-27

## 2022-03-27 RX ORDER — MAGNESIUM SULFATE 500 MG/ML
1 VIAL (ML) INJECTION ONCE
Refills: 0 | Status: COMPLETED | OUTPATIENT
Start: 2022-03-27 | End: 2022-03-27

## 2022-03-27 RX ADMIN — SODIUM CHLORIDE 1000 MILLILITER(S): 9 INJECTION, SOLUTION INTRAVENOUS at 12:58

## 2022-03-27 RX ADMIN — PIPERACILLIN AND TAZOBACTAM 200 GRAM(S): 4; .5 INJECTION, POWDER, LYOPHILIZED, FOR SOLUTION INTRAVENOUS at 12:30

## 2022-03-27 RX ADMIN — SODIUM CHLORIDE 1000 MILLILITER(S): 9 INJECTION INTRAMUSCULAR; INTRAVENOUS; SUBCUTANEOUS at 12:29

## 2022-03-27 RX ADMIN — Medication 10 MILLIGRAM(S): at 18:29

## 2022-03-27 RX ADMIN — Medication 10 MILLIGRAM(S): at 13:38

## 2022-03-27 RX ADMIN — AMIODARONE HYDROCHLORIDE 600 MILLIGRAM(S): 400 TABLET ORAL at 12:38

## 2022-03-27 RX ADMIN — Medication 400 MILLIGRAM(S): at 16:51

## 2022-03-27 RX ADMIN — Medication 1000 MILLIGRAM(S): at 18:28

## 2022-03-27 RX ADMIN — Medication 10 MILLIGRAM(S): at 20:33

## 2022-03-27 RX ADMIN — Medication 100 GRAM(S): at 16:51

## 2022-03-27 RX ADMIN — Medication 100 MILLIEQUIVALENT(S): at 23:23

## 2022-03-27 RX ADMIN — Medication 100 MILLIEQUIVALENT(S): at 13:07

## 2022-03-27 NOTE — ED PROVIDER NOTE - CARE PLAN
Principal Discharge DX:	Pneumonia, aspiration   1 Principal Discharge DX:	Pneumonia, aspiration  Secondary Diagnosis:	Atrial fibrillation  Secondary Diagnosis:	Seizure disorder  Secondary Diagnosis:	Sepsis

## 2022-03-27 NOTE — H&P ADULT - NSHPLABSRESULTS_GEN_ALL_CORE
LABS:                          15.2   13.72 )-----------( 129      ( 27 Mar 2022 11:41 )             44.2     03-27    116<LL>  |  80<L>  |  14  ----------------------------<  74  3.6   |  19<L>  |  0.83    Ca    7.1<L>      27 Mar 2022 16:59  Phos  2.3     03-27  Mg     1.4     03-27    TPro  5.0<L>  /  Alb  2.6<L>  /  TBili  0.8  /  DBili  x   /  AST  29  /  ALT  <5<L>  /  AlkPhos  50  03-27    LIVER FUNCTIONS - ( 27 Mar 2022 15:01 )  Alb: 2.6 g/dL / Pro: 5.0 g/dL / ALK PHOS: 50 U/L / ALT: <5 U/L / AST: 29 U/L / GGT: x    < from: CT Angio Chest PE Protocol w/ IV Cont (03.27.22 @ 15:33) >    LUNGS AND AIRWAYS: Patent central airways.  Bilateral lower lobe passive   atelectasis.    PLEURA: Small bilateral pleural effusions. No pneumothorax.    MEDIASTINUM AND EDMUND: No lymphadenopathy. The esophagus is unremarkable.   The thyroid gland is unremarkable.    VESSELS: Left-sided arch and left-sided descending thoracic aorta. Aorta   is tortuous and ectatic. Atheromatous disease of the aorta..    No main, central, lobar, segmental,or subsegmental pulmonary embolus.    HEART: Heart size is normal. No pericardial effusion. Coronary artery   calcifications.    CHEST WALL AND LOWER NECK: Within normal limits.    VISUALIZED UPPER ABDOMEN: The left adrenal gland is thickened. Withinthe   lateral limb and body of the left adrenal gland is a 2.1 x 1.1 cm nodule.    BONES: Degenerative changes of the spine. Lipoma of the right serratus   musculature measures 1.9 x 5.3 cm.      IMPRESSION:  1.  No pulmonary embolus.  2.  Small bilateral pleural effusions and lower lobe passive atelectasis.

## 2022-03-27 NOTE — ED ADULT NURSE REASSESSMENT NOTE - NS ED NURSE REASSESS COMMENT FT1
Patient resting, responds easily to voice, breathing unlabored, on 2L NC, VSS, NSR noted on monitor, side rails raised, comfort and safety maintained, will continue to monitor HR.

## 2022-03-27 NOTE — H&P ADULT - PROBLEM SELECTOR PLAN 4
DVT: AC as above  Diet: NPO until s/s eval  DNR DNI DVT: AC as above  Diet: NPO until s/s eval  DNR DNI No feeding tube

## 2022-03-27 NOTE — ED PROVIDER NOTE - CLINICAL SUMMARY MEDICAL DECISION MAKING FREE TEXT BOX
2yom w/ pmh of meningioma and seizure disorder (on depakoate 1000 am and 1500pm) and keppra 1500 BID BIBEM for increased secretions and tachycardia to 140s, concerning for aspiration PNA now also with Afib RVR, will give IVF, abx, CT PE study, cards consult, admit to medicine once more optimized. 2yom w/ pmh of meningioma and seizure disorder (on depakoate 1000 am and 1500pm) and keppra 1500 BID BIBEM for increased secretions and tachycardia to 140s, concerning for aspiration PNA now also with Afib RVR, will give IVF, abx, CT PE study, cards consult, admit to medicine once more optimized.  Attending Susanne Sparks: 71 yo male with complex pmh presenting with increased secretions. upon arrival pt found to be in rapid afib with RVR. clinically appears dehydrated. pocus with flat ivc and concern for possible aspiration. given IVF with some improvement in heart rate. pt with evidence of multiple beats of nonsustained vtach. given amio without inmprovement. pt given 2L with some imrovement but persistently tachcyardic. afebrile while in the ed. given dose of diltiazem with improvement. with new onset afib cta ordered to further evaluate. cardiology consulted as well. pt with elevated [id number]. will d/w family goals of care and risk vs benefits for anticoagulation. pt will need admission. cardiology renato morgan and pan cultured

## 2022-03-27 NOTE — ED PROVIDER NOTE - PROGRESS NOTE DETAILS
Attending Susanne Sparks: pt appears clinically dehydrated. having intermittent episodes of tachycardia. ekg appears like rapid afib with rvr. pt without history of similar. concern for underlying infectious etiology. HR is responding to fluids. will continue IVF and likely give rate control Attending Susanne Sparks: pt with multiple episodes of nonsustained vtach. with afib will try amiodorone. for rhythm and vtach. cardiology consulted Lizy, PGY3: gave dilt 10IV, with HR improvement to 70. cardiology at bedside now. Will get CTA for concern for PE Lizy, PGY3: will get repeat bmp w/ vbg, admitted to medicine.

## 2022-03-27 NOTE — ED ADULT NURSE REASSESSMENT NOTE - NS ED NURSE REASSESS COMMENT FT1
Patient tachy to 180s, spitting up secretions, airway patent, cardizem IV push ordered by MD Medel, patient states "I feel okay," will continue to monitor HR and VS, suction at bedside, HOB raised, wife at bedside, comfort and safety maintained.

## 2022-03-27 NOTE — ED ADULT NURSE REASSESSMENT NOTE - NS ED NURSE REASSESS COMMENT FT1
Pt persistently tachycardiac, up to 160s, w/ intermittent episodes of wide complex tachycardia. MD TEENA Sparks aware, pending lab results for additional orders, no interventions requested at this time.

## 2022-03-27 NOTE — CONSULT NOTE ADULT - SUBJECTIVE AND OBJECTIVE BOX
Patient seen and evaluated at bedside    Chief Complaint: AMS, SVT    HPI:  72M w/ pmh of meningioma, advanced dementia, and seizure disorder (on depakoate 1000 am and 1500pm) and keppra 1500 BID BIBEM for increased secretions and tachycardia to 140s. For the past week or so he has had increased secretions and has been less responsive than normal. He is unable to communicate the past week, and in general does not fully communicate. Wife at bedside provided collateral information.. He has not had any fevers, but did lose continence of urine and stool. Wife did not notice any seizures or full body jerking. In the ED, patient noted to be tachycardic to 168, patient given Amiodarone 150mg and cardiology consulted. By the time patient seen at bedside, he was in SR.     PMHx:   Cavernoma    Essential hypertension    Other hyperlipidemia    Partial symptomatic epilepsy with complex partial seizures, not intractable, without status epilepticus        PSHx:   No significant past surgical history    Status post craniectomy        Allergies:  No Known Allergies      Home Meds: As per admission medication reconcilliation.     Current Medications:       FAMILY HISTORY: NC      Social History: Lives with wife, dependent on ADLs.     REVIEW OF SYSTEMS:  [ x] Unable to assess ROS due to baseline mental status       Physical Exam:  T(F): 98.9 (03-27), Max: 98.9 (03-27)  HR: 69 (03-27) (69 - 142)  BP: 137/75 (03-27) (115/76 - 150/79)  RR: 18 (03-27)  SpO2: 99% (03-27)  General: Alert, no acute distress, appears comfortable   HEENT: No scleral icterus, EOMI, no facial dysmorphia, no external ear lesions   Cardiac: Regular rate and rhythm, no murmurs, no rubs, no gallops   Pulmonary: Clear breath sounds throughout, no wheezing, no stridor, no crackles   Abdomen: Nondistended, nontender, appears soft   Skin: no obvious rash or lesions   Extremities: no LE edema  Neurological: Moving all 4 extremities, no overt focal deficits noted   Psych: normal mood and affect     Cardiovascular Diagnostic Testing:    ECG: Personally reviewed:  SR currently. ECG during SVT showing  with narrow complex QRS, regular. No P waves appreciated.     Echo: Personally reviewed:    Stress Testing:    Cath:    Imaging:    CXR: Personally reviewed    Labs: Personally reviewed                        15.2   13.72 )-----------( 129      ( 27 Mar 2022 11:41 )             44.2     03-27    130<L>  |  93<L>  |  14  ----------------------------<  93  5.8<H>   |  19<L>  |  0.88    Ca    7.4<L>      27 Mar 2022 15:01  Mg     1.5     03-27    TPro  5.0<L>  /  Alb  2.6<L>  /  TBili  0.8  /  DBili  x   /  AST  29  /  ALT  <5<L>  /  AlkPhos  50  03-27      Serum Pro-Brain Natriuretic Peptide: 2547 pg/mL (03-27 @ 11:41)        Thyroid Stimulating Hormone, Serum: 3.46 uIU/mL (03-27 @ 14:14)   Patient seen and evaluated at bedside    Chief Complaint: AMS, SVT    HPI:  72M w/ pmh of meningioma, advanced dementia, and seizure disorder (on depakoate 1000 am and 1500pm) and keppra 1500 BID BIBEM for increased secretions and tachycardia to 140s. For the past week or so he has had increased secretions and has been less responsive than normal. He is unable to communicate the past week, and in general does not fully communicate. Wife at bedside provided collateral information.. He has not had any fevers, but did lose continence of urine and stool. Wife did not notice any seizures or full body jerking. In the ED, patient noted to be tachycardic to 168, patient given Amiodarone 150mg and cardiology consulted. By the time patient seen at bedside, he was in SR.     PMHx:   Cavernoma  Essential hypertension  Other hyperlipidemia  Partial symptomatic epilepsy with complex partial seizures, not intractable, without status epilepticus    PSHx:   No significant past surgical history  Status post craniectomy    Allergies:  No Known Allergies    Home Meds: As per admission medication reconcilliation.     Current Medications:     FAMILY HISTORY: NC    Social History: Lives with wife, dependent on ADLs.     REVIEW OF SYSTEMS:  [x] Unable to assess ROS due to baseline mental status     Physical Exam:  T(F): 98.9 (03-27), Max: 98.9 (03-27)  HR: 69 (03-27) (69 - 142)  BP: 137/75 (03-27) (115/76 - 150/79)  RR: 18 (03-27)  SpO2: 99% (03-27)  General: Alert, no acute distress, appears comfortable   HEENT: No scleral icterus, EOMI, no facial dysmorphia, no external ear lesions   Cardiac: Regular rate and rhythm, no murmurs, no rubs, no gallops   Pulmonary: Clear breath sounds throughout, no wheezing, no stridor, no crackles   Abdomen: Nondistended, nontender, appears soft   Skin: no obvious rash or lesions   Extremities: no LE edema  Neurological: Moving all 4 extremities, no overt focal deficits noted   Psych: normal mood and affect     Cardiovascular Diagnostic Testing:    ECG: Personally reviewed:  SR currently. ECG during SVT showing  with narrow complex QRS, regular. No P waves appreciated.     Echo: Personally reviewed:    CXR: Personally reviewed    Labs: Personally reviewed                        15.2   13.72 )-----------( 129      ( 27 Mar 2022 11:41 )             44.2     03-27    130<L>  |  93<L>  |  14  ----------------------------<  93  5.8<H>   |  19<L>  |  0.88    Ca    7.4<L>      27 Mar 2022 15:01  Mg     1.5     03-27    TPro  5.0<L>  /  Alb  2.6<L>  /  TBili  0.8  /  DBili  x   /  AST  29  /  ALT  <5<L>  /  AlkPhos  50  03-27    Serum Pro-Brain Natriuretic Peptide: 2547 pg/mL (03-27 @ 11:41)    Thyroid Stimulating Hormone, Serum: 3.46 uIU/mL (03-27 @ 14:14

## 2022-03-27 NOTE — H&P ADULT - ASSESSMENT
72M w/ pmh of meningioma, advanced dementia (baseline bedbound, A&O1-2, referred by PMD for hospice), and seizure disorder BIBEMS for increased secretions and tachycardia to 140s, found to be in a.fib RVR, with leukocytosis and tachycardia, meeting sepsis criteria possibly 2/2 aspiration pna.  72M w/ pmh of meningioma (s/p resection 2003), HTN, HLD, advanced dementia (baseline bedbound, A&O1-2, referred by PMD for hospice), and seizure disorder (last seizure 09/2021 admitted to EMU) BIBEMS for increased secretions and tachycardia to 140s, found to be in a.fib RVR, with leukocytosis and tachycardia, meeting sepsis criteria possibly 2/2 aspiration pna.

## 2022-03-27 NOTE — CONSULT NOTE ADULT - ASSESSMENT
72M w/ pmh of meningioma, advanced dementia, and seizure disorder (on depakoate 1000 am and 1500pm) and keppra 1500 BID BIBEM for increased secretions and tachycardia to 140s. In the ED, patient with episode of SVT to 160s, cardiology consulted.     #SVT - , Appears regular and narrow complex, no P waves appreciated. Given 150mg Amiodarone by ED. By the time patient seen, he was in SR 70s. No history of dysrhythmia. Infection may be current trigger. Bedside POCUS by ED staff appeared to show preserved LVF.     Recommendations:  -Please obtain TTE.   -Would admit to telemetry.   -Would NOT continue Amiodarone.   -If patient goes into SVT again, can initially try Metoprolol 5mg IVP. If no success, can then try Diltiazem 20mg IVP.

## 2022-03-27 NOTE — ED ADULT NURSE REASSESSMENT NOTE - NS ED NURSE REASSESS COMMENT FT1
Patient tachy to 150s, admitting MD at bedside, as per MD order 10mg if metoprolol administered, will continue to monitor HR, patient placed on external catheter to collect urine sample.

## 2022-03-27 NOTE — H&P ADULT - PROBLEM SELECTOR PLAN 1
On admission pt with leukocytosis to 13.7 and tachycardia, meeting SIRS criteria with most likely respiratory source  - Failed bedside dysphagia screening, likely aspiration especially in s/o increased oral secretion  - CT chest angio without PE, b/l basilar atelectasis and pleff   - C/w zosyn (3/27- )   - NPO until s/s eval  - mIVF   - A.fib/ tachycardia likely 2/2 sepsis (see management below)   - f/u MRSA pcr, strep pneumo, legionella, sputum cx  - GOC per wife: ok with IV abx, lab draws, fluids On admission pt with leukocytosis to 13.7 and tachycardia, meeting SIRS criteria with most likely respiratory source  - Failed bedside dysphagia screening, likely aspiration especially in s/o increased oral secretion  - CT chest angio without PE, b/l basilar atelectasis and pleff   - C/w zosyn (3/27- )   - NPO until s/s eval  - mIVF, trend lactate (decreasing)   - A.fib/ tachycardia likely 2/2 sepsis (see management below)   - f/u MRSA pcr, strep pneumo, legionella, sputum cx  - GOC per wife: ok with IV abx, lab draws, fluids On admission pt with leukocytosis to 13.7 and tachycardia, meeting SIRS criteria with most likely respiratory source  - Failed bedside dysphagia screening, likely aspiration especially in s/o increased oral secretion  - CT chest angio without PE, b/l basilar atelectasis and pleff   - F/u BCx. Send UA UCx   - C/w zosyn (3/27- )   - NPO until s/s eval  - mIVF, trend lactate (decreasing)   - A.fib/ tachycardia likely 2/2 sepsis (see management below)   - f/u MRSA pcr, strep pneumo, legionella, sputum cx  - GOC per wife: ok with IV abx, lab draws, fluids

## 2022-03-27 NOTE — ED PROVIDER NOTE - OBJECTIVE STATEMENT
72yom w/ pmh of meningioma and seizure disorder (on depakoate 1000 am and 1500pm) and keppra 1500 BID BIBEM for increased secretions and tachycardia to 140s. 72yom w/ pmh of meningioma and seizure disorder (on depakoate 1000 am and 1500pm) and keppra 1500 BID BIBEM for increased secretions and tachycardia to 140s. For the past week or so he has had increased secretions and has been less responsive than normal. He is unable to communicate the past week, and in general does not fully communicate. Wife is at bedside providing info. He has not had any fevers, but did lose continence of urine and stool. He did not 72yom w/ pmh of meningioma and seizure disorder (on depakoate 1000 am and 1500pm) and keppra 1500 BID BIBEM for increased secretions and tachycardia to 140s. For the past week or so he has had increased secretions and has been less responsive than normal. He is unable to communicate the past week, and in general does not fully communicate. Wife is at bedside providing info. He has not had any fevers, but did lose continence of urine and stool. Wife did not notice any seizures or full body jerking. 72yom w/ pmh of meningioma, advanced dementia, and seizure disorder (on depakoate 1000 am and 1500pm) and keppra 1500 BID BIBEM for increased secretions and tachycardia to 140s. For the past week or so he has had increased secretions and has been less responsive than normal. He is unable to communicate the past week, and in general does not fully communicate. Wife is at bedside providing info. He has not had any fevers, but did lose continence of urine and stool. Wife did not notice any seizures or full body jerking.

## 2022-03-27 NOTE — ED ADULT NURSE NOTE - NSIMPLEMENTINTERV_GEN_ALL_ED
Implemented All Fall Risk Interventions:  [place] to call system. Call bell, personal items and telephone within reach. Instruct patient to call for assistance. Room bathroom lighting operational. Non-slip footwear when patient is off stretcher. Physically safe environment: no spills, clutter or unnecessary equipment. Stretcher in lowest position, wheels locked, appropriate side rails in place. Provide visual cue, wrist band, yellow gown, etc. Monitor gait and stability. Monitor for mental status changes and reorient to person, place, and time. Review medications for side effects contributing to fall risk. Reinforce activity limits and safety measures with patient and family.

## 2022-03-27 NOTE — H&P ADULT - PROBLEM SELECTOR PLAN 2
New onset a.fib with RVR; likely 2/2 sepsis   - CHADSVASc:  ; HASBLED    - Cardiology following, appreciate recs  - Will f/u TTE  - ECG without ST elevation, trops non ischemic   - Check TSH   - Lovenox 1mg/kg BID  - Metoprolol 5mg q6 for now until pt able to tolerate PO New onset a.fib with RVR; likely 2/2 sepsis   - CHADSVASc: 2 ; HASBLED: 3    - Cardiology following, appreciate recs  - Will f/u TTE  - ECG without ST elevation, trops non ischemic   - Check TSH   - Lovenox 1mg/kg BID  - Metoprolol 5mg q6 for now until pt able to tolerate PO New onset a.fib with RVR; likely 2/2 sepsis   - CHADSVASc: 2 ; HASBLED: 3    - Cardiology following, appreciate recs  - Will f/u TTE  - ECG without ST elevation, trops non ischemic   - Check TSH   - Lovenox 1mg/kg BID (Will clarify with neuro if cleared for AC given h/o seizures and meningioma s/p resection 2003 )  - Metoprolol 5mg q6 for now until pt able to tolerate PO New onset a.fib with RVR; likely 2/2 sepsis   - CHADSVASc: 2 ; HASBLED: 3    - Cardiology following, appreciate recs  - Will f/u TTE  - ECG without ST elevation, trops non ischemic   - Check TSH   - Lovenox 1mg/kg BID - discussed risk and benefits, wife would like to start AC at this time. (Will also clarify with neuro in am if any issues with AC given h/o seizures and meningioma s/p resection 2003 )  - Metoprolol 5mg q6 for now until pt able to tolerate PO

## 2022-03-27 NOTE — ED PROVIDER NOTE - MUSCULOSKELETAL, MLM
Spine appears normal, range of motion is not limited, no muscle or joint tenderness. RLE larger than LLE

## 2022-03-27 NOTE — ED PROVIDER NOTE - ATTENDING CONTRIBUTION TO CARE
Attending MD Susanne Sparks:  I personally have seen and examined this patient.  Resident note reviewed and agree on plan of care and except where noted.  See HPI, PE, and MDM for details.

## 2022-03-27 NOTE — H&P ADULT - HISTORY OF PRESENT ILLNESS
72M w/ pmh of meningioma, advanced dementia, and seizure disorder (on depakoate 1000 am and 1500pm) and keppra 1500 BID BIBEM for increased secretions and tachycardia to 140s. For the past week or so he has had increased secretions and has been less responsive than normal. He is unable to communicate the past week, and in general does not fully communicate. Wife at bedside provided collateral information    In ED, pt tachycardic to 168, given amio 150mg x1 with improvement in HR. Pt then had recurrent episodes, dilt 20mg x 2 given.  72M w/ pmh of meningioma, advanced dementia (baseline bedbound, A&O1-2, referred by PMD for hospice), and seizure disorder BIBEM for increased secretions and tachycardia to 140s. Pt is A&O 2 at time of interview, however lacks insight to medical condition, hx provided by wife at bedside.     Wife states that pt has had increased oral secretions along with decreased level of responsiveness in the last week. He is noted to choke and cough more as he tries to spit out mucus. He is incontinent at baseline, denies dysuria, fevers (Tmax at home 99.7), nausea, vomiting. Wife reports few loose bowel movements non bloody. Wife is sole care taker and reports increasing difficulty providing care for patient has he has been entirely bed bound for the last 3 months, without any equipment at home, wife has had to frequently reposition him to prevent aspiration of oral secretions. Wife had a family meeting on Friday 3/25 with [facility] Dr. Elena and was referred to hospice. Per wife, hospice was due to evaluate pt either today or tomorrow.     In ED, pt tachycardic to 168, tele strip and ECG indicated a.fib with RVR. Pt was given amio 150mg x1 with improvement in HR. Pt then had recurrent episodes, dilt 20mg x 2 given. BP 150s/70s, SpO2 99%, placed on 2L oxygen given RVR. Cardiology consulted and pt at the time already converted back to sinus. Pt additionally received zosyn, LR and NS 1L bolus each. KCl 10mEq x 1, Mg 1g.  72M w/ pmh of meningioma (s/p resection 2003), HTN, HLD, advanced dementia (baseline bedbound, A&O1-2, referred by PMD for hospice), and seizure disorder (last seizure 09/2021 admitted to EMU) BIBEMS for increased secretions and tachycardia to 140s. Pt is A&O 2 at time of interview, however lacks insight to medical condition, hx provided by wife at bedside.     Wife states that pt has had increased oral secretions along with decreased level of responsiveness in the last week. He is noted to choke and cough more as he tries to spit out mucus. He is incontinent at baseline, denies dysuria, fevers (Tmax at home 99.7), nausea, vomiting. Wife reports few loose bowel movements non bloody. Wife is sole care taker and reports increasing difficulty providing care for patient has he has been entirely bed bound for the last 3 months, without any equipment at home, wife has had to frequently reposition him to prevent aspiration of oral secretions. Wife had a family meeting on Friday 3/25 with [facility] Dr. Elena and was referred to hospice. Per wife, hospice was due to evaluate pt either today or tomorrow.     In ED, pt tachycardic to 168, tele strip and ECG indicated a.fib with RVR. Pt was given amio 150mg x1 with improvement in HR. Pt then had recurrent episodes, dilt 20mg x 2 given. BP 150s/70s, SpO2 99%, placed on 2L oxygen given RVR. Cardiology consulted and pt at the time already converted back to sinus. Pt additionally received zosyn, LR and NS 1L bolus each. KCl 10mEq x 1, Mg 1g.

## 2022-03-27 NOTE — ED PROVIDER NOTE - PHYSICAL EXAMINATION
Attending Susanne Sparks: Gen: ill appearing, heent: atrauamtic,dry mucous membranes, op pink, uvula midline, neck; nttp, no nuchal rigidity, chest: nttp, no crepitus, cv:tachycardic, lungs: decreased at bases abd: soft, nontender, nondistended, no peritoneal signs, no guarding, ext: wwp, , skin: no rash, neuro: awake and alert, following commands,

## 2022-03-27 NOTE — H&P ADULT - PROBLEM SELECTOR PLAN 5
Wife expressed interest in hospice. As per discussion with outpt PMD, pt's prognosis given advanced dementia is poor and that at this time, wife and pt prefer going home with home hospice. Wife also states pt would NOT want any artificial life sustaining measures and would prefer a natural death.   DNR DNI NO feeding tube. OK with abx and lab draws.   ANA signed, in chart. Wife expressed interest in hospice. As per discussion with outpt PMD, pt's prognosis given advanced dementia is poor and that at this time, wife and pt prefer going home with home hospice. Wife also states pt would NOT want any artificial life sustaining measures and would prefer a natural death.     DNR DNI NO feeding tube. OK with abx and lab draws.   ANA signed, in chart.    Hospice referral placed. WIll reach out to CM in am.

## 2022-03-27 NOTE — H&P ADULT - ATTENDING COMMENTS
72M w/ pmh of meningioma (s/p resection 2003), HTN, HLD, advanced dementia (baseline bedbound, A&O1-2, referred by PMD for hospice), and seizure disorder  p/w  tachycardia, increased secretions dn dyspnea. on presentation in afib w/ RVR  s/p amio , s/p dilt , currently afebrile , normotensive , labs w/ leukocytosis , elevated lactate , CTA chest w/ no PE , b/l pleural effusions;   Patient meets sepsis criteria likely in setting of aspiration pneumonia , will continue antibiotics , continue oxygen supplementation as needed , Speech and swallow evaluation ;  can treat afib w/ anticoagulation if within GOC of family , rate control as needed. continue antiepileptics.

## 2022-03-27 NOTE — H&P ADULT - PROBLEM SELECTOR PLAN 3
C/w home meds  - Wife denies any seizure like activity at home. Pt at baseline is incontinent (both urine and BM)   - Seizure precautions C/w home meds  - Wife denies any seizure like activity at home. Pt at baseline is incontinent (both urine and BM)   - Seizure precautions  - Will clarify with neuro if cleared for AC given h/o seizures and meningioma s/p resection 2003

## 2022-03-27 NOTE — H&P ADULT - NSHPREVIEWOFSYSTEMS_GEN_ALL_CORE
REVIEW OF SYSTEMS:  CONSTITUTIONAL: (+) weakness, fevers or chills  EYES: no blurry vision or eye pain.   ENT: No throat pain. No dysphagia.    NECK: No pain or stiffness  RESPIRATORY: (+) cough, wheezing, hemoptysis; No shortness of breath  CARDIOVASCULAR: No chest pain or palpitations.  GASTROINTESTINAL: No abdominal pain. No nausea or vomiting; No diarrhea or constipation. No melena or hematochezia.  GENITOURINARY: No dysuria, frequency or hematuria  NEUROLOGICAL: No numbness or weakness. No dizziness or falls.   SKIN: No itching, burning, rashes, or lesions.   LYMPHATIC: No masses or swelling.   All other review of systems is negative unless indicated above.

## 2022-03-27 NOTE — ED ADULT NURSE REASSESSMENT NOTE - NS ED NURSE REASSESS COMMENT FT1
Patient HR intermittently inc. from 70s to 120s/130s, not sustaining at this time, as per admitting MD hold off on cardizem, will continue to monitor HR.

## 2022-03-28 ENCOUNTER — NON-APPOINTMENT (OUTPATIENT)
Age: 72
End: 2022-03-28

## 2022-03-28 DIAGNOSIS — F03.90 UNSPECIFIED DEMENTIA WITHOUT BEHAVIORAL DISTURBANCE: ICD-10-CM

## 2022-03-28 DIAGNOSIS — R53.2 FUNCTIONAL QUADRIPLEGIA: ICD-10-CM

## 2022-03-28 DIAGNOSIS — Z51.5 ENCOUNTER FOR PALLIATIVE CARE: ICD-10-CM

## 2022-03-28 DIAGNOSIS — Z71.89 OTHER SPECIFIED COUNSELING: ICD-10-CM

## 2022-03-28 DIAGNOSIS — J69.0 PNEUMONITIS DUE TO INHALATION OF FOOD AND VOMIT: ICD-10-CM

## 2022-03-28 LAB
ALBUMIN SERPL ELPH-MCNC: 3.3 G/DL — SIGNIFICANT CHANGE UP (ref 3.3–5)
ALP SERPL-CCNC: 70 U/L — SIGNIFICANT CHANGE UP (ref 40–120)
ALT FLD-CCNC: 7 U/L — LOW (ref 10–45)
ANION GAP SERPL CALC-SCNC: 17 MMOL/L — SIGNIFICANT CHANGE UP (ref 5–17)
AST SERPL-CCNC: 15 U/L — SIGNIFICANT CHANGE UP (ref 10–40)
BASE EXCESS BLDV CALC-SCNC: 5.2 MMOL/L — HIGH (ref -2–2)
BASOPHILS # BLD AUTO: 0.07 K/UL — SIGNIFICANT CHANGE UP (ref 0–0.2)
BASOPHILS NFR BLD AUTO: 0.9 % — SIGNIFICANT CHANGE UP (ref 0–2)
BILIRUB SERPL-MCNC: 0.9 MG/DL — SIGNIFICANT CHANGE UP (ref 0.2–1.2)
BUN SERPL-MCNC: 18 MG/DL — SIGNIFICANT CHANGE UP (ref 7–23)
CA-I SERPL-SCNC: 1.14 MMOL/L — LOW (ref 1.15–1.33)
CALCIUM SERPL-MCNC: 9.1 MG/DL — SIGNIFICANT CHANGE UP (ref 8.4–10.5)
CHLORIDE BLDV-SCNC: 103 MMOL/L — SIGNIFICANT CHANGE UP (ref 96–108)
CHLORIDE SERPL-SCNC: 100 MMOL/L — SIGNIFICANT CHANGE UP (ref 96–108)
CO2 BLDV-SCNC: 33 MMOL/L — HIGH (ref 22–26)
CO2 SERPL-SCNC: 27 MMOL/L — SIGNIFICANT CHANGE UP (ref 22–31)
CREAT SERPL-MCNC: 0.94 MG/DL — SIGNIFICANT CHANGE UP (ref 0.5–1.3)
EGFR: 85 ML/MIN/1.73M2 — SIGNIFICANT CHANGE UP
EOSINOPHIL # BLD AUTO: 0 K/UL — SIGNIFICANT CHANGE UP (ref 0–0.5)
EOSINOPHIL NFR BLD AUTO: 0 % — SIGNIFICANT CHANGE UP (ref 0–6)
GAS PNL BLDV: 138 MMOL/L — SIGNIFICANT CHANGE UP (ref 136–145)
GAS PNL BLDV: SIGNIFICANT CHANGE UP
GLUCOSE BLDC GLUCOMTR-MCNC: 89 MG/DL — SIGNIFICANT CHANGE UP (ref 70–99)
GLUCOSE BLDV-MCNC: 80 MG/DL — SIGNIFICANT CHANGE UP (ref 70–99)
GLUCOSE SERPL-MCNC: 84 MG/DL — SIGNIFICANT CHANGE UP (ref 70–99)
GRAM STN FLD: SIGNIFICANT CHANGE UP
HCO3 BLDV-SCNC: 32 MMOL/L — HIGH (ref 22–29)
HCT VFR BLD CALC: 39.7 % — SIGNIFICANT CHANGE UP (ref 39–50)
HCT VFR BLDA CALC: 42 % — SIGNIFICANT CHANGE UP (ref 39–51)
HGB BLD CALC-MCNC: 13.9 G/DL — SIGNIFICANT CHANGE UP (ref 12.6–17.4)
HGB BLD-MCNC: 13.7 G/DL — SIGNIFICANT CHANGE UP (ref 13–17)
LACTATE BLDV-MCNC: 5.7 MMOL/L — CRITICAL HIGH (ref 0.7–2)
LACTATE SERPL-SCNC: 4.7 MMOL/L — CRITICAL HIGH (ref 0.7–2)
LYMPHOCYTES # BLD AUTO: 1.63 K/UL — SIGNIFICANT CHANGE UP (ref 1–3.3)
LYMPHOCYTES # BLD AUTO: 22.6 % — SIGNIFICANT CHANGE UP (ref 13–44)
MAGNESIUM SERPL-MCNC: 2 MG/DL — SIGNIFICANT CHANGE UP (ref 1.6–2.6)
MANUAL SMEAR VERIFICATION: SIGNIFICANT CHANGE UP
MCHC RBC-ENTMCNC: 33.9 PG — SIGNIFICANT CHANGE UP (ref 27–34)
MCHC RBC-ENTMCNC: 34.5 GM/DL — SIGNIFICANT CHANGE UP (ref 32–36)
MCV RBC AUTO: 98.3 FL — SIGNIFICANT CHANGE UP (ref 80–100)
MONOCYTES # BLD AUTO: 0.63 K/UL — SIGNIFICANT CHANGE UP (ref 0–0.9)
MONOCYTES NFR BLD AUTO: 8.7 % — SIGNIFICANT CHANGE UP (ref 2–14)
MRSA PCR RESULT.: SIGNIFICANT CHANGE UP
NEUTROPHILS # BLD AUTO: 4.9 K/UL — SIGNIFICANT CHANGE UP (ref 1.8–7.4)
NEUTROPHILS NFR BLD AUTO: 64.3 % — SIGNIFICANT CHANGE UP (ref 43–77)
NEUTS BAND # BLD: 3.5 % — SIGNIFICANT CHANGE UP (ref 0–8)
NRBC # BLD: 1 /100 — HIGH (ref 0–0)
PCO2 BLDV: 52 MMHG — SIGNIFICANT CHANGE UP (ref 42–55)
PH BLDV: 7.39 — SIGNIFICANT CHANGE UP (ref 7.32–7.43)
PHOSPHATE SERPL-MCNC: 2.7 MG/DL — SIGNIFICANT CHANGE UP (ref 2.5–4.5)
PLAT MORPH BLD: NORMAL — SIGNIFICANT CHANGE UP
PLATELET # BLD AUTO: 91 K/UL — LOW (ref 150–400)
PO2 BLDV: 43 MMHG — SIGNIFICANT CHANGE UP (ref 25–45)
POTASSIUM BLDV-SCNC: 3.7 MMOL/L — SIGNIFICANT CHANGE UP (ref 3.5–5.1)
POTASSIUM SERPL-MCNC: 4 MMOL/L — SIGNIFICANT CHANGE UP (ref 3.5–5.3)
POTASSIUM SERPL-SCNC: 4 MMOL/L — SIGNIFICANT CHANGE UP (ref 3.5–5.3)
PROT SERPL-MCNC: 5.9 G/DL — LOW (ref 6–8.3)
RBC # BLD: 4.04 M/UL — LOW (ref 4.2–5.8)
RBC # FLD: 13.7 % — SIGNIFICANT CHANGE UP (ref 10.3–14.5)
RBC BLD AUTO: NORMAL — SIGNIFICANT CHANGE UP
S AUREUS DNA NOSE QL NAA+PROBE: SIGNIFICANT CHANGE UP
SAO2 % BLDV: 72.5 % — SIGNIFICANT CHANGE UP (ref 67–88)
SODIUM SERPL-SCNC: 144 MMOL/L — SIGNIFICANT CHANGE UP (ref 135–145)
SPECIMEN SOURCE: SIGNIFICANT CHANGE UP
TSH SERPL-MCNC: 2.27 UIU/ML — SIGNIFICANT CHANGE UP (ref 0.27–4.2)
WBC # BLD: 7.23 K/UL — SIGNIFICANT CHANGE UP (ref 3.8–10.5)
WBC # FLD AUTO: 7.23 K/UL — SIGNIFICANT CHANGE UP (ref 3.8–10.5)

## 2022-03-28 PROCEDURE — 99223 1ST HOSP IP/OBS HIGH 75: CPT

## 2022-03-28 PROCEDURE — 99233 SBSQ HOSP IP/OBS HIGH 50: CPT | Mod: GC

## 2022-03-28 PROCEDURE — 93010 ELECTROCARDIOGRAM REPORT: CPT

## 2022-03-28 PROCEDURE — 99497 ADVNCD CARE PLAN 30 MIN: CPT | Mod: 25

## 2022-03-28 RX ORDER — HYDROMORPHONE HYDROCHLORIDE 2 MG/ML
0.2 INJECTION INTRAMUSCULAR; INTRAVENOUS; SUBCUTANEOUS
Refills: 0 | Status: DISCONTINUED | OUTPATIENT
Start: 2022-03-28 | End: 2022-03-29

## 2022-03-28 RX ORDER — LACOSAMIDE 50 MG/1
200 TABLET ORAL ONCE
Refills: 0 | Status: DISCONTINUED | OUTPATIENT
Start: 2022-03-28 | End: 2022-03-28

## 2022-03-28 RX ORDER — LACOSAMIDE 50 MG/1
100 TABLET ORAL EVERY 12 HOURS
Refills: 0 | Status: DISCONTINUED | OUTPATIENT
Start: 2022-03-28 | End: 2022-03-31

## 2022-03-28 RX ORDER — ONDANSETRON 8 MG/1
4 TABLET, FILM COATED ORAL EVERY 8 HOURS
Refills: 0 | Status: DISCONTINUED | OUTPATIENT
Start: 2022-03-28 | End: 2022-03-31

## 2022-03-28 RX ORDER — LEVETIRACETAM 250 MG/1
1000 TABLET, FILM COATED ORAL
Refills: 0 | Status: DISCONTINUED | OUTPATIENT
Start: 2022-03-28 | End: 2022-03-30

## 2022-03-28 RX ORDER — ROBINUL 0.2 MG/ML
0.4 INJECTION INTRAMUSCULAR; INTRAVENOUS EVERY 6 HOURS
Refills: 0 | Status: DISCONTINUED | OUTPATIENT
Start: 2022-03-28 | End: 2022-03-30

## 2022-03-28 RX ORDER — VALPROIC ACID (AS SODIUM SALT) 250 MG/5ML
750 SOLUTION, ORAL ORAL EVERY 8 HOURS
Refills: 0 | Status: DISCONTINUED | OUTPATIENT
Start: 2022-03-28 | End: 2022-03-31

## 2022-03-28 RX ORDER — METOPROLOL TARTRATE 50 MG
2.5 TABLET ORAL ONCE
Refills: 0 | Status: DISCONTINUED | OUTPATIENT
Start: 2022-03-28 | End: 2022-03-28

## 2022-03-28 RX ADMIN — ROBINUL 0.4 MILLIGRAM(S): 0.2 INJECTION INTRAMUSCULAR; INTRAVENOUS at 18:26

## 2022-03-28 RX ADMIN — Medication 2.5 MILLIGRAM(S): at 17:28

## 2022-03-28 RX ADMIN — Medication 100 MILLIEQUIVALENT(S): at 00:22

## 2022-03-28 RX ADMIN — PIPERACILLIN AND TAZOBACTAM 25 GRAM(S): 4; .5 INJECTION, POWDER, LYOPHILIZED, FOR SOLUTION INTRAVENOUS at 07:59

## 2022-03-28 RX ADMIN — Medication 2.5 MILLIGRAM(S): at 07:05

## 2022-03-28 RX ADMIN — Medication 50 MILLIGRAM(S): at 05:23

## 2022-03-28 RX ADMIN — SODIUM CHLORIDE 4 MILLILITER(S): 9 INJECTION INTRAMUSCULAR; INTRAVENOUS; SUBCUTANEOUS at 18:07

## 2022-03-28 RX ADMIN — LEVETIRACETAM 400 MILLIGRAM(S): 250 TABLET, FILM COATED ORAL at 10:54

## 2022-03-28 RX ADMIN — Medication 50 MILLIGRAM(S): at 18:33

## 2022-03-28 RX ADMIN — LEVETIRACETAM 400 MILLIGRAM(S): 250 TABLET, FILM COATED ORAL at 22:09

## 2022-03-28 RX ADMIN — LACOSAMIDE 120 MILLIGRAM(S): 50 TABLET ORAL at 17:25

## 2022-03-28 RX ADMIN — DONEPEZIL HYDROCHLORIDE 10 MILLIGRAM(S): 10 TABLET, FILM COATED ORAL at 00:12

## 2022-03-28 RX ADMIN — Medication 2.5 MILLIGRAM(S): at 11:56

## 2022-03-28 RX ADMIN — SODIUM CHLORIDE 75 MILLILITER(S): 9 INJECTION, SOLUTION INTRAVENOUS at 00:51

## 2022-03-28 RX ADMIN — SODIUM CHLORIDE 4 MILLILITER(S): 9 INJECTION INTRAMUSCULAR; INTRAVENOUS; SUBCUTANEOUS at 05:23

## 2022-03-28 RX ADMIN — Medication 50 MILLIGRAM(S): at 10:54

## 2022-03-28 RX ADMIN — PIPERACILLIN AND TAZOBACTAM 25 GRAM(S): 4; .5 INJECTION, POWDER, LYOPHILIZED, FOR SOLUTION INTRAVENOUS at 22:41

## 2022-03-28 RX ADMIN — PIPERACILLIN AND TAZOBACTAM 25 GRAM(S): 4; .5 INJECTION, POWDER, LYOPHILIZED, FOR SOLUTION INTRAVENOUS at 00:13

## 2022-03-28 RX ADMIN — ENOXAPARIN SODIUM 90 MILLIGRAM(S): 100 INJECTION SUBCUTANEOUS at 05:21

## 2022-03-28 RX ADMIN — LEVETIRACETAM 400 MILLIGRAM(S): 250 TABLET, FILM COATED ORAL at 14:00

## 2022-03-28 RX ADMIN — Medication 1 MILLIGRAM(S): at 11:10

## 2022-03-28 RX ADMIN — Medication 100 MILLIEQUIVALENT(S): at 01:24

## 2022-03-28 RX ADMIN — PIPERACILLIN AND TAZOBACTAM 25 GRAM(S): 4; .5 INJECTION, POWDER, LYOPHILIZED, FOR SOLUTION INTRAVENOUS at 14:00

## 2022-03-28 RX ADMIN — ENOXAPARIN SODIUM 90 MILLIGRAM(S): 100 INJECTION SUBCUTANEOUS at 17:26

## 2022-03-28 RX ADMIN — Medication 0.5 MILLIGRAM(S): at 19:11

## 2022-03-28 RX ADMIN — SIMVASTATIN 10 MILLIGRAM(S): 20 TABLET, FILM COATED ORAL at 00:13

## 2022-03-28 RX ADMIN — LEVETIRACETAM 400 MILLIGRAM(S): 250 TABLET, FILM COATED ORAL at 00:51

## 2022-03-28 RX ADMIN — LACOSAMIDE 200 MILLIGRAM(S): 50 TABLET ORAL at 12:20

## 2022-03-28 RX ADMIN — LISINOPRIL 5 MILLIGRAM(S): 2.5 TABLET ORAL at 05:22

## 2022-03-28 NOTE — PROGRESS NOTE ADULT - PROBLEM SELECTOR PLAN 3
C/w home meds  - Wife denies any seizure like activity at home. Pt at baseline is incontinent (both urine and BM)   - Seizure precautions

## 2022-03-28 NOTE — CONSULT NOTE ADULT - ASSESSMENT
71 y/o M w/ pmh of meningioma (s/p resection 2003), HTN, HLD, advanced dementia (baseline bedbound, A&O1-2, referred by PMD to hospice), and seizure disorder (last seizure 09/2021 admitted to EMU) BIBEMS for increased secretions and tachycardia to 140s, found to be in a.fib RVR, with leukocytosis and tachycardia, meeting sepsis criteria possibly 2/2 aspiration pna. Hospital course c/b breakthrough seizures. Palliative consulted for assistance with goals of care in patient with advanced illness.

## 2022-03-28 NOTE — CONSULT NOTE ADULT - SUBJECTIVE AND OBJECTIVE BOX
HPI:  72M w/ pmh of meningioma (s/p resection 2003), HTN, HLD, advanced dementia (baseline bedbound, A&O1-2, referred by PMD for hospice), and seizure disorder (last seizure 09/2021 admitted to EMU) BIBEMS for increased secretions and tachycardia to 140s. Pt is A&O 2 at time of interview, however lacks insight to medical condition, hx provided by wife at bedside.     Wife states that pt has had increased oral secretions along with decreased level of responsiveness in the last week. He is noted to choke and cough more as he tries to spit out mucus. He is incontinent at baseline, denies dysuria, fevers (Tmax at home 99.7), nausea, vomiting. Wife reports few loose bowel movements non bloody. Wife is sole care taker and reports increasing difficulty providing care for patient has he has been entirely bed bound for the last 3 months, without any equipment at home, wife has had to frequently reposition him to prevent aspiration of oral secretions. Wife had a family meeting on Friday 3/25 with [facility] Dr. Elena and was referred to hospice. Per wife, hospice was due to evaluate pt either today or tomorrow.     In ED, pt tachycardic to 168, tele strip and ECG indicated a.fib with RVR. Pt was given amio 150mg x1 with improvement in HR. Pt then had recurrent episodes, dilt 20mg x 2 given. BP 150s/70s, SpO2 99%, placed on 2L oxygen given RVR. Cardiology consulted and pt at the time already converted back to sinus. Pt additionally received zosyn, LR and NS 1L bolus each. KCl 10mEq x 1, Mg 1g.  (27 Mar 2022 19:38)    PERTINENT PM/SXH:   Cavernoma    Essential hypertension    Other hyperlipidemia    Partial symptomatic epilepsy with complex partial seizures, not intractable, without status epilepticus    Status post craniectomy      FAMILY HISTORY:  Unable to obtain due to poor mentation      ITEMS NOT CHECKED ARE NOT PRESENT    SOCIAL HISTORY:   Significant other/partner[x]  Children[x]  Lutheran/Spirituality:  Substance hx:  [ ]   Tobacco hx:  [ ]   Alcohol hx: [ ]   Home Opioid hx:  [ ] I-Stop Reference No:  Living Situation: [x]Home  [ ]Long term care  [ ]Rehab [ ]Other    ADVANCE DIRECTIVES:    DNR/MOLST  [ ]  Living Will  [ ]   DECISION MAKER(s):  [ ] Health Care Proxy(s)  [x] Surrogate(s)  [ ] Guardian           Name(s): Phone Number(s):  Jeannie (spouse) [phone]    BASELINE (I)ADL(s) (prior to admission):  [place]: [ ]Total  [ ] Moderate [x]Dependent    Allergies    No Known Allergies    Intolerances    MEDICATIONS  (STANDING):  aspirin enteric coated 81 milliGRAM(s) Oral daily  donepezil 10 milliGRAM(s) Oral at bedtime  enoxaparin Injectable 90 milliGRAM(s) SubCutaneous every 12 hours  finasteride 5 milliGRAM(s) Oral daily  lacosamide IVPB 100 milliGRAM(s) IV Intermittent every 12 hours  lactated ringers. 1000 milliLiter(s) (75 mL/Hr) IV Continuous <Continuous>  levETIRAcetam  IVPB 1000 milliGRAM(s) IV Intermittent <User Schedule>  lisinopril 5 milliGRAM(s) Oral daily  metoprolol tartrate Injectable 2.5 milliGRAM(s) IV Push every 6 hours  piperacillin/tazobactam IVPB.. 3.375 Gram(s) IV Intermittent every 8 hours  sertraline 50 milliGRAM(s) Oral daily  simvastatin 10 milliGRAM(s) Oral at bedtime  sodium chloride 3%  Inhalation 4 milliLiter(s) Inhalation every 12 hours  valproate sodium IVPB 750 milliGRAM(s) IV Intermittent every 8 hours    MEDICATIONS  (PRN):    PRESENT SYMPTOMS: [x]Unable to self-report  [ ] CPOT [x] PAINADs [ ] RDOS  Source if other than patient:  [ ]Family   [x]Team     Pain: [ ]yes [x]no see PAINAD  QOL impact -   Location -                    Aggravating factors -  Quality -  Radiation -  Timing-  Severity (0-10 scale):  Minimal acceptable level (0-10 scale):     CPOT:    https://www.sccm.org/getattachment/dng59q89-7p0e-0q8a-0g6p-4158g3744v4l/Critical-Care-Pain-Observation-Tool-(CPOT)    PAIN AD Score: 0  http://geriatrictoolkit.[place].[email or web address]/painad.pdf (press ctrl +  left click to view)    Dyspnea:                           [ ]Mild [ ]Moderate [ ]Severe      RDOS: 0  0 to 2  minimal or no respiratory distress   3  mild distress  4 to 6 moderate distress  >7 severe distress  https://homecareinformation.net/handouts/hen/Respiratory_Distress_Observation_Scale.pdf (Ctrl +  left click to view)     Anxiety:                             [ ]Mild [ ]Moderate [ ]Severe  Fatigue:                             [ ]Mild [ ]Moderate [ ]Severe  Nausea:                             [ ]Mild [ ]Moderate [ ]Severe  Loss of appetite:              [ ]Mild [ ]Moderate [ ]Severe  Constipation:                    [ ]Mild [ ]Moderate [ ]Severe    Other Symptoms:  [x]All other review of systems unable to obtain due to poor mentation    Palliative Performance Status Version 2:    10     %    http://npcrc.org/files/news/palliative_performance_scale_ppsv2.pdf  PHYSICAL EXAM:  Vital Signs Last 24 Hrs  T(C): 36.7 (28 Mar 2022 12:22), Max: 36.7 (28 Mar 2022 12:22)  T(F): 98.1 (28 Mar 2022 12:22), Max: 98.1 (28 Mar 2022 12:22)  HR: 70 (28 Mar 2022 12:22) (50 - 70)  BP: 135/87 (28 Mar 2022 12:22) (135/87 - 151/83)  BP(mean): --  RR: 18 (28 Mar 2022 12:22) (18 - 18)  SpO2: 97% (28 Mar 2022 12:22) (96% - 99%) I&O's Summary    28 Mar 2022 07:01  -  28 Mar 2022 16:05  --------------------------------------------------------  IN: 0 mL / OUT: 0 mL / NET: 0 mL      GENERAL:  [ ]Alert  [ ]Oriented  [x]Lethargic  [ ]Cachexia  [ ]Unarousable  [ ]Verbal  [x]Non-Verbal  Behavioral:   [ ]Anxiety  [ ]Delirium [ ]Agitation [ ]Other  HEENT:  [x]Normal   [ ]Dry mouth   [ ]ET Tube/Trach  [ ]Oral lesions  PULMONARY:   [ ]Clear [ ]Tachypnea  [x]Audible excessive secretions   [ ]Rhonchi        [ ]Right [ ]Left [ ]Bilateral  [ ]Crackles        [ ]Right [ ]Left [ ]Bilateral  [ ]Wheezing     [ ]Right [ ]Left [ ]Bilateral  [ ]Diminished BS [ ] Right [ ]Left [ ]Bilateral  CARDIOVASCULAR:    [x]Regular [ ]Irregular [ ]Tachy  [ ]Leo [ ]Murmur [ ]Other  GASTROINTESTINAL:  [x]Soft  [ ]Distended   [x]+BS  [x]Non tender [ ]Tender  [ ]PEG [ ]OGT/ NGT   Last BM:    GENITOURINARY:  [x]Normal [ ]Incontinent   [ ]Oliguria/Anuria   [ ]Sullivan  MUSCULOSKELETAL:   [ ]Normal   [ ]Weakness  [x]Bed/Wheelchair bound [ ]Edema  NEUROLOGIC:   [ ]No focal deficits  [x] Cognitive impairment  [ ] Dysphagia [ ]Dysarthria [ ] Paresis [ ]Other   SKIN:   [x]Normal  [ ]Rash   [ ]Pressure ulcer(s) [ ]y [ ]n present on admission    CRITICAL CARE:  [ ] Shock Present  [ ]Septic [ ]Cardiogenic [ ]Neurologic [ ]Hypovolemic  [ ]  Vasopressors [ ]  Inotropes   [ ]Respiratory failure present [ ]Mechanical ventilation [ ]Non-invasive ventilatory support [ ]High flow    [ ]Acute  [ ]Chronic [ ]Hypoxic  [ ]Hypercarbic [ ]Other  [ ]Other organ failure     LABS:                        13.7   7.23  )-----------( 91       ( 28 Mar 2022 07:25 )             39.7   03-28    144  |  100  |  18  ----------------------------<  84  4.0   |  27  |  0.94    Ca    9.1      28 Mar 2022 07:10  Phos  2.7     03-28  Mg     2.0     03-28    TPro  5.9<L>  /  Alb  3.3  /  TBili  0.9  /  DBili  x   /  AST  15  /  ALT  7<L>  /  AlkPhos  70  03-28        RADIOLOGY & ADDITIONAL STUDIES:  < from: CT Angio Chest PE Protocol w/ IV Cont (03.27.22 @ 15:33) >    ACC: [id number] EXAM:  CT ANGIO CHEST PULM ART [facility]                          PROCEDURE DATE:  03/27/2022          INTERPRETATION:  CLINICAL INFORMATION: Tachycardia. Evaluate for   pulmonary embolus.    COMPARISON: Radiograph chest 3/27/2022.    CONTRAST/COMPLICATIONS:  IV Contrast: Omnipaque 350  75 cc administered   25 cc discarded  Oral Contrast: NONE  Complications: None reported at time of study completion    PROCEDURE:  CT Angiography of the Chest.  Sagittal and coronal reformats were performed as well as 3D (MIP)   reconstructions.    FINDINGS:    LUNGS AND AIRWAYS: Patent central airways.  Bilateral lower lobe passive   atelectasis.    PLEURA: Small bilateral pleural effusions. No pneumothorax.    MEDIASTINUM AND EDMUND: No lymphadenopathy. The esophagus is unremarkable.   The thyroid gland is unremarkable.    VESSELS: Left-sided arch and left-sided descending thoracic aorta. Aorta   is tortuous and ectatic. Atheromatous disease of the aorta..    No main, central, lobar, segmental,or subsegmental pulmonary embolus.    HEART: Heart size is normal. No pericardial effusion. Coronary artery   calcifications.    CHEST WALL AND LOWER NECK: Within normal limits.    VISUALIZED UPPER ABDOMEN: The left adrenal gland is thickened. Withinthe   lateral limb and body of the left adrenal gland is a 2.1 x 1.1 cm nodule.    BONES: Degenerative changes of the spine. Lipoma of the right serratus   musculature measures 1.9 x 5.3 cm.      IMPRESSION:  1.  No pulmonary embolus.  2.  Small bilateral pleural effusions and lower lobe passive atelectasis.    --- End of Report ---          NESSA BULLOCK MD; Resident Radiologist  This document has been electronically signed.  KAYLEE BARRAZA MD; Attending Radiologist  This document has been electronically signed. Mar 27 2022  4:46PM    < end of copied text >      PROTEIN CALORIE MALNUTRITION PRESENT: [ ]mild [ ]moderate [ ]severe [ ]underweight [ ]morbid obesity  https://www.andeal.org/vault/2440/web/files/ONC/Table_Clinical%20Characteristics%20to%20Document%20Malnutrition-White%20JV%20et%20al%202012.pdf    Height (cm): 188 (03-27-22 @ 11:05), 188 (09-25-21 @ 11:38)  Weight (kg): 90.7 (03-27-22 @ 11:05)  BMI (kg/m2): 25.7 (03-27-22 @ 11:05)    [ ]PPSV2 < or = to 30% [ ]significant weight loss  [ ]poor nutritional intake  [ ]anasarca[ ]Artificial Nutrition      REFERRALS:   [ ]Chaplaincy  [ ]Hospice  [ ]Child Life  [ ]Social Work  [ ]Case management [ ]Holistic Therapy     Goals of Care Document:

## 2022-03-28 NOTE — PROGRESS NOTE ADULT - PROBLEM SELECTOR PLAN 5
Wife expressed interest in hospice. As per discussion with outpt PMD, pt's prognosis given advanced dementia is poor and that at this time, wife and pt prefer going home with home hospice. Wife also states pt would NOT want any artificial life sustaining measures and would prefer a natural death.     DNR DNI NO feeding tube. OK with abx and lab draws.   ANA signed, in chart.    Hospice referral placed. WIll reach out to CM in am.

## 2022-03-28 NOTE — PROGRESS NOTE ADULT - ATTENDING COMMENTS
above plans discussed with Dr. Amor    # sepsis 2/2 aspiration PNA  # seizure disorder  # new onset afib with rvr  # advanced dementia  # advanced care planning    - pt admitted with sepsis 2/2 aspiration PNA, s/p IVF and IV zosyn  - this morning noted to have generalized tremors, s/p keppra and depakote with mild improvement but noted to have another episodes shortly after; care discussed with neurology team - in agreement with IV ativan to break the seizure as it is provoked in setting of sepsis  - appreciate neuro recs on AEDs: start vimpat and continue keppra, depakote  - pt fluctuates between afib and sinus rhythm on tele; [id number] at least 2; pt's wife confirmed that she wanted him to be on systemic AC  - pt was referred to home hospice but hasn't been fully evaluated at home yet; consult hospice team here  - wife understands that pt is declining and unlikely recover from his illness; confirmed DNR/DNI code status    Corie Hogan MD  Division of Hospital Medicine  Contact via [employer] Teams  Office: [phone]

## 2022-03-28 NOTE — PROGRESS NOTE ADULT - PROBLEM SELECTOR PLAN 2
New onset a.fib with RVR; likely 2/2 sepsis   - CHADSVASc: 2 ; HASBLED: 3    - Cardiology following, appreciate recs  - Will f/u TTE  - ECG without ST elevation, trops non ischemic   - Check TSH   - Lovenox 1mg/kg BID - discussed risk and benefits, wife would like to start AC at this time. (Will also clarify with neuro in am if any issues with AC given h/o seizures and meningioma s/p resection 2003 )  - Metoprolol 5mg q6 for now until pt able to tolerate PO

## 2022-03-28 NOTE — CONSULT NOTE ADULT - PROBLEM SELECTOR RECOMMENDATION 6
see GOC note above  DNR/DNI, no feeding tube- MOLST in chart  continue abx and blood thinners for now, will add PRNs for symptom management

## 2022-03-28 NOTE — PROGRESS NOTE ADULT - ASSESSMENT
72M w/ pmh of meningioma (s/p resection 2003), HTN, HLD, advanced dementia (baseline bedbound, A&O1-2, referred by PMD for hospice), and seizure disorder (last seizure 09/2021 admitted to EMU) BIBEMS for increased secretions and tachycardia to 140s, found to be in a.fib RVR, with leukocytosis and tachycardia, meeting sepsis criteria possibly 2/2 aspiration pna.

## 2022-03-28 NOTE — PROGRESS NOTE ADULT - SUBJECTIVE AND OBJECTIVE BOX
Patient is a 72y old  Male who presents with a chief complaint of tachycardia, increased secretions (27 Mar 2022 19:38)      SUBJECTIVE / OVERNIGHT EVENTS:  patient was afib overnight but sinus daniele down to 50s, this am afib to 130s   patient does not know why he is in the hospital but denies fever, pain, chest pain, headaches       MEDICATIONS  (STANDING):  aspirin enteric coated 81 milliGRAM(s) Oral daily  donepezil 10 milliGRAM(s) Oral at bedtime  enoxaparin Injectable 90 milliGRAM(s) SubCutaneous every 12 hours  finasteride 5 milliGRAM(s) Oral daily  lactated ringers. 1000 milliLiter(s) (75 mL/Hr) IV Continuous <Continuous>  levETIRAcetam  IVPB 1500 milliGRAM(s) IV Intermittent every 12 hours  lisinopril 5 milliGRAM(s) Oral daily  metoprolol tartrate Injectable 2.5 milliGRAM(s) IV Push every 6 hours  piperacillin/tazobactam IVPB.. 3.375 Gram(s) IV Intermittent every 8 hours  sertraline 50 milliGRAM(s) Oral daily  simvastatin 10 milliGRAM(s) Oral at bedtime  sodium chloride 3%  Inhalation 4 milliLiter(s) Inhalation every 12 hours  valproate sodium IVPB 750 milliGRAM(s) IV Intermittent every 6 hours    MEDICATIONS  (PRN):      CAPILLARY BLOOD GLUCOSE      POCT Blood Glucose.: 123 mg/dL (27 Mar 2022 11:24)    I&O's Summary      PHYSICAL EXAM:  Vital Signs Last 24 Hrs  T(C): 36.4 (03-28-22 @ 04:16)  T(F): 97.5 (03-28-22 @ 04:16), Max: 98.9 (03-27-22 @ 12:05)  HR: 50 (03-28-22 @ 04:16) (50 - 142)  BP: 151/83 (03-28-22 @ 04:16)  BP(mean): --  RR: 18 (03-28-22 @ 04:16) (18 - 20)  SpO2: 99% (03-28-22 @ 04:16) (96% - 100%)  Wt(kg): --    Constitutional: NAD, comfortable laying up in bed   Eyes: no conjunctival erythema  ENT: MMM  Neck: Neck supple, No JVD  Respiratory: crackles diffusely, no wheezing   CV: Tachycardic, no murmurs  Abdominal: Soft, NT, ND +BS  MSK: moving all 4 extremities spontaneously   Extremities: No edema, 2+ peripheral pulses  Neurology: A&Ox2, nonfocal  Skin: No Rashes, Hematoma, Ecchymosis  Psych: Calm and appropriate    Personally reviewed imaging, labs, EKG  LABS:                        13.7   7.23  )-----------( x        ( 28 Mar 2022 07:25 )             39.7     03-28    144  |  100  |  18  ----------------------------<  84  4.0   |  27  |  0.94    Ca    9.1      28 Mar 2022 07:10  Phos  2.7     03-28  Mg     2.0     03-28    TPro  5.9<L>  /  Alb  3.3  /  TBili  0.9  /  DBili  x   /  AST  15  /  ALT  7<L>  /  AlkPhos  70  03-28      CARDIAC MARKERS ( 27 Mar 2022 15:01 )  x     / x     / 98 U/L / x     / 2.7 ng/mL

## 2022-03-28 NOTE — CONSULT NOTE ADULT - CONVERSATION DETAILS
Patient unable to participate in goals of care discussion due to poor mentation. Met with patient's wife Jeannie for [facility] discussion. Jeannie has good understanding of patient's current medical condition and has been his caregiver for last several years. She shared that a hospice referral was pending prior to admission as patient has significantly declined over the last few months.  She shared that patient used to work in [employer] and they have been  for 38 years. They have two adult children who are supportive. Jeannie appropriately emotional and stated she does not want patient to suffer. She stated he has had seizures since his meningioma resection and is worried about him having further seizures like earlier today. We discussed option to transition to symptom directed care with focus being patient's comfort. At this time Jeannie would like to continue abx and monitor overnight for improvement in mental status as pt is also in post ictal state. Jeannie agreeable in adding PRN medications for patient's comfort. She understands if pt if unable to improve to point of taking PO medications for seizures then he will likely require inpatient level of care either in PCU or inpatient hospice. MOLST completed by primary team, DNR/DNI. All questions answered and emotional support provided.

## 2022-03-28 NOTE — CONSULT NOTE ADULT - PROBLEM SELECTOR RECOMMENDATION 7
Will continue to follow. Case discussed with primary team resident.    For symptom management recommend the following:    For pain: IV dilaudid 0.2mg q2h PRN   For dyspnea: IV dilaudid 0.2mg q2h PRN  For secretions: IV robinul 0.4mg q6h PRN  For anxiety: IV ativan 0.5 mg q2h prn   For nausea/vomiting: IV zofran 4mg q8h PRN

## 2022-03-28 NOTE — PATIENT PROFILE ADULT - FALL HARM RISK - HARM RISK INTERVENTIONS

## 2022-03-28 NOTE — PHYSICAL THERAPY INITIAL EVALUATION ADULT - PERTINENT HX OF CURRENT PROBLEM, REHAB EVAL
Pt is a 71 y/o M w/ PMH of meningioma (s/p resection 2003), HTN, HLD, advanced dementia, and seizure disorder (last 09/2021), BIBEMS for incr'd secretions +tachy to 140s. In ED, pt tachy to 168, tele strip and ECG indicated a.fib with RVR. BP 150s/70s, SpO2 99%, placed on 2L O2 given RVR. Pt +sepsis criteria possibly 2/2 aspiration PNA.

## 2022-03-28 NOTE — CONSULT NOTE ADULT - SUBJECTIVE AND OBJECTIVE BOX
HPI:  72M w/ pmh of meningioma (s/p resection 2003), HTN, HLD, advanced dementia (baseline bedbound, A&O1-2, referred by PMD for hospice), and seizure disorder (last seizure 09/2021 admitted to EMU) BIBEMS for increased secretions and tachycardia to 140s. Pt is A&O 2 at time of interview, however lacks insight to medical condition, hx provided by wife at bedside. Wife states that pt has had increased oral secretions along with decreased level of responsiveness in the last week. He is noted to choke and cough more as he tries to spit out mucus. He is incontinent at baseline, denies dysuria, fevers (Tmax at home 99.7), nausea, vomiting. Wife reports few loose bowel movements non bloody. Wife is sole care taker and reports increasing difficulty providing care for patient has he has been entirely bed bound for the last 3 months, without any equipment at home, wife has had to frequently reposition him to prevent aspiration of oral secretions. Wife had a family meeting on Friday 3/25 with [facility] Dr. Elena and was referred to hospice. Per wife, hospice was due to evaluate pt either today or tomorrow.   In ED, pt tachycardic to 168, tele strip and ECG indicated a.fib with RVR. Pt was given amio 150mg x1 with improvement in HR. Pt then had recurrent episodes, dilt 20mg x 2 given. BP 150s/70s, SpO2 99%, placed on 2L oxygen given RVR. Cardiology consulted and pt at the time already converted back to sinus. Pt additionally received zosyn, LR and NS 1L bolus each. KCl 10mEq x 1, Mg 1g.  (27 Mar 2022 19:38)    Neurology was consulted for a seizure episode started at 1050 in the morning. Pt has right head turning with gaze preference with right facial twitching. Bilateral arm twitching with right arm more than the left and right left twitching. Ativan 1 mg was given at 1110. According to patient's wife, who was by the bedside, patient's last seizure episode was 2 years ago. Patient's mental status was AOX2 on baseline and has been following up with Dr. Hoover outpatient setting, last appointment was 3/18. He has been compliant with his regimen. Current home AED are Keppra 1000mg BID, Valproate acid 1000mg AM and 1500mg PM, Clonazepam 0.25 mg sublingual PRN.     A 10-system ROS was performed and is negative except for those items noted above and/or in the HPI.    PAST MEDICAL & SURGICAL HISTORY:  Cavernoma  S/p resection 2004    Essential hypertension    Other hyperlipidemia    Partial symptomatic epilepsy with complex partial seizures, not intractable, without status epilepticus  Since 2004 s/p L. temporal cavernoma resection    Status post craniectomy  2004      FAMILY HISTORY:    SOCIAL HISTORY:   T/E/D: No smoke, drink, drugs     Vital Signs:  T(C): 36.7 (03-28-22 @ 12:22), Max: 36.7 (03-28-22 @ 12:22)  T(F): 98.1 (03-28-22 @ 12:22), Max: 98.1 (03-28-22 @ 12:22)  HR: 70 (03-28-22 @ 12:22) (50 - 112)  BP: 135/87 (03-28-22 @ 12:22) (115/76 - 151/83)  RR: 18 (03-28-22 @ 12:22) (18 - 20)  SpO2: 97% (03-28-22 @ 12:22) (96% - 100%)      MEDICATIONS (HOME):  Home Medications:  aspirin 81 mg oral tablet: 1 tab(s) orally once a day (27 Mar 2022 21:40)  divalproex sodium 500 mg oral delayed release tablet: 2 tab(s) orally once a day (27 Mar 2022 21:40)  divalproex sodium 500 mg oral delayed release tablet: 3 tab(s) orally once a day (at bedtime) (27 Mar 2022 21:40)  donepezil 10 mg oral tablet: 1 tab(s) orally once a day (at bedtime) (27 Mar 2022 21:40)  finasteride 5 mg oral tablet: 1 tab(s) orally once a day (27 Mar 2022 21:40)  Keppra 750 mg oral tablet: 2 tab(s) orally once a day (at bedtime) (27 Mar 2022 21:40)  levETIRAcetam 750 mg oral tablet: 2 tab(s) orally once a day (27 Mar 2022 21:40)  lisinopril 5 mg oral tablet: 1 tab(s) orally once a day (27 Mar 2022 21:40)  simvastatin 10 mg oral tablet: 1 tab(s) orally once a day (at bedtime) (27 Mar 2022 21:40)  Zoloft 50 mg oral tablet: 1 tab(s) orally once a day (27 Mar 2022 21:40)      Exam:   MS:  eyes open to verbal stimuli  CN: VFF. SHIRA. EOMI. V1-V3 intact. right eye gaze preference, Left facial droop, right facial twitching.   Motor: B/L LE twitches, right LE twitch, LE withdraw to stimuli  Sensation: intact to PP throughout  Coordination: unable to assess due to pt actively having seizure activity  Gait: Deferred due to pt actively having seizure activity      LABS:  03-27 @ 15:01 Creatine 98 U/L [30 - 200]  cret                        13.7   7.23  )-----------( 91       ( 28 Mar 2022 07:25 )             39.7     03-28    144  |  100  |  18  ----------------------------<  84  4.0   |  27  |  0.94    Ca    9.1      28 Mar 2022 07:10  Phos  2.7     03-28  Mg     2.0     03-28    TPro  5.9<L>  /  Alb  3.3  /  TBili  0.9  /  DBili  x   /  AST  15  /  ALT  7<L>  /  AlkPhos  70  03-28    Imagings:     CT Angio Chest PE Protocol w/ IV Cont (03.27.22 )   IMPRESSION:  1.  No pulmonary embolus.  2.  Small bilateral pleural effusions and lower lobe passive atelectasis.      : POCUS ED TTE 2D F/U, Limited w/o Cont. (03.27.22 )   IMPRESSION:  No Pericardial Effusion.  IVC flat                   HPI:  72M w/ pmh of meningioma (s/p resection 2003), HTN, HLD, advanced dementia (baseline bedbound, A&O1-2, referred by PMD for hospice), and seizure disorder (last seizure 09/2021 admitted to EMU) BIBEMS for increased secretions and tachycardia to 140s. Pt is A&O 2 at time of interview, however lacks insight to medical condition, hx provided by wife at bedside. Wife states that pt has had increased oral secretions along with decreased level of responsiveness in the last week. He is noted to choke and cough more as he tries to spit out mucus. He is incontinent at baseline, denies dysuria, fevers (Tmax at home 99.7), nausea, vomiting. Wife reports few loose bowel movements non bloody. Wife is sole care taker and reports increasing difficulty providing care for patient has he has been entirely bed bound for the last 3 months, without any equipment at home, wife has had to frequently reposition him to prevent aspiration of oral secretions. Wife had a family meeting on Friday 3/25 with [facility] Dr. Elena and was referred to hospice. Per wife, hospice was due to evaluate pt either today or tomorrow.   In ED, pt tachycardic to 168, tele strip and ECG indicated a.fib with RVR. Pt was given amio 150mg x1 with improvement in HR. Pt then had recurrent episodes, dilt 20mg x 2 given. BP 150s/70s, SpO2 99%, placed on 2L oxygen given RVR. Cardiology consulted and pt at the time already converted back to sinus. Pt additionally received zosyn, LR and NS 1L bolus each. KCl 10mEq x 1, Mg 1g.  (27 Mar 2022 19:38)    Neurology was consulted for a seizure episode started at 1050 in the morning. Pt has right head turning with gaze preference with right facial twitching. Bilateral arm twitching with right arm more than the left and right left twitching. Ativan 1 mg was given at 1110. According to patient's wife, who was by the bedside, patient's last seizure episode was 2 years ago. Patient's mental status was AOX2 on baseline and has been following up with Dr. Hoover outpatient setting, last appointment was 3/18. He has been compliant with his regimen. Current home AED are Keppra 1000mg BID, Valproate acid 1000mg AM and 1500mg PM, Clonazepam 0.25 mg sublingual PRN.     A 10-system ROS was performed and is negative except for those items noted above and/or in the HPI.    PAST MEDICAL & SURGICAL HISTORY:  Cavernoma  S/p resection 2004    Essential hypertension    Other hyperlipidemia    Partial symptomatic epilepsy with complex partial seizures, not intractable, without status epilepticus  Since 2004 s/p L. temporal cavernoma resection    Status post craniectomy  2004      FAMILY HISTORY:    SOCIAL HISTORY:   T/E/D: No smoke, drink, drugs     Vital Signs:  T(C): 36.7 (03-28-22 @ 12:22), Max: 36.7 (03-28-22 @ 12:22)  T(F): 98.1 (03-28-22 @ 12:22), Max: 98.1 (03-28-22 @ 12:22)  HR: 70 (03-28-22 @ 12:22) (50 - 112)  BP: 135/87 (03-28-22 @ 12:22) (115/76 - 151/83)  RR: 18 (03-28-22 @ 12:22) (18 - 20)  SpO2: 97% (03-28-22 @ 12:22) (96% - 100%)      MEDICATIONS (HOME):  Home Medications:  aspirin 81 mg oral tablet: 1 tab(s) orally once a day (27 Mar 2022 21:40)  divalproex sodium 500 mg oral delayed release tablet: 2 tab(s) orally once a day (27 Mar 2022 21:40)  divalproex sodium 500 mg oral delayed release tablet: 3 tab(s) orally once a day (at bedtime) (27 Mar 2022 21:40)  donepezil 10 mg oral tablet: 1 tab(s) orally once a day (at bedtime) (27 Mar 2022 21:40)  finasteride 5 mg oral tablet: 1 tab(s) orally once a day (27 Mar 2022 21:40)  Keppra 750 mg oral tablet: 2 tab(s) orally once a day (at bedtime) (27 Mar 2022 21:40)  levETIRAcetam 750 mg oral tablet: 2 tab(s) orally once a day (27 Mar 2022 21:40)  lisinopril 5 mg oral tablet: 1 tab(s) orally once a day (27 Mar 2022 21:40)  simvastatin 10 mg oral tablet: 1 tab(s) orally once a day (at bedtime) (27 Mar 2022 21:40)  Zoloft 50 mg oral tablet: 1 tab(s) orally once a day (27 Mar 2022 21:40)      Exam:   MS:  eyes open to verbal stimuli  CN: VFF. SHIRA. EOMI. V1-V3 intact. right eye gaze preference, right facial twitching.   Motor: B/L UE twitches, right LE twitch, LE withdraw to stimuli  Sensation: intact to PP throughout  Coordination: unable to assess due to pt actively having seizure activity  Gait: Deferred due to pt actively having seizure activity      LABS:  03-27 @ 15:01 Creatine 98 U/L [30 - 200]  cret                        13.7   7.23  )-----------( 91       ( 28 Mar 2022 07:25 )             39.7     03-28    144  |  100  |  18  ----------------------------<  84  4.0   |  27  |  0.94    Ca    9.1      28 Mar 2022 07:10  Phos  2.7     03-28  Mg     2.0     03-28    TPro  5.9<L>  /  Alb  3.3  /  TBili  0.9  /  DBili  x   /  AST  15  /  ALT  7<L>  /  AlkPhos  70  03-28    Imagings:     CT Angio Chest PE Protocol w/ IV Cont (03.27.22 )   IMPRESSION:  1.  No pulmonary embolus.  2.  Small bilateral pleural effusions and lower lobe passive atelectasis.      : POCUS ED TTE 2D F/U, Limited w/o Cont. (03.27.22 )   IMPRESSION:  No Pericardial Effusion.  IVC flat                   HPI:  72M w/ pmh of meningioma (s/p resection 2003), HTN, HLD, advanced dementia (baseline bedbound, A&O1-2, referred by PMD for hospice), and seizure disorder (last seizure 09/2021 admitted to EMU) BIBEMS for increased secretions and tachycardia to 140s. Pt is A&O 2 at time of interview, however lacks insight to medical condition, hx provided by wife at bedside. Wife states that pt has had increased oral secretions along with decreased level of responsiveness in the last week. He is noted to choke and cough more as he tries to spit out mucus. He is incontinent at baseline, denies dysuria, fevers (Tmax at home 99.7), nausea, vomiting. Wife reports few loose bowel movements non bloody. Wife is sole care taker and reports increasing difficulty providing care for patient has he has been entirely bed bound for the last 3 months, without any equipment at home, wife has had to frequently reposition him to prevent aspiration of oral secretions. Wife had a family meeting on Friday 3/25 with [facility] Dr. Elena and was referred to hospice. Per wife, hospice was due to evaluate pt either today or tomorrow.   In ED, pt tachycardic to 168, tele strip and ECG indicated a.fib with RVR. Pt was given amio 150mg x1 with improvement in HR. Pt then had recurrent episodes, dilt 20mg x 2 given. BP 150s/70s, SpO2 99%, placed on 2L oxygen given RVR. Cardiology consulted and pt at the time already converted back to sinus. Pt additionally received zosyn, LR and NS 1L bolus each. KCl 10mEq x 1, Mg 1g.  (27 Mar 2022 19:38)    Neurology was consulted for a seizure episode started at 1050 in the morning. Pt has right head turning with gaze preference with right facial twitching. Bilateral arm twitching with right arm more than the left and right left twitching. Ativan 1 mg was given at 1110. According to patient's wife, who was by the bedside, patient's last seizure episode was 2 years ago. Patient's mental status was AOX2 on baseline and has been following up with Dr. Hoover outpatient setting, last appointment was 3/18. He has been compliant with his regimen. Current home AED are Keppra 1000mg BID, Valproate acid 1000mg AM and 1500mg PM, Clonazepam 0.25 mg sublingual PRN.     A 10-system ROS was performed and is negative except for those items noted above and/or in the HPI.    PAST MEDICAL & SURGICAL HISTORY:  Cavernoma  S/p resection 2004    Essential hypertension    Other hyperlipidemia    Partial symptomatic epilepsy with complex partial seizures, not intractable, without status epilepticus  Since 2004 s/p L. temporal cavernoma resection    Status post craniectomy  2004      FAMILY HISTORY: Non-contributory    SOCIAL HISTORY:   T/E/D: No smoke, drink, illicit drugs     Vital Signs:  T(C): 36.7 (03-28-22 @ 12:22), Max: 36.7 (03-28-22 @ 12:22)  T(F): 98.1 (03-28-22 @ 12:22), Max: 98.1 (03-28-22 @ 12:22)  HR: 70 (03-28-22 @ 12:22) (50 - 112)  BP: 135/87 (03-28-22 @ 12:22) (115/76 - 151/83)  RR: 18 (03-28-22 @ 12:22) (18 - 20)  SpO2: 97% (03-28-22 @ 12:22) (96% - 100%)      MEDICATIONS (HOME):  Home Medications:  aspirin 81 mg oral tablet: 1 tab(s) orally once a day (27 Mar 2022 21:40)  divalproex sodium 500 mg oral delayed release tablet: 2 tab(s) orally once a day (27 Mar 2022 21:40)  divalproex sodium 500 mg oral delayed release tablet: 3 tab(s) orally once a day (at bedtime) (27 Mar 2022 21:40)  donepezil 10 mg oral tablet: 1 tab(s) orally once a day (at bedtime) (27 Mar 2022 21:40)  finasteride 5 mg oral tablet: 1 tab(s) orally once a day (27 Mar 2022 21:40)  Keppra 750 mg oral tablet: 2 tab(s) orally once a day (at bedtime) (27 Mar 2022 21:40)  levETIRAcetam 750 mg oral tablet: 2 tab(s) orally once a day (27 Mar 2022 21:40)  lisinopril 5 mg oral tablet: 1 tab(s) orally once a day (27 Mar 2022 21:40)  simvastatin 10 mg oral tablet: 1 tab(s) orally once a day (at bedtime) (27 Mar 2022 21:40)  Zoloft 50 mg oral tablet: 1 tab(s) orally once a day (27 Mar 2022 21:40)      Exam:   MS:  eyes open to verbal stimuli  CN: VFF. SHIRA. EOMI. V1-V3 intact. right eye gaze preference, right facial twitching.   Motor: B/L UE twitches, right LE twitch, LE withdraw to stimuli  Sensation: intact to PP throughout  Coordination: unable to assess due to pt actively having seizure activity  Gait: Deferred due to pt actively having seizure activity      LABS:  03-27 @ 15:01 Creatine 98 U/L [30 - 200]  cret                        13.7   7.23  )-----------( 91       ( 28 Mar 2022 07:25 )             39.7     03-28    144  |  100  |  18  ----------------------------<  84  4.0   |  27  |  0.94    Ca    9.1      28 Mar 2022 07:10  Phos  2.7     03-28  Mg     2.0     03-28    TPro  5.9<L>  /  Alb  3.3  /  TBili  0.9  /  DBili  x   /  AST  15  /  ALT  7<L>  /  AlkPhos  70  03-28    Imagings:     CT Angio Chest PE Protocol w/ IV Cont (03.27.22 )   IMPRESSION:  1.  No pulmonary embolus.  2.  Small bilateral pleural effusions and lower lobe passive atelectasis.      : POCUS ED TTE 2D F/U, Limited w/o Cont. (03.27.22 )   IMPRESSION:  No Pericardial Effusion.  IVC flat

## 2022-03-28 NOTE — CONSULT NOTE ADULT - NS ATTEND AMEND GEN_ALL_CORE FT
HPI as per NP note, personally verified by me. Patient with known seizure disorder and last seizure ~2 years ago, currently admitted for worsening oral secretions and likely PNA. Had a seizure today, which stopped with Ativan 1mg and Vimpat load. Last seizure reported before this was 9/2021. Currently enrolled in hospice. No further seizure activity reported.    Gen - NAD  CV- peripheral pulses palpable, no edema  Fundoscopy not well visualized    Neurologic exam:  MS - Lethargic and somnolent, no speech output, does not follow commands. Due to clinical condition unable to assess (HOSEA) orientation, rep/naming, attn/conc, recent and remote memory, fund of knowledge  CN - Pupils with anisocoria L larger than R and likely surgical but both sluggishly reactive, EOMI, VFF to threat b/l, (+) face sens/str by corneals b/l, hearing grossly intact to conversation b/l, SCM at least 4+/5 b/l and symmetric. HOSEA tongue/palate  Motor - Normal bulk and dec tone throughout. BUE's at least 3+/5 and BLE's at least 2+/5 and symmetric  Sens - LT/PP intact as he grimaces, withdraws, and localizes to strong noxious stimuli in each extremity  DTR's - 2+ BUE's, 1+ KJ b/l, 0+ AJ b/l, and downgoing b/l plantar response  Coord - Grossly appropriate for level of strength  Gait and station - Due to fall risk/safety concerns and mental status could not assess    A/P:  Epilepsy, intractable, without status epilepticus  Dementia  Meningioma  HTN  A fib with RVR  PNA    - Likely provoked seizure in setting of multiple medical problems. Resolved with Ativan and Vimpat, no further reported events. Appreciate input of Dr. Hoover  - S/p Vimpat 200mg IV x1 load, maintenance with Vimpat 100mg IV [id number], Keppra 1g IV [id number], VPA 750mg IV [id number]  - Ativan 2mg IV [id number] prn for seizure rescue (lasting > 3 minutes)  - Continue to address above medical issues, as you are doing (as appropriate for hospice)  - Will sign off, please call with additional questions or concerns

## 2022-03-28 NOTE — PHYSICAL THERAPY INITIAL EVALUATION ADULT - PRECAUTIONS/LIMITATIONS, REHAB EVAL
XRAY CHEST (3/27): clear. POCUS ED TTE (3/27): No pericardial effusion. IVC flat. CTA CHEST (3/27): (-) PE. Small B/L pleural effusions and lower lobe passive atelectasis.

## 2022-03-28 NOTE — CONSULT NOTE ADULT - ASSESSMENT
Assessment:   72M w/ pmh of meningioma (s/p resection 2003), HTN, HLD, advanced dementia (baseline bedbound, A&O1-2, referred by PMD for hospice), and seizure disorder (last seizure 09/2021 admitted to EMU) admitted for sepsis secondary to pneumonia, pleural effusion and atrial fibrilation with RVR. Patient is currently on Zosyn for pna and Metoprolol for rate control. Neurology was consulted for a seizure episode started at 1050 in the morning. Pt has right head turning with gaze preference with right facial twitching. Bilateral arm twitching with right arm more than the left and right left twitching. Ativan 1 mg was given at 1110. According to patient's wife, who was by the bedside, patient's last seizure episode was 2 years ago. Patient's mental status was AOX2 on baseline and has been following up with Dr. Hoover outpatient setting, last appointment was 3/18. He has been compliant with his regimen. Wife had a family meeting on Friday 3/25 with [facility] Dr. Elena and was referred to hospice. Per wife, hospice was due to evaluate pt either today or tomorrow. Current home AED are Keppra 1000mg BID, Valproate acid 1000mg AM and 1500mg PM, Clonazepam 0.25 mg sublingual PRN. Patient was still having active seizure on assessment, 12 lead EKG done with MO interval 144ms, Vimpat 200mg IVP was given.    Impression: right facial, B/L LE and right LE twitching with right head turning and right gaze preference likely seizure activity with provoking cause related to infectious etiology ( Pneumonia)    Recommendation:  [] vEEG  [] Vimpat 200mg IVP x1, Vimpat 100mg IV  BID  [] Keppra 1000mg IV Q 8 hr,  Valproate sodium 750mg IV q 8 hrs  [] Rescue meds: 1) ativan 2 mg IVP if seizure lasting more than 3 minutes   2) valproate [id number] mg IVP if seizure refractory to ativan  [] continue neuro check, airway monitoring, seizure precaution protocol     Case discussed with Dr. Hoover       Assessment:   72M w/ pmh of meningioma (s/p resection 2003), HTN, HLD, advanced dementia (baseline bedbound, A&O1-2, referred by PMD for hospice), and seizure disorder (last seizure 09/2021 admitted to EMU) admitted for sepsis secondary to pneumonia, pleural effusion and atrial fibrilation with RVR. Patient is currently on Zosyn for pna and Metoprolol for rate control. Neurology was consulted for a seizure episode started at 1050 in the morning. Pt has right head turning with gaze preference with right facial twitching. Bilateral arm twitching with right arm more than the left and right left twitching. Ativan 1 mg was given at 1110. According to patient's wife, who was by the bedside, patient's last seizure episode was 2 years ago. Patient's mental status was AOX2 on baseline and has been following up with Dr. Hoover outpatient setting, last appointment was 3/18. He has been compliant with his regimen. Wife had a family meeting on Friday 3/25 with [facility] Dr. Elena and was referred to hospice. Per wife, hospice was due to evaluate pt either today or tomorrow. Current home AED are Keppra 1000mg BID, Valproate acid 1000mg AM and 1500mg PM, Clonazepam 0.25 mg sublingual PRN. Patient was still having active seizure on assessment, 12 lead EKG done with OK interval 144ms, Vimpat 200mg IVP was given.    Impression: right facial, B/L LE and right LE twitching with right head turning and right gaze preference likely seizure activity with provoking cause related to infectious etiology ( Pneumonia)    Recommendation:  [] vEEG  [] Vimpat 200mg IVP x1, Vimpat 100mg IV  BID  [] Keppra 1000mg IV Q 8 hr,  Valproate sodium 750mg IV q 8 hrs  [] Rescue meds: 1) ativan 2 mg IVP if seizure lasting more than 3 minutes   2) valproate [id number] mg IVP if seizure refractory to ativan  [] continue neuro check, airway monitoring, seizure precaution protocol     Case discussed with Dr. Hoover and Dr. Atkins

## 2022-03-28 NOTE — CONSULT NOTE ADULT - PROBLEM SELECTOR RECOMMENDATION 4
hx of seizures s/p meningioma resection early 2000's  breakthrough seizures today in setting of sepsis  management per neurology

## 2022-03-29 PROCEDURE — 99233 SBSQ HOSP IP/OBS HIGH 50: CPT

## 2022-03-29 PROCEDURE — 99497 ADVNCD CARE PLAN 30 MIN: CPT

## 2022-03-29 PROCEDURE — 99233 SBSQ HOSP IP/OBS HIGH 50: CPT | Mod: GC

## 2022-03-29 RX ORDER — HYDROMORPHONE HYDROCHLORIDE 2 MG/ML
0.2 INJECTION INTRAMUSCULAR; INTRAVENOUS; SUBCUTANEOUS
Refills: 0 | Status: DISCONTINUED | OUTPATIENT
Start: 2022-03-29 | End: 2022-03-31

## 2022-03-29 RX ORDER — HYDROMORPHONE HYDROCHLORIDE 2 MG/ML
0.2 INJECTION INTRAMUSCULAR; INTRAVENOUS; SUBCUTANEOUS
Refills: 0 | Status: DISCONTINUED | OUTPATIENT
Start: 2022-03-29 | End: 2022-03-29

## 2022-03-29 RX ORDER — ACETAMINOPHEN 500 MG
1000 TABLET ORAL ONCE
Refills: 0 | Status: COMPLETED | OUTPATIENT
Start: 2022-03-29 | End: 2022-03-29

## 2022-03-29 RX ORDER — ACETAMINOPHEN 500 MG
650 TABLET ORAL EVERY 6 HOURS
Refills: 0 | Status: DISCONTINUED | OUTPATIENT
Start: 2022-03-29 | End: 2022-03-31

## 2022-03-29 RX ADMIN — Medication 2 MILLIGRAM(S): at 07:05

## 2022-03-29 RX ADMIN — Medication 2.5 MILLIGRAM(S): at 18:59

## 2022-03-29 RX ADMIN — HYDROMORPHONE HYDROCHLORIDE 0.2 MILLIGRAM(S): 2 INJECTION INTRAMUSCULAR; INTRAVENOUS; SUBCUTANEOUS at 18:58

## 2022-03-29 RX ADMIN — Medication 1000 MILLIGRAM(S): at 13:27

## 2022-03-29 RX ADMIN — LEVETIRACETAM 400 MILLIGRAM(S): 250 TABLET, FILM COATED ORAL at 12:56

## 2022-03-29 RX ADMIN — Medication 50 MILLIGRAM(S): at 19:00

## 2022-03-29 RX ADMIN — PIPERACILLIN AND TAZOBACTAM 25 GRAM(S): 4; .5 INJECTION, POWDER, LYOPHILIZED, FOR SOLUTION INTRAVENOUS at 06:14

## 2022-03-29 RX ADMIN — Medication 50 MILLIGRAM(S): at 09:45

## 2022-03-29 RX ADMIN — LEVETIRACETAM 400 MILLIGRAM(S): 250 TABLET, FILM COATED ORAL at 22:07

## 2022-03-29 RX ADMIN — LACOSAMIDE 120 MILLIGRAM(S): 50 TABLET ORAL at 05:39

## 2022-03-29 RX ADMIN — Medication 2.5 MILLIGRAM(S): at 05:14

## 2022-03-29 RX ADMIN — Medication 2 MILLIGRAM(S): at 12:45

## 2022-03-29 RX ADMIN — Medication 2 MILLIGRAM(S): at 14:45

## 2022-03-29 RX ADMIN — LEVETIRACETAM 400 MILLIGRAM(S): 250 TABLET, FILM COATED ORAL at 05:15

## 2022-03-29 RX ADMIN — PIPERACILLIN AND TAZOBACTAM 25 GRAM(S): 4; .5 INJECTION, POWDER, LYOPHILIZED, FOR SOLUTION INTRAVENOUS at 12:51

## 2022-03-29 RX ADMIN — ENOXAPARIN SODIUM 90 MILLIGRAM(S): 100 INJECTION SUBCUTANEOUS at 05:15

## 2022-03-29 RX ADMIN — Medication 2.5 MILLIGRAM(S): at 23:13

## 2022-03-29 RX ADMIN — Medication 2 MILLIGRAM(S): at 07:02

## 2022-03-29 RX ADMIN — LACOSAMIDE 120 MILLIGRAM(S): 50 TABLET ORAL at 17:25

## 2022-03-29 RX ADMIN — PIPERACILLIN AND TAZOBACTAM 25 GRAM(S): 4; .5 INJECTION, POWDER, LYOPHILIZED, FOR SOLUTION INTRAVENOUS at 23:04

## 2022-03-29 RX ADMIN — Medication 50 MILLIGRAM(S): at 02:08

## 2022-03-29 RX ADMIN — Medication 400 MILLIGRAM(S): at 12:24

## 2022-03-29 RX ADMIN — Medication 2.5 MILLIGRAM(S): at 11:53

## 2022-03-29 NOTE — PROVIDER CONTACT NOTE (OTHER) - ASSESSMENT
Pt lethargic, A&OX0. Last vitals were BP:112/70 HR 60 Temp: 97.6 Respirations 18. Pt was given ativan prior and is lethargic, sleeping. Pt is also experiencing right facial twitches.

## 2022-03-29 NOTE — PROVIDER CONTACT NOTE (OTHER) - BACKGROUND
patient thew out day frequent seizure doctor were call bedside with each seizure / heartrate with afib converted to sinus rhythm /
Pt admitted for pneumonitis
Pt admitted for pneumonitis

## 2022-03-29 NOTE — PROVIDER CONTACT NOTE (OTHER) - ACTION/TREATMENT ORDERED:
see emar for medications will continue monitor patient
Neuro consult reccomended
Provider ordered another set of vitals, assessed the pt at bedside.

## 2022-03-29 NOTE — PROGRESS NOTE ADULT - ASSESSMENT
73 y/o M w/ pmh of meningioma (s/p resection 2003), HTN, HLD, advanced dementia (baseline bedbound, A&O1-2, referred by PMD to hospice), and seizure disorder (last seizure 09/2021 admitted to EMU) BIBEMS for increased secretions and tachycardia to 140s, found to be in a.fib RVR, with leukocytosis and tachycardia, meeting sepsis criteria possibly 2/2 aspiration pna. Hospital course c/b breakthrough seizures. Palliative consulted for assistance with goals of care in patient with advanced illness.

## 2022-03-29 NOTE — PROGRESS NOTE ADULT - SUBJECTIVE AND OBJECTIVE BOX
Patient is a 72y old  Male who presents with a chief complaint of tachycardia, increased secretions (28 Mar 2022 16:04)      SUBJECTIVE / OVERNIGHT EVENTS:  seizure activity yesterday around 1040AM, was given 1mg ativan, neuro saw patient and rec increasing keppra, adding vimpat, adjustments to depacon. Wife tearful at bedside, pallaitive saw patient and debating PCU/inpatient hospice   This morning had seizure activity, given 2mg ativan x2. Neuro at bedside. Cannot obtain ROS.     MEDICATIONS  (STANDING):  aspirin enteric coated 81 milliGRAM(s) Oral daily  donepezil 10 milliGRAM(s) Oral at bedtime  enoxaparin Injectable 90 milliGRAM(s) SubCutaneous every 12 hours  finasteride 5 milliGRAM(s) Oral daily  lacosamide IVPB 100 milliGRAM(s) IV Intermittent every 12 hours  lactated ringers. 1000 milliLiter(s) (75 mL/Hr) IV Continuous <Continuous>  levETIRAcetam  IVPB 1000 milliGRAM(s) IV Intermittent <User Schedule>  lisinopril 5 milliGRAM(s) Oral daily  metoprolol tartrate Injectable 2.5 milliGRAM(s) IV Push every 6 hours  piperacillin/tazobactam IVPB.. 3.375 Gram(s) IV Intermittent every 8 hours  sertraline 50 milliGRAM(s) Oral daily  simvastatin 10 milliGRAM(s) Oral at bedtime  sodium chloride 3%  Inhalation 4 milliLiter(s) Inhalation every 12 hours  valproate sodium IVPB 750 milliGRAM(s) IV Intermittent every 8 hours    MEDICATIONS  (PRN):  glycopyrrolate Injectable 0.4 milliGRAM(s) IV Push every 6 hours PRN increasing secretions  HYDROmorphone  Injectable 0.2 milliGRAM(s) IV Push every 2 hours PRN Severe Pain (7 - 10)  LORazepam   Injectable 0.5 milliGRAM(s) IV Push every 2 hours PRN Anxiety  ondansetron Injectable 4 milliGRAM(s) IV Push every 8 hours PRN Nausea and/or Vomiting      CAPILLARY BLOOD GLUCOSE      POCT Blood Glucose.: 89 mg/dL (28 Mar 2022 10:53)    I&O's Summary    28 Mar 2022 07:01  -  29 Mar 2022 07:00  --------------------------------------------------------  IN: 600 mL / OUT: 0 mL / NET: 600 mL        PHYSICAL EXAM:  Vital Signs Last 24 Hrs  T(C): 36.8 (03-29-22 @ 04:16)  T(F): 98.3 (03-29-22 @ 04:16), Max: 98.3 (03-29-22 @ 04:16)  HR: 74 (03-29-22 @ 04:16) (57 - 150)  BP: 156/87 (03-29-22 @ 04:16)  BP(mean): --  RR: 18 (03-29-22 @ 04:16) (16 - 18)  SpO2: 96% (03-29-22 @ 04:16) (92% - 100%)  Wt(kg): --    03-28 @ 07:01  -  03-29 @ 07:00  --------------------------------------------------------  IN: 600 mL / OUT: 0 mL / NET: 600 mL      Constitutional: in bed, lethargic and sleepy   EYES: eyes closed   ENT:  normal outer nose, ears   Neck: Soft and supple , no thyromegaly   Respiratory: course breath sounds and upper airways sounds bilaterally   Cardiovascular: no rubs murmurs or gallops   Gastrointestinal: Bowel Sounds present, soft, nontender, nondistended, no guarding, no rebound  Extremities: No cyanosis or clubbing; warm to touch  Neurological: withdraws to pain   Skin: No rashes  Psych: no depression or anhedonia, AAOx0  HEME: no bruises, no nose bleeds      Personally reviewed imaging, labs, EKG  LABS:                        13.7   7.23  )-----------( 91       ( 28 Mar 2022 07:25 )             39.7     03-28    144  |  100  |  18  ----------------------------<  84  4.0   |  27  |  0.94    Ca    9.1      28 Mar 2022 07:10  Phos  2.7     03-28  Mg     2.0     03-28    TPro  5.9<L>  /  Alb  3.3  /  TBili  0.9  /  DBili  x   /  AST  15  /  ALT  7<L>  /  AlkPhos  70  03-28      CARDIAC MARKERS ( 27 Mar 2022 15:01 )  x     / x     / 98 U/L / x     / 2.7 ng/mL

## 2022-03-29 NOTE — SWALLOW BEDSIDE ASSESSMENT ADULT - COMMENTS
Continued history:  Wife states that pt has had increased oral secretions along with decreased level of responsiveness in the last week. He is noted to choke and cough more as he tries to spit out mucus. Wife had a family meeting on Friday 3/25 with [facility] Dr. Elena and was referred to hospice.     In ED, pt tachycardic to 168, tele strip and ECG indicated a.fib with RVR, with leukocytosis and tachycardia, meeting sepsis criteria possibly 2/2 aspiration pna. Pt failed RN swallow screen as per chart documentation.  CT chest angio without PE, b/l basilar atelectasis and pleff     As per MD notation: “Wife expressed interest in hospice. As per discussion with outpt PMD, pt's prognosis given advanced dementia is poor and that at this time, wife and pt prefer going home with home hospice. Wife also states pt would NOT want any artificial life sustaining measures and would prefer a natural death.”    Swallow history: Pt is known to this service from 2020 and 2021.   2020 CSE: 1) Soft texture diet 2 )1:1 supervision with meals due to cognitive deficits  2021 CSE:  Soft texture

## 2022-03-29 NOTE — PROVIDER CONTACT NOTE (OTHER) - ASSESSMENT
Pt A&OX0 lethargic. Has had intermittent facial twitching but this last episode has been occurring for several minutes.

## 2022-03-29 NOTE — PROGRESS NOTE ADULT - PROBLEM SELECTOR PLAN 6
see GOC note above  DNR/DNI, no feeding tube- MOLST in chart  continue abx and blood thinners for now, will add PRNs for symptom management.

## 2022-03-29 NOTE — PROVIDER CONTACT NOTE (OTHER) - SITUATION
even note for day spoke at each time addressed with doctor/ started 750 blood gas lactate 5.7 then at 8am hr 150 / at 843 20 beats wide complex starting 10 30 am seizure started
Pt had repeated right facial twitching
Pt lethargic unable to tolerate PO meds

## 2022-03-29 NOTE — PROGRESS NOTE ADULT - PROBLEM SELECTOR PLAN 5
GOC: palliative care saw patient yesterday and patient likely will need inpatient hospice/PCU given no oral access and need for IV seizure medications       DNR DNI NO feeding tube.  ANA signed, in chart.    Hospice referral placed. WIll reach out to CM in am.

## 2022-03-29 NOTE — PROGRESS NOTE ADULT - CONVERSATION DETAILS
Met with patient's wife at bedside for ongoing goals of care. Jeannie expressed her concern that patient is in worse condition today as he has continued to have seizure activity. We discussed option for transition to PCU for symptom management and end of life care. Discussed possible transition to inpatient hospice based on clinical course. Wife agreeable to plan. All questions answered and emotional support provided.

## 2022-03-29 NOTE — SWALLOW BEDSIDE ASSESSMENT ADULT - SWALLOW EVAL: DIAGNOSIS
Pt admitted with sepsis 2/2 aspiration PNA, now with seizures; medical status declining and unlikely recover from his illness, possible hospice. As per discussion with team, SLP to be deferred at this time. Team to reconsult if service appropriate pending pt medical state. Liz Stover MS CCC-SLP pager [phone]

## 2022-03-29 NOTE — PROGRESS NOTE ADULT - SUBJECTIVE AND OBJECTIVE BOX
SUBJECTIVE AND OBJECTIVE: Pt remains lethargic/post-ictal?. Unable to provide history. Pt's wife present at bedside.   Indication for Geriatrics and Palliative Care Services: Complex decision making and symptom management  INTERVAL HPI: Pt with seizure activity this morning requiring IV ativan 2mg x2. Pt also required IV robinul x1 and IV ativan 0.5mg x1 in last 24 hours.      DNR on chart:Yes  Yes      Allergies    No Known Allergies    Intolerances    MEDICATIONS  (STANDING):  lacosamide IVPB 100 milliGRAM(s) IV Intermittent every 12 hours  lactated ringers. 1000 milliLiter(s) (75 mL/Hr) IV Continuous <Continuous>  levETIRAcetam  IVPB 1000 milliGRAM(s) IV Intermittent <User Schedule>  metoprolol tartrate Injectable 2.5 milliGRAM(s) IV Push every 6 hours  piperacillin/tazobactam IVPB.. 3.375 Gram(s) IV Intermittent every 8 hours  sodium chloride 3%  Inhalation 4 milliLiter(s) Inhalation every 12 hours  valproate sodium IVPB 750 milliGRAM(s) IV Intermittent every 8 hours    MEDICATIONS  (PRN):  acetaminophen  Suppository .. 650 milliGRAM(s) Rectal every 6 hours PRN Temp greater or equal to 38C (100.4F)  glycopyrrolate Injectable 0.4 milliGRAM(s) IV Push every 6 hours PRN increasing secretions  HYDROmorphone  Injectable 0.2 milliGRAM(s) IV Push every 2 hours PRN dyspnea  HYDROmorphone  Injectable 0.2 milliGRAM(s) IV Push every 2 hours PRN Severe Pain (7 - 10)  LORazepam   Injectable 2 milliGRAM(s) IV Push every 5 minutes PRN seizure  LORazepam   Injectable 0.5 milliGRAM(s) IV Push every 2 hours PRN Anxiety  ondansetron Injectable 4 milliGRAM(s) IV Push every 8 hours PRN Nausea and/or Vomiting      ITEMS UNCHECKED ARE NOT PRESENT    PRESENT SYMPTOMS: [x]Unable to self-report  [ ] CPOT [x] PAINADs [ ] RDOS  Source if other than patient:  [ ]Family   [x]Team     Pain: [ ]yes [x]no see PAINAD  QOL impact -   Location -                    Aggravating factors -  Quality -  Radiation -  Timing-  Severity (0-10 scale):  Minimal acceptable level (0-10 scale):     CPOT:    https://www.sccm.org/getattachment/ycl67x31-9c0d-1c9y-5r3v-4957a9629q8o/Critical-Care-Pain-Observation-Tool-(CPOT)    PAIN AD Score: 0  http://geriatrictoolkit.[facility]/cog/painad.pdf (press ctrl +  left click to view)    Dyspnea:                           [ ]Mild [ ]Moderate [ ]Severe      RDOS: 0  0 to 2  minimal or no respiratory distress   3  mild distress  4 to 6 moderate distress  >7 severe distress  https://homecareinformation.net/handouts/hen/Respiratory_Distress_Observation_Scale.pdf (Ctrl +  left click to view)     Anxiety:                             [ ]Mild [ ]Moderate [ ]Severe  Fatigue:                             [ ]Mild [ ]Moderate [ ]Severe  Nausea:                             [ ]Mild [ ]Moderate [ ]Severe  Loss of appetite:              [ ]Mild [ ]Moderate [ ]Severe  Constipation:                    [ ]Mild [ ]Moderate [ ]Severe    Other Symptoms:  [x]All other review of systems unable to obtain due to poor mentation    Palliative Performance Status Version 2:    10     %    http://npcrc.org/files/news/palliative_performance_scale_ppsv2.pdf    PHYSICAL EXAM:  Vital Signs Last 24 Hrs  T(C): 38.2 (29 Mar 2022 12:30), Max: 38.2 (29 Mar 2022 12:30)  T(F): 100.8 (29 Mar 2022 12:30), Max: 100.8 (29 Mar 2022 12:30)  HR: 93 (29 Mar 2022 12:30) (57 - 93)  BP: 168/90 (29 Mar 2022 12:30) (112/70 - 168/90)  BP(mean): --  RR: 18 (29 Mar 2022 12:30) (17 - 18)  SpO2: 98% (29 Mar 2022 12:30) (96% - 100%)    GENERAL:  [ ]Alert  [ ]Oriented  [x]Lethargic  [ ]Cachexia  [ ]Unarousable  [ ]Verbal  [x]Non-Verbal  Behavioral:   [ ]Anxiety  [ ]Delirium [ ]Agitation [ ]Other  HEENT:  [x]Normal   [ ]Dry mouth   [ ]ET Tube/Trach  [ ]Oral lesions  PULMONARY:   [ ]Clear [ ]Tachypnea  [x]Audible excessive secretions   [ ]Rhonchi        [ ]Right [ ]Left [ ]Bilateral  [ ]Crackles        [ ]Right [ ]Left [ ]Bilateral  [ ]Wheezing     [ ]Right [ ]Left [ ]Bilateral  [ ]Diminished BS [ ] Right [ ]Left [ ]Bilateral  CARDIOVASCULAR:    [x]Regular [ ]Irregular [ ]Tachy  [ ]Leo [ ]Murmur [ ]Other  GASTROINTESTINAL:  [x]Soft  [ ]Distended   [x]+BS  [x]Non tender [ ]Tender  [ ]PEG [ ]OGT/ NGT   Last BM:    GENITOURINARY:  [x]Normal [ ]Incontinent   [ ]Oliguria/Anuria   [ ]Sullivan  MUSCULOSKELETAL:   [ ]Normal   [ ]Weakness  [x]Bed/Wheelchair bound [ ]Edema  NEUROLOGIC:   [x] R orbital region twitching  [ ]No focal deficits  [x] Cognitive impairment  [ ] Dysphagia [ ]Dysarthria [ ] Paresis [ ]Other   SKIN:   [x]Normal  [ ]Rash   [ ]Pressure ulcer(s) [ ]y [ ]n present on admission    CRITICAL CARE:  [ ] Shock Present  [ ]Septic [ ]Cardiogenic [ ]Neurologic [ ]Hypovolemic  [ ]  Vasopressors [ ]  Inotropes   [ ]Respiratory failure present [ ]Mechanical ventilation [ ]Non-invasive ventilatory support [ ]High flow    [ ]Acute  [ ]Chronic [ ]Hypoxic  [ ]Hypercarbic [ ]Other  [x]Other organ failure -brain    LABS:                        13.7   7.23  )-----------( 91       ( 28 Mar 2022 07:25 )             39.7   03-28    144  |  100  |  18  ----------------------------<  84  4.0   |  27  |  0.94    Ca    9.1      28 Mar 2022 07:10  Phos  2.7     03-28  Mg     2.0     03-28    TPro  5.9<L>  /  Alb  3.3  /  TBili  0.9  /  DBili  x   /  AST  15  /  ALT  7<L>  /  AlkPhos  70  03-28      RADIOLOGY & ADDITIONAL STUDIES: No new imaging. Prior imaging reviewed.      Protein Calorie Malnutrition Present: [ ]mild [ ]moderate [ ]severe [ ]underweight [ ]morbid obesity  https://www.andeal.org/vault/3522/web/files/ONC/Table_Clinical%20Characteristics%20to%20Document%20Malnutrition-White%20JV%20et%20al%202012.pdf    Height (cm): 188 (03-27-22 @ 11:05), 188 (09-25-21 @ 11:38)  Weight (kg): 90.7 (03-27-22 @ 11:05)  BMI (kg/m2): 25.7 (03-27-22 @ 11:05)    [x]PPSV2 < or = 30%  [ ]significant weight loss [ ]poor nutritional intake [ ]anasarca[ ]Artificial Nutrition    REFERRALS:   [x]Chaplaincy  [ ]Hospice  [ ]Child Life  [x]Social Work  [ ]Case management [ ]Holistic Therapy     Goals of Care Document:

## 2022-03-29 NOTE — PROGRESS NOTE ADULT - ATTENDING COMMENTS
above plans discussed with Dr. Amor    # sepsis 2/2 aspiration PNA  # seizure disorder  # new onset afib with rvr  # advanced dementia  # advanced care planning    - pt noted with another episode of seizure this morning, s/p 2mg IV ativan x2  - pt admitted with sepsis 2/2 aspiration PNA, s/p IVF and IV zosyn  - care discussed with neurology team - in agreement with IV ativan to break the seizure prn as it is provoked in setting of sepsis  - appreciate neuro recs on AEDs: start vimpat and continue keppra, depakote  - pt fluctuates between afib and sinus rhythm on tele; [id number] at least 2; pt's wife confirmed that she wanted him to be on systemic AC  - pt was referred to home hospice but hasn't been fully evaluated at home yet; consulted palliative/hospice team here  - wife understands that pt is declining and unlikely recover from his illness; confirmed DNR/DNI code status  - pt prognosis guarded and would be a candidate for PCU    Corie Hogan MD  Division of Hospital Medicine  Contact via [employer] Teams  Office: [phone] above plans discussed with Dr. Amro    # sepsis 2/2 aspiration PNA  # seizure disorder  # new onset afib with rvr  # advanced dementia  # advanced care planning    - pt noted with another episode of seizure this morning, s/p 2mg IV ativan x2  - pt admitted with sepsis 2/2 aspiration PNA, s/p IVF and IV zosyn  - care discussed with neurology team - in agreement with IV ativan to break the seizure prn as it is provoked in setting of sepsis  - appreciate neuro recs on AEDs: start vimpat and continue keppra, depakote  - pt fluctuates between afib and sinus rhythm on tele; [id number] at least 2; pt's wife confirmed that she wanted him to be on systemic AC  - pt was referred to home hospice but hasn't been fully evaluated at home yet; consulted palliative/hospice team here  - wife understands that pt is declining and unlikely recover from his illness; confirmed DNR/DNI code status  - wife still wants him to receive IV abx, AEDs and IV pain meds for his comfort  - pt prognosis guarded and would be a candidate for PCU    Corie Hogan MD  Division of Hospital Medicine  Contact via Microsoft Teams  Office: [phone]

## 2022-03-29 NOTE — SWALLOW BEDSIDE ASSESSMENT ADULT - SLP PERTINENT HISTORY OF CURRENT PROBLEM
MAURO NUNEZ is a 72M w/ pmh of meningioma (s/p resection 2003), HTN, HLD, advanced dementia (baseline bedbound, A&O1-2, referred by PMD for hospice), and seizure disorder (last seizure 09/2021 admitted to EMU) BIBEMS for increased secretions and tachycardia to 140s.

## 2022-03-29 NOTE — PROGRESS NOTE ADULT - PROBLEM SELECTOR PLAN 2
New onset a.fib with RVR; likely 2/2 sepsis   - CHADSVASc: 2 ; HASBLED: 3    - forego TTE at request of wife   - ECG without ST elevation, trops non ischemic   - TSH WNL   - Lovenox 1mg/kg BID - per wife she is ok with AC   -metoprolol 2.5mg q6 IV push given patient cannot tolerate PO intake

## 2022-03-29 NOTE — PROGRESS NOTE ADULT - PROBLEM SELECTOR PLAN 4
Neurology recommendations appreciated:  Vimpat 100mg IV [id number]  Keppra 1g IV [id number]  VPA 750mg IV [id number]  Ativan 2mg IV [id number] prn for seizure rescue (lasting > 3 minutes)

## 2022-03-30 DIAGNOSIS — R06.00 DYSPNEA, UNSPECIFIED: ICD-10-CM

## 2022-03-30 DIAGNOSIS — R09.89 OTHER SPECIFIED SYMPTOMS AND SIGNS INVOLVING THE CIRCULATORY AND RESPIRATORY SYSTEMS: ICD-10-CM

## 2022-03-30 DIAGNOSIS — G40.419 OTHER GENERALIZED EPILEPSY AND EPILEPTIC SYNDROMES, INTRACTABLE, WITHOUT STATUS EPILEPTICUS: ICD-10-CM

## 2022-03-30 LAB
CULTURE RESULTS: SIGNIFICANT CHANGE UP
SPECIMEN SOURCE: SIGNIFICANT CHANGE UP

## 2022-03-30 PROCEDURE — ZZZZZ: CPT

## 2022-03-30 RX ORDER — ROBINUL 0.2 MG/ML
0.4 INJECTION INTRAMUSCULAR; INTRAVENOUS EVERY 6 HOURS
Refills: 0 | Status: DISCONTINUED | OUTPATIENT
Start: 2022-03-30 | End: 2022-03-31

## 2022-03-30 RX ORDER — LEVETIRACETAM 250 MG/1
1500 TABLET, FILM COATED ORAL
Refills: 0 | Status: DISCONTINUED | OUTPATIENT
Start: 2022-03-30 | End: 2022-03-31

## 2022-03-30 RX ADMIN — Medication 50 MILLIGRAM(S): at 02:16

## 2022-03-30 RX ADMIN — Medication 1 MILLIGRAM(S): at 23:06

## 2022-03-30 RX ADMIN — Medication 2.5 MILLIGRAM(S): at 05:26

## 2022-03-30 RX ADMIN — Medication 2 MILLIGRAM(S): at 04:12

## 2022-03-30 RX ADMIN — Medication 50 MILLIGRAM(S): at 09:19

## 2022-03-30 RX ADMIN — Medication 0.5 MILLIGRAM(S): at 14:45

## 2022-03-30 RX ADMIN — Medication 2.5 MILLIGRAM(S): at 12:08

## 2022-03-30 RX ADMIN — ROBINUL 0.4 MILLIGRAM(S): 0.2 INJECTION INTRAMUSCULAR; INTRAVENOUS at 12:08

## 2022-03-30 RX ADMIN — Medication 1 MILLIGRAM(S): at 17:30

## 2022-03-30 RX ADMIN — PIPERACILLIN AND TAZOBACTAM 25 GRAM(S): 4; .5 INJECTION, POWDER, LYOPHILIZED, FOR SOLUTION INTRAVENOUS at 14:23

## 2022-03-30 RX ADMIN — Medication 2.5 MILLIGRAM(S): at 17:30

## 2022-03-30 RX ADMIN — LEVETIRACETAM 400 MILLIGRAM(S): 250 TABLET, FILM COATED ORAL at 14:22

## 2022-03-30 RX ADMIN — PIPERACILLIN AND TAZOBACTAM 25 GRAM(S): 4; .5 INJECTION, POWDER, LYOPHILIZED, FOR SOLUTION INTRAVENOUS at 06:14

## 2022-03-30 RX ADMIN — ROBINUL 0.4 MILLIGRAM(S): 0.2 INJECTION INTRAMUSCULAR; INTRAVENOUS at 23:06

## 2022-03-30 RX ADMIN — Medication 2.5 MILLIGRAM(S): at 23:06

## 2022-03-30 RX ADMIN — LACOSAMIDE 120 MILLIGRAM(S): 50 TABLET ORAL at 05:15

## 2022-03-30 RX ADMIN — Medication 1 MILLIGRAM(S): at 12:08

## 2022-03-30 RX ADMIN — SODIUM CHLORIDE 4 MILLILITER(S): 9 INJECTION INTRAMUSCULAR; INTRAVENOUS; SUBCUTANEOUS at 05:26

## 2022-03-30 RX ADMIN — HYDROMORPHONE HYDROCHLORIDE 0.2 MILLIGRAM(S): 2 INJECTION INTRAMUSCULAR; INTRAVENOUS; SUBCUTANEOUS at 14:45

## 2022-03-30 RX ADMIN — ROBINUL 0.4 MILLIGRAM(S): 0.2 INJECTION INTRAMUSCULAR; INTRAVENOUS at 17:30

## 2022-03-30 RX ADMIN — Medication 2 MILLIGRAM(S): at 09:19

## 2022-03-30 RX ADMIN — Medication 50 MILLIGRAM(S): at 17:29

## 2022-03-30 RX ADMIN — LACOSAMIDE 120 MILLIGRAM(S): 50 TABLET ORAL at 17:29

## 2022-03-30 RX ADMIN — LEVETIRACETAM 400 MILLIGRAM(S): 250 TABLET, FILM COATED ORAL at 21:48

## 2022-03-30 RX ADMIN — LEVETIRACETAM 400 MILLIGRAM(S): 250 TABLET, FILM COATED ORAL at 05:49

## 2022-03-30 RX ADMIN — ROBINUL 0.4 MILLIGRAM(S): 0.2 INJECTION INTRAMUSCULAR; INTRAVENOUS at 09:19

## 2022-03-30 NOTE — PROGRESS NOTE ADULT - PROBLEM SELECTOR PLAN 7
Patient to be transferred to PCU for symptom management/end of life care when bed available. Case discussed with primary team.    For acute issues or uncontrolled symptoms please page palliative team.    Priti Martinez MD  Geriatrics and Palliative Medicine Attending  [facility] pager: [phone]     The Geriatrics and Palliative Medicine consult service has 24/7 coverage for medical recommendations, including symptom management needs.
baseline mental status A&O x1-2  bedbound at baseline   FAST 7C

## 2022-03-30 NOTE — PROGRESS NOTE ADULT - PROBLEM SELECTOR PLAN 1
likely 2/2 aspiration pneumonia   - Failed bedside dysphagia screening, likely aspiration especially in s/o increased oral secretion  - CT chest angio without PE, b/l basilar atelectasis and pleff   - bcx, ucx pending from 3/28/22   - C/w zosyn (3/27- )   - NPO until s/s eval  - f/u MRSA pcr, strep pneumo, legionella, sputum cx  - GOC per wife: ok with IV abx, lab draws, fluids
Continue with Dilaudid 0.2mg IV q1hr PRN (1 required within a 24hr period 8am-8am)
likely 2/2 aspiration pneumonia   - Failed bedside dysphagia screening, likely aspiration especially in s/o increased oral secretion  - CT chest angio without PE, b/l basilar atelectasis and pleff   - bcx, negative to date   - C/w zosyn (3/27- )   - NPO until s/s eval  - f/u MRSA pcr negative   - GOC per wife: ok with IV abx, lab draws, fluids
PPSV 10%  Pt requires assistance with all ADLs.

## 2022-03-30 NOTE — PROGRESS NOTE ADULT - PROBLEM SELECTOR PLAN 2
Patient with audible secretions. Required PRN Robinul 0.4mg IV X2 within a 24hr period  Change PRN Robinul to ATC Robinul 0.4mg IV q6hrs  Turn and position Patient with audible secretions. Required PRN Robinul 0.4mg IV X2 within a 24hr period (8am-8am)  Change PRN Robinul to ATC Robinul 0.4mg IV q6hrs  Turn and position

## 2022-03-30 NOTE — CHART NOTE - NSCHARTNOTEFT_GEN_A_CORE
Notified by RN, patient's wife stated she would like to discontinue antibiotics as she believes it will not improve patient's clinical status or prognosis. Zosyn q8h was discontinued.

## 2022-03-30 NOTE — PROGRESS NOTE ADULT - SUBJECTIVE AND OBJECTIVE BOX
GAP TEAM PALLIATIVE CARE UNIT PROGRESS NOTE:      [  ] Patient on hospice program.    INDICATION FOR PALLIATIVE CARE UNIT SERVICES/Interval HPI: symptom management in the setting of a 71y/o    OVERNIGHT EVENTS:    DNR on chart: Yes  Yes      Allergies    No Known Allergies    Intolerances    MEDICATIONS  (STANDING):  glycopyrrolate Injectable 0.4 milliGRAM(s) IV Push every 6 hours  lacosamide IVPB 100 milliGRAM(s) IV Intermittent every 12 hours  levETIRAcetam  IVPB 1500 milliGRAM(s) IV Intermittent <User Schedule>  LORazepam   Injectable 2 milliGRAM(s) IV Push every 6 hours  metoprolol tartrate Injectable 2.5 milliGRAM(s) IV Push every 6 hours  piperacillin/tazobactam IVPB.. 3.375 Gram(s) IV Intermittent every 8 hours  valproate sodium IVPB 750 milliGRAM(s) IV Intermittent every 8 hours    MEDICATIONS  (PRN):  acetaminophen  Suppository .. 650 milliGRAM(s) Rectal every 6 hours PRN Temp greater or equal to 38C (100.4F)  bisacodyl Suppository 10 milliGRAM(s) Rectal daily PRN Constipation  HYDROmorphone  Injectable 0.2 milliGRAM(s) IV Push every 1 hour PRN Severe Pain (7 - 10)  HYDROmorphone  Injectable 0.2 milliGRAM(s) IV Push every 1 hour PRN dyspnea  LORazepam   Injectable 2 milliGRAM(s) IV Push every 15 minutes PRN seizure  LORazepam   Injectable 0.5 milliGRAM(s) IV Push every 2 hours PRN Anxiety  ondansetron Injectable 4 milliGRAM(s) IV Push every 8 hours PRN Nausea and/or Vomiting    ITEMS UNCHECKED ARE NOT PRESENT    PRESENT SYMPTOMS: [ ]Unable to self-report  [ ]PAINADs [ ]RDOS  Source if other than patient:  [ ]Family   [ ]Team     Pain: [ ] yes [ ] no  QOL impact -   Location -                    Aggravating factors -  Quality -  Radiation -  Timing-  Severity (0-10 scale):  Minimal acceptable level (0-10 scale):     PAINAD Score:  [email or web address].[place].[email or web address]/cog/painad.pdf (Ctrl +  left click to view)    Dyspnea:                           [ ]Mild [ ]Moderate [ ]Severe    RDOS:  0 to 2  minimal or no respiratory distress   3  mild distress  4 to 6 moderate distress  >7 severe distress  [email or web address]/[email or web address]_Distress_Observation_Scale.pdf (Ctrl +  left click to view)     Anxiety:                             [ ]Mild [ ]Moderate [ ]Severe  Fatigue:                             [ ]Mild [ ]Moderate [ ]Severe  Nausea:                             [ ]Mild [ ]Moderate [ ]Severe  Loss of appetite:              [ ]Mild [ ]Moderate [ ]Severe  Constipation:                    [ ]Mild [ ]Moderate [ ]Severe  		  Other Symptoms:  [ ]All other review of systems negative     Palliative Performance Status Version 2:         %         [email or web address]://[email or web address]/files/[email or web address]_performance_scale_[email or web address].pdf  PHYSICAL EXAM:   Vital Signs Last 24 Hrs  T(C): 36.9 (30 Mar 2022 08:52), Max: 38.2 (29 Mar 2022 12:30)  T(F): 98.5 (30 Mar 2022 08:52), Max: 100.8 (29 Mar 2022 12:30)  HR: 81 (30 Mar 2022 08:52) (81 - 93)  BP: 129/83 (30 Mar 2022 08:52) (129/83 - 168/90)  BP(mean): --  RR: 20 (30 Mar 2022 08:52) (18 - 20)  SpO2: 95% (30 Mar 2022 08:52) (94% - 98%) I&O's Summary    29 Mar 2022 07:01  -  30 Mar 2022 07:00  --------------------------------------------------------  IN: 550 mL / OUT: 0 mL / NET: 550 mL      GENERAL: [ ] Cachexia  [ ]Alert  [ ]Oriented x   [ ]Lethargic  [ ]Unarousable  [ ]Verbal  [ ]Non-Verbal  Behavioral:   [ ] Anxiety  [ ] Delirium [ ] Agitation [ ] Other  HEENT:  [ ]Normal   [ ]Dry mouth   [ ]ET Tube/Trach  [ ]Oral lesions  PULMONARY:   [ ]Clear [ ]Tachypnea  [ ]Audible excessive secretions   [ ]Rhonchi        [ ]Right [ ]Left [ ]Bilateral  [ ]Crackles        [ ]Right [ ]Left [ ]Bilateral  [ ]Wheezing     [ ]Right [ ]Left [ ]Bilateral  [ ]Diminished BS [ ]Right [ ]Left [ ]Bilateral    CARDIOVASCULAR:    [ ]Regular [ ]Irregular [ ]Tachy  [ ]Leo [ ]Murmur [ ]Other  GASTROINTESTINAL:  [ ]Soft  [ ]Distended   [ ]+BS  [ ]Non tender [ ]Tender  [ ]Other [ ]PEG [ ]OGT/ NGT   Last BM:    GENITOURINARY:  [ ]Normal [ ] Incontinent   [ ]Oliguria/Anuria   [ ]Sullivan  MUSCULOSKELETAL:   [ ]Normal   [ ]Weakness  [ ]Bed/Wheelchair bound [ ]Edema  NEUROLOGIC:   [ ]No focal deficits  [ ] Cognitive impairment  [ ] Dysphagia [ ]Dysarthria [ ] Paresis [ ]Other   SKIN:   [ ]Normal  [ ]Rash  [ ]Other  [ ]Pressure ulcer(s)  [ ]y [ ]n  Present on admission      CRITICAL CARE:  [ ] Shock Present  [ ]Septic [ ]Cardiogenic [ ]Neurologic [ ]Hypovolemic  [ ]  Vasopressors [ ]  Inotropes   [ ] Respiratory failure present [ ] Mechanical Ventilation [ ] Non-invasive ventilatory support [ ] High-Flow  [ ] Acute  [ ] Chronic [ ] Hypoxic  [ ] Hypercarbic [ ] Other  [ ] Other organ failure     LABS:            RADIOLOGY & ADDITIONAL STUDIES:    PROTEIN CALORIE MALNUTRITION: [ ] mild [ ] moderate [ ] severe  [ ] underweight [ ] morbid obesity    https://www.andeal.org/vault/2440/web/files/ONC/Table_Clinical%20Characteristics%20to%20Document%20Malnutrition-White%20JV%20et%20al%997504.pdf    Height (cm): 188 (03-27-22 @ 11:05), 188 (09-25-21 @ 11:38)  Weight (kg): 90.7 (03-27-22 @ 11:05)  BMI (kg/m2): 25.7 (03-27-22 @ 11:05)    [ ] PPSV2 < or = 30% [ ] significant weight loss [ ] poor nutritional intake [ ] anasarca   Artificial Nutrition [ ]     REFERRALS:   [ ]Chaplaincy  [ ] Hospice  [ ]Child Life  [ ]Social Work  [ ]Case management [ ]Holistic Therapy [ ] Physical Therapy [ ] Dietary   Goals of Care Document:   GAP TEAM PALLIATIVE CARE UNIT PROGRESS NOTE:      [  ] Patient on hospice program.    INDICATION FOR PALLIATIVE CARE UNIT SERVICES/Interval HPI: symptom management in the setting of a 71y/o sepsis criteria possibly 2/2 aspiration pna. Hospital course c/b multiple seizures suspected to be triggered by infectious    OVERNIGHT EVENTS: Chart reviewed. The patient is seen and examined at the bedside. The patient required PRN Dilaudid 0.2mg IV X1 for dyspnea, PRN Robinul 0.4mg IV X2 and PRN Ativan 2mg IV X3 for seizures within a 24hr period 8am-8am.    DNR on chart: Yes  Yes      Allergies    No Known Allergies    Intolerances    MEDICATIONS  (STANDING):  glycopyrrolate Injectable 0.4 milliGRAM(s) IV Push every 6 hours  lacosamide IVPB 100 milliGRAM(s) IV Intermittent every 12 hours  levETIRAcetam  IVPB 1500 milliGRAM(s) IV Intermittent <User Schedule>  LORazepam   Injectable 2 milliGRAM(s) IV Push every 6 hours  metoprolol tartrate Injectable 2.5 milliGRAM(s) IV Push every 6 hours  piperacillin/tazobactam IVPB.. 3.375 Gram(s) IV Intermittent every 8 hours  valproate sodium IVPB 750 milliGRAM(s) IV Intermittent every 8 hours    MEDICATIONS  (PRN):  acetaminophen  Suppository .. 650 milliGRAM(s) Rectal every 6 hours PRN Temp greater or equal to 38C (100.4F)  bisacodyl Suppository 10 milliGRAM(s) Rectal daily PRN Constipation  HYDROmorphone  Injectable 0.2 milliGRAM(s) IV Push every 1 hour PRN Severe Pain (7 - 10)  HYDROmorphone  Injectable 0.2 milliGRAM(s) IV Push every 1 hour PRN dyspnea  LORazepam   Injectable 2 milliGRAM(s) IV Push every 15 minutes PRN seizure  LORazepam   Injectable 0.5 milliGRAM(s) IV Push every 2 hours PRN Anxiety  ondansetron Injectable 4 milliGRAM(s) IV Push every 8 hours PRN Nausea and/or Vomiting    ITEMS UNCHECKED ARE NOT PRESENT    PRESENT SYMPTOMS: [X ]Unable to self-report  [ ]PAINADs [ ]RDOS  Source if other than patient:  [ ]Family   [X ]Team     Pain: [ ] yes [X ] no  QOL impact -   Location -                    Aggravating factors -  Quality -  Radiation -  Timing-  Severity (0-10 scale):  Minimal acceptable level (0-10 scale):     PAINAD Score: 0  http://geriatrictoolkit.[facility]/cog/painad.pdf (Ctrl +  left click to view)    Dyspnea:                           [ ]Mild [ ]Moderate [ ]Severe    RDOS: 0  0 to 2  minimal or no respiratory distress   3  mild distress  4 to 6 moderate distress  >7 severe distress  [email or web address] [email or web address] [email or web address].net/handouts/hen/Respiratory_Distress_Observation_Scale.pdf (Ctrl +  left click to view)     Anxiety:                             [ ]Mild [ ]Moderate [ ]Severe  Fatigue:                             [ ]Mild [ ]Moderate [ ]Severe  Nausea:                             [ ]Mild [ ]Moderate [ ]Severe  Loss of appetite:              [ ]Mild [ ]Moderate [ ]Severe  Constipation:                    [ ]Mild [ ]Moderate [ ]Severe  		  Other Symptoms:  [ ]All other review of systems negative- unable to assess    Palliative Performance Status Version 2:  10  %         [email or web address]/files/[email or web address]_performance_scale_[email or web address].pdf  PHYSICAL EXAM:   Vital Signs Last 24 Hrs  T(C): 36.9 (30 Mar 2022 08:52), Max: 38.2 (29 Mar 2022 12:30)  T(F): 98.5 (30 Mar 2022 08:52), Max: 100.8 (29 Mar 2022 12:30)  HR: 81 (30 Mar 2022 08:52) (81 - 93)  BP: 129/83 (30 Mar 2022 08:52) (129/83 - 168/90)  BP(mean): --  RR: 20 (30 Mar 2022 08:52) (18 - 20)  SpO2: 95% (30 Mar 2022 08:52) (94% - 98%) I&O's Summary    29 Mar 2022 07:01  -  30 Mar 2022 07:00  --------------------------------------------------------  IN: 550 mL / OUT: 0 mL / NET: 550 mL    GENERAL: [ ] Cachexia  [ ]Alert  [ ]Oriented x   [ ]Lethargic  [X ]Unarousable  [ ]Verbal  [X ]Non-Verbal  Behavioral:   [ ] Anxiety  [ ] Delirium [ ] Agitation [ ] Other  HEENT:  [ ]Normal   [X ]Dry mouth   [ ]ET Tube/Trach  [ ]Oral lesions  PULMONARY:   [ ]Clear [ ]Tachypnea  [X ]Audible excessive secretions   [ ]Rhonchi        [ ]Right [ ]Left [ ]Bilateral  [ ]Crackles        [ ]Right [ ]Left [ ]Bilateral  [ ]Wheezing     [ ]Right [ ]Left [ ]Bilateral  [ ]Diminished BS [ ]Right [ ]Left [ ]Bilateral    CARDIOVASCULAR:    [X ]Regular [ ]Irregular [ ]Tachy  [ ]Leo [ ]Murmur [ ]Other  GASTROINTESTINAL:  [X ]Soft  [ ]Distended   [X]+BS  [X ]Non tender [ ]Tender  [ ]Other [ ]PEG [ ]OGT/ NGT   Last BM: 3/28/22  GENITOURINARY:  [ ]Normal [ X] Incontinent   [ ]Oliguria/Anuria   [ ]Sullivan [X]External cath  MUSCULOSKELETAL:   [ ]Normal   [ X]Weakness  [ X]Bed/Wheelchair bound [ ]Edema  NEUROLOGIC:   [ ]No focal deficits  [X ] Cognitive impairment  [ ] Dysphagia [ ]Dysarthria [ ] Paresis [ ]Other   SKIN: IAD,   [ ]Normal  [ ]Rash  [ ]Other  [ X]Pressure ulcer(s)  [X ]y [ ]n  Present on admission  Right buttock DTI. Please see nursing assessment for further details for which I have reviewed.     CRITICAL CARE:  [ ] Shock Present  [ ]Septic [ ]Cardiogenic [ ]Neurologic [ ]Hypovolemic  [ ]  Vasopressors [ ]  Inotropes   [ ] Respiratory failure present [ ] Mechanical Ventilation [ ] Non-invasive ventilatory support [ ] High-Flow  [ ] Acute  [ ] Chronic [ ] Hypoxic  [ ] Hypercarbic [ ] Other  [X ] Other organ failure- Brain     LABS: Reviewed    RADIOLOGY & ADDITIONAL STUDIES: Reviewed    PROTEIN CALORIE MALNUTRITION: [ ] mild [ ] moderate [ ] severe  [ ] underweight [ ] morbid obesity    https://www.andeal.org/vault/0426/web/files/ONC/Table_Clinical%20Characteristics%20to%20Document%20Malnutrition-White%20JV%20et%20al%202012.pdf    Height (cm): 188 (03-27-22 @ 11:05), 188 (09-25-21 @ 11:38)  Weight (kg): 90.7 (03-27-22 @ 11:05)  BMI (kg/m2): 25.7 (03-27-22 @ 11:05)    [ X] PPSV2 < or = 30% [ ] significant weight loss [ ] poor nutritional intake [ ] anasarca   Artificial Nutrition [ ]     REFERRALS:   [ ]Chaplaincy  [X ] Hospice  [ ]Child Life  [ X]Social Work  [ ]Case management [ ]Holistic Therapy [ ] Physical Therapy [ ] Dietary   Goals of Care Document:   GAP TEAM PALLIATIVE CARE UNIT PROGRESS NOTE:      [  ] Patient on hospice program.    INDICATION FOR PALLIATIVE CARE UNIT SERVICES/Interval HPI: symptom management in the setting of a 73y/o sepsis criteria possibly 2/2 aspiration pna. Hospital course c/b multiple seizures suspected to be triggered by infectious    OVERNIGHT EVENTS: Chart reviewed. The patient is seen and examined at the bedside. The patient required PRN Dilaudid 0.2mg IV X1 for dyspnea, PRN Robinul 0.4mg IV X2 and PRN Ativan 2mg IV X3 for seizures within a 24hr period 8am-8am.    DNR on chart: Yes  Yes      Allergies    No Known Allergies    Intolerances    MEDICATIONS  (STANDING):  glycopyrrolate Injectable 0.4 milliGRAM(s) IV Push every 6 hours  lacosamide IVPB 100 milliGRAM(s) IV Intermittent every 12 hours  levETIRAcetam  IVPB 1500 milliGRAM(s) IV Intermittent <User Schedule>  LORazepam   Injectable 2 milliGRAM(s) IV Push every 6 hours  metoprolol tartrate Injectable 2.5 milliGRAM(s) IV Push every 6 hours  piperacillin/tazobactam IVPB.. 3.375 Gram(s) IV Intermittent every 8 hours  valproate sodium IVPB 750 milliGRAM(s) IV Intermittent every 8 hours    MEDICATIONS  (PRN):  acetaminophen  Suppository .. 650 milliGRAM(s) Rectal every 6 hours PRN Temp greater or equal to 38C (100.4F)  bisacodyl Suppository 10 milliGRAM(s) Rectal daily PRN Constipation  HYDROmorphone  Injectable 0.2 milliGRAM(s) IV Push every 1 hour PRN Severe Pain (7 - 10)  HYDROmorphone  Injectable 0.2 milliGRAM(s) IV Push every 1 hour PRN dyspnea  LORazepam   Injectable 2 milliGRAM(s) IV Push every 15 minutes PRN seizure  LORazepam   Injectable 0.5 milliGRAM(s) IV Push every 2 hours PRN Anxiety  ondansetron Injectable 4 milliGRAM(s) IV Push every 8 hours PRN Nausea and/or Vomiting    ITEMS UNCHECKED ARE NOT PRESENT    PRESENT SYMPTOMS: [X ]Unable to self-report  [ ]PAINADs [ ]RDOS  Source if other than patient:  [ ]Family   [X ]Team     Pain: [ ] yes [X ] no  QOL impact -   Location -                    Aggravating factors -  Quality -  Radiation -  Timing-  Severity (0-10 scale):  Minimal acceptable level (0-10 scale):     PAINAD Score: 0  http://geriatrictoolkit.[facility]/cog/painad.pdf (Ctrl +  left click to view)    Dyspnea:                           [ ]Mild [ ]Moderate [ ]Severe    RDOS: 0  0 to 2  minimal or no respiratory distress   3  mild distress  4 to 6 moderate distress  >7 severe distress  [email or web address]/Respiratory_Distress_Observation_Scale.pdf (Ctrl +  left click to view)     Anxiety:                             [ ]Mild [ ]Moderate [ ]Severe  Fatigue:                             [ ]Mild [ ]Moderate [ ]Severe  Nausea:                             [ ]Mild [ ]Moderate [ ]Severe  Loss of appetite:              [ ]Mild [ ]Moderate [ ]Severe  Constipation:                    [ ]Mild [ ]Moderate [ ]Severe  		  Other Symptoms:  [ ]All other review of systems negative- unable to assess    Palliative Performance Status Version 2:  10  %         [email or web address]/files/news/palliative_performance_scale_[email or web address].pdf  PHYSICAL EXAM:   Vital Signs Last 24 Hrs  T(C): 36.9 (30 Mar 2022 08:52), Max: 38.2 (29 Mar 2022 12:30)  T(F): 98.5 (30 Mar 2022 08:52), Max: 100.8 (29 Mar 2022 12:30)  HR: 81 (30 Mar 2022 08:52) (81 - 93)  BP: 129/83 (30 Mar 2022 08:52) (129/83 - 168/90)  BP(mean): --  RR: 20 (30 Mar 2022 08:52) (18 - 20)  SpO2: 95% (30 Mar 2022 08:52) (94% - 98%) I&O's Summary    29 Mar 2022 07:01  -  30 Mar 2022 07:00  --------------------------------------------------------  IN: 550 mL / OUT: 0 mL / NET: 550 mL    GENERAL: [ ] Cachexia  [ ]Alert  [ ]Oriented x   [ ]Lethargic  [X ]Unarousable  [ ]Verbal  [X ]Non-Verbal  Behavioral:   [ ] Anxiety  [ ] Delirium [ ] Agitation [ ] Other  HEENT:  [ ]Normal   [X ]Dry mouth   [ ]ET Tube/Trach  [ ]Oral lesions  PULMONARY:   [ ]Clear [ ]Tachypnea  [X ]Audible excessive secretions   [ ]Rhonchi        [ ]Right [ ]Left [ ]Bilateral  [ ]Crackles        [ ]Right [ ]Left [ ]Bilateral  [ ]Wheezing     [ ]Right [ ]Left [ ]Bilateral  [ ]Diminished BS [ ]Right [ ]Left [ ]Bilateral    CARDIOVASCULAR:    [X ]Regular [ ]Irregular [ ]Tachy  [ ]Leo [ ]Murmur [ ]Other  GASTROINTESTINAL:  [X ]Soft  [ ]Distended   [X]+BS  [X ]Non tender [ ]Tender  [ ]Other [ ]PEG [ ]OGT/ NGT   Last BM: 3/28/22  GENITOURINARY:  [ ]Normal [ X] Incontinent   [ ]Oliguria/Anuria   [ ]Sullivan [X]External cath  MUSCULOSKELETAL:   [ ]Normal   [ X]Weakness  [ X]Bed/Wheelchair bound [ ]Edema  NEUROLOGIC:   [ ]No focal deficits  [X ] Cognitive impairment  [ ] Dysphagia [ ]Dysarthria [ ] Paresis [ ]Other   SKIN: IAD  [ ]Normal  [ ]Rash  [ ]Other  [ X]Pressure ulcer(s)  [X ]y [ ]n  Present on admission  Right buttock DTI. Please see nursing assessment for further details for which I have reviewed.     CRITICAL CARE:  [ ] Shock Present  [ ]Septic [ ]Cardiogenic [ ]Neurologic [ ]Hypovolemic  [ ]  Vasopressors [ ]  Inotropes   [ ] Respiratory failure present [ ] Mechanical Ventilation [ ] Non-invasive ventilatory support [ ] High-Flow  [ ] Acute  [ ] Chronic [ ] Hypoxic  [ ] Hypercarbic [ ] Other  [X ] Other organ failure- Brain     LABS: Reviewed    RADIOLOGY & ADDITIONAL STUDIES: Reviewed    PROTEIN CALORIE MALNUTRITION: [ ] mild [ ] moderate [ ] severe  [ ] underweight [ ] morbid obesity    https://www.andeal.org/vault/5861/web/files/ONC/Table_Clinical%20Characteristics%20to%20Document%20Malnutrition-White%20JV%20et%20al%202012.pdf    Height (cm): 188 (03-27-22 @ 11:05), 188 (09-25-21 @ 11:38)  Weight (kg): 90.7 (03-27-22 @ 11:05)  BMI (kg/m2): 25.7 (03-27-22 @ 11:05)    [ X] PPSV2 < or = 30% [ ] significant weight loss [ ] poor nutritional intake [ ] anasarca   Artificial Nutrition [ ]     REFERRALS:   [ ]Chaplaincy  [X ] Hospice  [ ]Child Life  [ X]Social Work  [ ]Case management [ ]Holistic Therapy [ ] Physical Therapy [ ] Dietary   Goals of Care Document:   yes

## 2022-03-30 NOTE — PROGRESS NOTE ADULT - ASSESSMENT
73 y/o M w/ pmh of meningioma (s/p resection 2003), HTN, HLD, advanced dementia (baseline bedbound, A&O1-2, referred by PMD to hospice), and seizure disorder (last seizure 09/2021 admitted to EMU) BIBEMS for increased secretions and tachycardia to 140s, found to be in a.fib RVR, with leukocytosis and tachycardia, meeting sepsis criteria possibly 2/2 aspiration pna. Hospital course c/b breakthrough seizures. Palliative consulted for assistance with goals of care in patient with advanced illness.  73 y/o M w/ pmh of meningioma (s/p resection 2003), HTN, HLD, advanced dementia (baseline bedbound, A&O1-2, referred by PMD to hospice), and seizure disorder (last seizure 09/2021 admitted to EMU) BIBEMS for increased secretions and tachycardia to 140s, found to be in a.fib RVR, with leukocytosis and tachycardia, meeting sepsis criteria possibly 2/2 aspiration pna. Hospital course c/b breakthrough seizures. Palliative consulted for assistance with goals of care in patient with advanced illness. Patient transferred to the PCU for symptom management and safe dispo planning.

## 2022-03-30 NOTE — PROGRESS NOTE ADULT - NS ATTEND AMEND GEN_ALL_CORE FT
73 y/o M w/ pmh of meningioma (s/p resection 2003), HTN, HLD, advanced dementia (baseline bedbound, A&O1-2, referred by PMD to hospice), and seizure disorder (last seizure 09/2021 admitted to EMU) BIBEMS for increased secretions and tachycardia to 140s, found to be in a.fib RVR, with leukocytosis and tachycardia, treated empirically with antibiotics, no imaging evidence of pneumonia.   Hospital course c/b breakthrough seizures.  Patient transferred to the PCU for symptom management and safe dispo planning.   Adjusting keppra due to intermittent seizures.  Hospice transition to be addressed if clinical condition permits for inpatient.  Patient assessment and plan discussed on interdisciplinary team rounds today.

## 2022-03-30 NOTE — PROGRESS NOTE ADULT - PROBLEM SELECTOR PLAN 4
Continue IV zosyn  supportive care PPSV 10%. The patient requires nursing assistance with all ADLs   Supportive care  Turn and position   Continue with good skin care

## 2022-03-30 NOTE — PROGRESS NOTE ADULT - PROBLEM SELECTOR PLAN 3
PPSV 10%. The patient requires nursing assistance with all ADLs   Supportive care  Turn and position   Continue with good skin care The patient required PRN Ativan 2mg IV X3 within a 24hr period.  Continue with Vimpat 100mg IV [id number]  Increase Keppra from 1g to 1.5g IV [id number]  Continue with VPA 750mg IV Q8hour  Continue with PRN Ativan 2mg IV [id number] for seizures  Ativan 1mg IV q6hr ATC ordered

## 2022-03-30 NOTE — DIETITIAN INITIAL EVALUATION ADULT. - OTHER INFO
Dosing wt: 199.9 lbs. Daily wt in lbs: 204.1 (3/28). Per previous RD note: 212 lbs (10/29/20). UBW unknown.    Pt NPO x4 days. Per [facility] 3/29, " transition to PCU for symptom management and end of life care." Pt with diarrhea PTA. Last BM 2/28 per flowsheet.

## 2022-03-30 NOTE — DIETITIAN INITIAL EVALUATION ADULT. - CHIEF COMPLAINT
Per Chart: Pt is a 72M w/ pmh of meningioma (s/p resection 2003), HTN, HLD, advanced dementia (baseline bedbound, A&O1-2, referred by PMD for hospice), and seizure disorder (last seizure 09/2021 admitted to EMU) BIBEMS for increased secretions and tachycardia to 140s, found to be in a.fib RVR, with leukocytosis and tachycardia, meeting sepsis criteria possibly 2/2 aspiration pna.

## 2022-03-30 NOTE — DIETITIAN INITIAL EVALUATION ADULT. - REASON INDICATOR FOR ASSESSMENT
Pt seen for inadequate diet order >=4days  Source: Medical record and RN (pt in PCU for symptom management and end of life care)

## 2022-03-30 NOTE — PROGRESS NOTE ADULT - REASON FOR ADMISSION
tachycardia, increased secretions

## 2022-03-30 NOTE — DIETITIAN INITIAL EVALUATION ADULT. - ORAL INTAKE PTA/DIET HISTORY
Unknown how pt was eating PTA; suspect poor given per H&P, "with decreased level of responsiveness in the last week." No known therapeutic diet. No known food allergies. Hx of chewing/swallowing issues. No known micronutrient or oral nutrient supplement use.

## 2022-03-30 NOTE — PROGRESS NOTE ADULT - PROBLEM SELECTOR PLAN 5
continue symptom directed care Completing course of antibiotics.  Despite presentation, CT imaging not suggestive of pneumonia.    supportive care

## 2022-03-30 NOTE — DIETITIAN INITIAL EVALUATION ADULT. - ADD RECOMMEND
Current medical condition precludes nutrition intervention at this time. Diet and micronutrients deferred to provider/GOC.

## 2022-03-30 NOTE — PROGRESS NOTE ADULT - PROBLEM SELECTOR PLAN 8
Spoke with the patient's wife Jeannie.  educated as to what to expect.  Questions answered.  Emotional support provided.  Plan is for inpatient hospice. Referral made on the medicine floor.  to follow up.  Spouse shared with me that they have been  for 38 years. They have two children. One who lives in [place] and the other one lives in [place]. They love traveling. Some of their favorite places to travel to is [place] and [place].   Will continue with care in the PCU Spoke with the patient's wife Jeannie.  educated as to what to expect.  Questions answered.  Emotional support provided.  Plan is for inpatient hospice. Referral made on the medicine floor.  to follow up.  Spouse shared with me that they have been  for 38 years. They have two children. One who lives in [place] and the other one lives in [place]. They love traveling. Some of their favorite places to travel to is [place] and [place].   Patient is Jehovah Witness   Will continue with care in the PCU

## 2022-03-30 NOTE — DIETITIAN INITIAL EVALUATION ADULT. - PROBLEM SELECTOR PLAN 1
On admission pt with leukocytosis to 13.7 and tachycardia, meeting SIRS criteria with most likely respiratory source  - Failed bedside dysphagia screening, likely aspiration especially in s/o increased oral secretion  - CT chest angio without PE, b/l basilar atelectasis and pleff   - F/u BCx. Send UA UCx   - C/w zosyn (3/27- )   - NPO until s/s eval  - mIVF, trend lactate (decreasing)   - A.fib/ tachycardia likely 2/2 sepsis (see management below)   - f/u MRSA pcr, strep pneumo, legionella, sputum cx  - GOC per wife: ok with IV abx, lab draws, fluids

## 2022-03-30 NOTE — DIETITIAN INITIAL EVALUATION ADULT. - PERTINENT MEDS FT
MEDICATIONS  (STANDING):  lacosamide IVPB 100 milliGRAM(s) IV Intermittent every 12 hours  levETIRAcetam  IVPB 1000 milliGRAM(s) IV Intermittent <User Schedule>  metoprolol tartrate Injectable 2.5 milliGRAM(s) IV Push every 6 hours  piperacillin/tazobactam IVPB.. 3.375 Gram(s) IV Intermittent every 8 hours  sodium chloride 3%  Inhalation 4 milliLiter(s) Inhalation every 12 hours  valproate sodium IVPB 750 milliGRAM(s) IV Intermittent every 8 hours

## 2022-03-31 ENCOUNTER — TRANSCRIPTION ENCOUNTER (OUTPATIENT)
Age: 72
End: 2022-03-31

## 2022-03-31 VITALS
RESPIRATION RATE: 20 BRPM | SYSTOLIC BLOOD PRESSURE: 156 MMHG | HEART RATE: 76 BPM | DIASTOLIC BLOOD PRESSURE: 85 MMHG | TEMPERATURE: 99 F | OXYGEN SATURATION: 92 %

## 2022-03-31 LAB — SARS-COV-2 RNA SPEC QL NAA+PROBE: SIGNIFICANT CHANGE UP

## 2022-03-31 PROCEDURE — 84100 ASSAY OF PHOSPHORUS: CPT

## 2022-03-31 PROCEDURE — 87640 STAPH A DNA AMP PROBE: CPT

## 2022-03-31 PROCEDURE — 82435 ASSAY OF BLOOD CHLORIDE: CPT

## 2022-03-31 PROCEDURE — 82140 ASSAY OF AMMONIA: CPT

## 2022-03-31 PROCEDURE — 82330 ASSAY OF CALCIUM: CPT

## 2022-03-31 PROCEDURE — 84145 PROCALCITONIN (PCT): CPT

## 2022-03-31 PROCEDURE — 82962 GLUCOSE BLOOD TEST: CPT

## 2022-03-31 PROCEDURE — 83690 ASSAY OF LIPASE: CPT

## 2022-03-31 PROCEDURE — 87641 MR-STAPH DNA AMP PROBE: CPT

## 2022-03-31 PROCEDURE — 83605 ASSAY OF LACTIC ACID: CPT

## 2022-03-31 PROCEDURE — 87637 SARSCOV2&INF A&B&RSV AMP PRB: CPT

## 2022-03-31 PROCEDURE — 83735 ASSAY OF MAGNESIUM: CPT

## 2022-03-31 PROCEDURE — U0003: CPT

## 2022-03-31 PROCEDURE — 99285 EMERGENCY DEPT VISIT HI MDM: CPT | Mod: 25

## 2022-03-31 PROCEDURE — 82803 BLOOD GASES ANY COMBINATION: CPT

## 2022-03-31 PROCEDURE — 87070 CULTURE OTHR SPECIMN AEROBIC: CPT

## 2022-03-31 PROCEDURE — 83880 ASSAY OF NATRIURETIC PEPTIDE: CPT

## 2022-03-31 PROCEDURE — 84484 ASSAY OF TROPONIN QUANT: CPT

## 2022-03-31 PROCEDURE — 93308 TTE F-UP OR LMTD: CPT

## 2022-03-31 PROCEDURE — 82550 ASSAY OF CK (CPK): CPT

## 2022-03-31 PROCEDURE — 71045 X-RAY EXAM CHEST 1 VIEW: CPT

## 2022-03-31 PROCEDURE — 84132 ASSAY OF SERUM POTASSIUM: CPT

## 2022-03-31 PROCEDURE — 84443 ASSAY THYROID STIM HORMONE: CPT

## 2022-03-31 PROCEDURE — 93005 ELECTROCARDIOGRAM TRACING: CPT

## 2022-03-31 PROCEDURE — U0005: CPT

## 2022-03-31 PROCEDURE — 96375 TX/PRO/DX INJ NEW DRUG ADDON: CPT

## 2022-03-31 PROCEDURE — 80164 ASSAY DIPROPYLACETIC ACD TOT: CPT

## 2022-03-31 PROCEDURE — 36415 COLL VENOUS BLD VENIPUNCTURE: CPT

## 2022-03-31 PROCEDURE — 80053 COMPREHEN METABOLIC PANEL: CPT

## 2022-03-31 PROCEDURE — 84295 ASSAY OF SERUM SODIUM: CPT

## 2022-03-31 PROCEDURE — 85014 HEMATOCRIT: CPT

## 2022-03-31 PROCEDURE — 96374 THER/PROPH/DIAG INJ IV PUSH: CPT

## 2022-03-31 PROCEDURE — 82553 CREATINE MB FRACTION: CPT

## 2022-03-31 PROCEDURE — 87040 BLOOD CULTURE FOR BACTERIA: CPT

## 2022-03-31 PROCEDURE — 85379 FIBRIN DEGRADATION QUANT: CPT

## 2022-03-31 PROCEDURE — 71275 CT ANGIOGRAPHY CHEST: CPT | Mod: MA

## 2022-03-31 PROCEDURE — 85018 HEMOGLOBIN: CPT

## 2022-03-31 PROCEDURE — 94640 AIRWAY INHALATION TREATMENT: CPT

## 2022-03-31 PROCEDURE — C9254: CPT

## 2022-03-31 PROCEDURE — 82947 ASSAY GLUCOSE BLOOD QUANT: CPT

## 2022-03-31 PROCEDURE — 80048 BASIC METABOLIC PNL TOTAL CA: CPT

## 2022-03-31 PROCEDURE — 85025 COMPLETE CBC W/AUTO DIFF WBC: CPT

## 2022-03-31 RX ORDER — ROBINUL 0.2 MG/ML
0.4 INJECTION INTRAMUSCULAR; INTRAVENOUS
Qty: 0 | Refills: 0 | DISCHARGE
Start: 2022-03-31

## 2022-03-31 RX ORDER — LISINOPRIL 2.5 MG/1
1 TABLET ORAL
Qty: 0 | Refills: 0 | DISCHARGE

## 2022-03-31 RX ORDER — HYDROMORPHONE HYDROCHLORIDE 2 MG/ML
0.2 INJECTION INTRAMUSCULAR; INTRAVENOUS; SUBCUTANEOUS
Qty: 0 | Refills: 0 | DISCHARGE
Start: 2022-03-31

## 2022-03-31 RX ORDER — LACOSAMIDE 50 MG/1
10 TABLET ORAL
Qty: 0 | Refills: 0 | DISCHARGE
Start: 2022-03-31

## 2022-03-31 RX ORDER — SERTRALINE 25 MG/1
1 TABLET, FILM COATED ORAL
Qty: 0 | Refills: 0 | DISCHARGE

## 2022-03-31 RX ORDER — DONEPEZIL HYDROCHLORIDE 10 MG/1
1 TABLET, FILM COATED ORAL
Qty: 0 | Refills: 0 | DISCHARGE

## 2022-03-31 RX ORDER — LEVETIRACETAM 250 MG/1
2 TABLET, FILM COATED ORAL
Qty: 0 | Refills: 0 | DISCHARGE

## 2022-03-31 RX ORDER — ASPIRIN/CALCIUM CARB/MAGNESIUM 324 MG
1 TABLET ORAL
Qty: 0 | Refills: 0 | DISCHARGE

## 2022-03-31 RX ORDER — METOPROLOL TARTRATE 50 MG
2.5 TABLET ORAL
Qty: 0 | Refills: 0 | DISCHARGE
Start: 2022-03-31

## 2022-03-31 RX ORDER — ACETAMINOPHEN 500 MG
1 TABLET ORAL
Qty: 0 | Refills: 0 | DISCHARGE
Start: 2022-03-31

## 2022-03-31 RX ORDER — ONDANSETRON 8 MG/1
4 TABLET, FILM COATED ORAL
Qty: 0 | Refills: 0 | DISCHARGE
Start: 2022-03-31

## 2022-03-31 RX ORDER — DIVALPROEX SODIUM 500 MG/1
3 TABLET, DELAYED RELEASE ORAL
Qty: 0 | Refills: 0 | DISCHARGE

## 2022-03-31 RX ORDER — VALPROIC ACID (AS SODIUM SALT) 250 MG/5ML
7.5 SOLUTION, ORAL ORAL
Qty: 0 | Refills: 0 | DISCHARGE
Start: 2022-03-31

## 2022-03-31 RX ORDER — LEVETIRACETAM 250 MG/1
15 TABLET, FILM COATED ORAL
Qty: 0 | Refills: 0 | DISCHARGE
Start: 2022-03-31

## 2022-03-31 RX ADMIN — Medication 50 MILLIGRAM(S): at 01:34

## 2022-03-31 RX ADMIN — Medication 2.5 MILLIGRAM(S): at 14:32

## 2022-03-31 RX ADMIN — Medication 1 MILLIGRAM(S): at 05:22

## 2022-03-31 RX ADMIN — ROBINUL 0.4 MILLIGRAM(S): 0.2 INJECTION INTRAMUSCULAR; INTRAVENOUS at 14:32

## 2022-03-31 RX ADMIN — LACOSAMIDE 120 MILLIGRAM(S): 50 TABLET ORAL at 06:28

## 2022-03-31 RX ADMIN — LEVETIRACETAM 400 MILLIGRAM(S): 250 TABLET, FILM COATED ORAL at 14:34

## 2022-03-31 RX ADMIN — Medication 2.5 MILLIGRAM(S): at 05:22

## 2022-03-31 RX ADMIN — Medication 50 MILLIGRAM(S): at 10:56

## 2022-03-31 RX ADMIN — Medication 1 MILLIGRAM(S): at 14:32

## 2022-03-31 RX ADMIN — ROBINUL 0.4 MILLIGRAM(S): 0.2 INJECTION INTRAMUSCULAR; INTRAVENOUS at 05:21

## 2022-03-31 RX ADMIN — LEVETIRACETAM 400 MILLIGRAM(S): 250 TABLET, FILM COATED ORAL at 05:21

## 2022-03-31 RX ADMIN — HYDROMORPHONE HYDROCHLORIDE 0.2 MILLIGRAM(S): 2 INJECTION INTRAMUSCULAR; INTRAVENOUS; SUBCUTANEOUS at 01:34

## 2022-03-31 NOTE — DISCHARGE NOTE PROVIDER - NSDCFUSCHEDAPPT_GEN_ALL_CORE_FT
MAURO NUNEZ ; 05/02/2022 ; [facility]  MAURO NUNEZ ; 06/21/2022 ; NPP Neuro [address] [facility]

## 2022-03-31 NOTE — DISCHARGE NOTE NURSING/CASE MANAGEMENT/SOCIAL WORK - NSDCPEFALRISK_GEN_ALL_CORE
For information on Fall & Injury Prevention, visit: [email or web address] OR  [email or web address] OR  https://www.cdc.gov/steadi/patient.html

## 2022-03-31 NOTE — DISCHARGE NOTE PROVIDER - NSDCCPCAREPLAN_GEN_ALL_CORE_FT
PRINCIPAL DISCHARGE DIAGNOSIS  Diagnosis: Pneumonia, aspiration  Assessment and Plan of Treatment: Pneumonia is a lung infection that can cause a fever, cough, and trouble breathing.  Continue all antibiotics as ordered until complete.  Nutrition is important, eat small frequent meals.  Get lots of rest and drink fluids.  Call your health care provider upon arrival home from hospital and make a follow up appointment for one week.  If your cough worsens, you develop fever greater than 101', you have shaking chills, a fast heartbeat, trouble breathing and/or feel your are breathing much faster than usual, call your healthcare provider.  Make sure you wash your hands frequently.      SECONDARY DISCHARGE DIAGNOSES  Diagnosis: Seizure disorder  Assessment and Plan of Treatment: Please continue with your care at inpatient [facility]. Take medications as prescribed      Diagnosis: Sepsis  Assessment and Plan of Treatment:     Diagnosis: Atrial fibrillation  Assessment and Plan of Treatment:

## 2022-03-31 NOTE — DISCHARGE NOTE PROVIDER - PROVIDER TOKENS
FREE:[LAST:[[facility]],FIRST:[Inpatient Hospice],PHONE:[phone]],FAX:[(   )    -],ADDRESS:[[address] [place] [id number]]]

## 2022-03-31 NOTE — DISCHARGE NOTE PROVIDER - CARE PROVIDER_API CALL
[facility], Inpatient Hospice  [address] [facility] [facility] , [place] [address]  Phone: [phone]  Fax: (   )    -  Follow Up Time:

## 2022-03-31 NOTE — DISCHARGE NOTE PROVIDER - NSDCMRMEDTOKEN_GEN_ALL_CORE_FT
aspirin 81 mg oral tablet: 1 tab(s) orally once a day  divalproex sodium 500 mg oral delayed release tablet: 2 tab(s) orally once a day  divalproex sodium 500 mg oral delayed release tablet: 3 tab(s) orally once a day (at bedtime)  donepezil 10 mg oral tablet: 1 tab(s) orally once a day (at bedtime)  finasteride 5 mg oral tablet: 1 tab(s) orally once a day  Keppra 750 mg oral tablet: 2 tab(s) orally once a day (at bedtime)  levETIRAcetam 750 mg oral tablet: 2 tab(s) orally once a day  lisinopril 5 mg oral tablet: 1 tab(s) orally once a day  simvastatin 10 mg oral tablet: 1 tab(s) orally once a day (at bedtime)  Zoloft 50 mg oral tablet: 1 tab(s) orally once a day   acetaminophen 650 mg rectal suppository: 1 suppository(ies) rectal every 6 hours, As needed, Temp greater or equal to 38C (100.4F)  bisacodyl 10 mg rectal suppository: 1 suppository(ies) rectal once a day, As needed, Constipation  glycopyrrolate 0.2 mg/mL injectable solution: 0.4 milligram(s) intravenous every 6 hours  HYDROmorphone: 0.2 milligram(s) intravenous every hour, As Needed  HYDROmorphone: 0.2 milligram(s) intravenous every hour, As Needed  lacosamide 200 mg/20 mL intravenous solution: 10 milliliter(s) intravenous every 12 hours  levETIRAcetam 100 mg/mL intravenous solution: 15 milliliter(s) intravenous every 8 hours  6am, 2pm and 10pm  LORazepam: 2 milligram(s) intravenous every 15 minutes, As Needed Stop after 3 doses  LORazepam: 0.5 milligram(s) intravenous every hour, As Needed  LORazepam: 1 milligram(s) intravenous every 6 hours  metoprolol tartrate 1 mg/mL injectable solution: 2.5 milligram(s) intravenous every 6 hours, As Needed  ondansetron 2 mg/mL injectable solution: 4 milligram(s) intravenous every 8 hours, As Needed  valproic acid (as valproate sodium) 100 mg/mL intravenous solution: 7.5 milliliter(s) intravenous every 8 hours

## 2022-03-31 NOTE — DISCHARGE NOTE NURSING/CASE MANAGEMENT/SOCIAL WORK - PATIENT PORTAL LINK FT
You can access the FollowMyHealth Patient Portal offered by [facility] by registering at the following website: [email or web address]. By joining [facility]’s FollowMyHealth portal, you will also be able to view your health information using other applications (apps) compatible with our system.

## 2022-03-31 NOTE — DISCHARGE NOTE PROVIDER - HOSPITAL COURSE
73 y/o M w/ pmh of meningioma (s/p resection 2003), HTN, HLD, advanced dementia (baseline bedbound, A&O1-2, referred by PMD to hospice), and seizure disorder BIBEMS for increased secretions and tachycardia to 140s, found to be in a.fib RVR, with leukocytosis and tachycardia, meeting sepsis criteria possibly 2/2 aspiration pna. Hospital course c/b multiple seizures suspected to be triggered by infectious process. Completed a course of IV antibiotics. The patient transferred to the PCU for symptom management. The patient is safe and stable for discharge.  discussed inpatient hospice with spouse Jeannie and she agreed to [facility]. Patient accepted to [facility] inpatient hospice. 71 y/o M w/ pmh of meningioma (s/p resection 2003), HTN, HLD, advanced dementia (baseline bedbound, A&O1-2, referred by PMD to hospice), and seizure disorder (last seizure 09/2021 admitted to EMU) BIBEMS for increased secretions and tachycardia to 140s, found to be in a.fib RVR, with leukocytosis and tachycardia, treated empirically with antibiotics, no imaging evidence of pneumonia.   Hospital course c/b breakthrough seizures.  Patient transferred to the PCU for symptom management and safe dispo planning. The patient transferred to the PCU for symptom management. The patient is safe and stable for discharge.  discussed inpatient hospice with spouse Jeannie and she agreed to [facility]. Patient accepted to [facility] inpatient hospice.

## 2022-04-01 ENCOUNTER — NON-APPOINTMENT (OUTPATIENT)
Age: 72
End: 2022-04-01

## 2022-04-05 ENCOUNTER — NON-APPOINTMENT (OUTPATIENT)
Age: 72
End: 2022-04-05

## 2022-04-06 ENCOUNTER — NON-APPOINTMENT (OUTPATIENT)
Age: 72
End: 2022-04-06

## 2022-04-06 NOTE — DISCUSSION/SUMMARY
[FreeTextEntry1] :  MAURO BRICENETO was reported as  on 2022 00:01 during hospitalization at [facility]

## 2022-05-02 ENCOUNTER — APPOINTMENT (OUTPATIENT)
Dept: INTERNAL MEDICINE | Facility: CLINIC | Age: 72
End: 2022-05-02

## 2022-06-21 ENCOUNTER — APPOINTMENT (OUTPATIENT)
Dept: NEUROLOGY | Facility: CLINIC | Age: 72
End: 2022-06-21

## 2022-10-20 NOTE — DISCHARGE NOTE NURSING/CASE MANAGEMENT/SOCIAL WORK - DATE OF LAST VACCINATION
Please let her know that I would like her to start taking Levothyroxine 25 mcg every morning (half 50 mcg tab) on an empty stomach. Will continue to monitor her thyroid function.  29-Apr-2021

## 2022-12-02 NOTE — ED ADULT NURSE NOTE - NSFALLRSKASSESSDT_ED_ALL_ED
-- DO NOT REPLY / DO NOT REPLY ALL --  -- Message is from Engagement Center Operations (ECO) --    General Patient Message: Mandi has been trying to reach the office regarding the patients health care. She has a couple of questions.    Caller Information       Type Contact Phone/Fax    12/02/2022 03:01 PM CST Phone (Incoming) Mandi (Other) [phone]     [facility]        Alternative phone number: none    Can a detailed message be left? Yes    Message Turnaround:     Is it Working Hours? Yes - Working Hours     [place]:    Please give this turnaround time to the caller:   \"This message will be sent to [state Provider's name]. The clinical team will fulfill your request as soon as they review your message.\"                 28-Oct-2020 00:43

## 2022-12-28 NOTE — PATIENT PROFILE ADULT - STATED REASON FOR ADMISSION
Returned call to MICHELE Beckman.     Per Karuna, she visited pt yesterday.   Pt reported that she had fallen over the weekend and hit her head, EMS had checked her out, but had not taken her to the hospital.   Pt's  was also in the ED for SOB, but is now back at home.   Karuna reported that she increased pt's O2 to 2L, but was unable to get a pulse ox reading d/t pt's cold fingers.     Karuna will be seeing pt tomorrow.  Karuna will check INR and coordinate with [facility].   Karuna will help pt schedule an appt to see Dr. Aponte with Hematology.   Karuna reports that the HH SW will be seeing pt on 12/30 and will evaluate if pt qualifies for Senior Services (someone to come out to check-in on pt like wellness checks, getting groceries, etc).    Karuna suggests that Dr. Bailey place a referral for their Advanced Care Program, where a provider like a NP will come out to the pt's home 1-2 times a week to assess and evaluate pt, prescribe, and help manage care at home as part of the care team with Dr. Bailey.    [facility]

## 2023-04-05 PROBLEM — F03.C0 ADVANCED DEMENTIA: Status: ACTIVE | Noted: 2022-03-09

## 2023-04-17 NOTE — ED ADULT NURSE NOTE - CAS DISCH ACCOMP BY
Respiratory/RN/EDT Keystone Flap Text: The defect edges were debeveled with a #15 scalpel blade.  Given the location of the defect, shape of the defect a keystone flap was deemed most appropriate.  Using a sterile surgical marker, an appropriate keystone flap was drawn incorporating the defect, outlining the appropriate donor tissue and placing the expected incisions within the relaxed skin tension lines where possible. The area thus outlined was incised deep to adipose tissue with a #15 scalpel blade.  The skin margins were undermined to an appropriate distance in all directions around the primary defect and laterally outward around the flap utilizing iris scissors.

## 2023-06-23 NOTE — ED PROVIDER NOTE - NS ED MD DISPO DIVISION
[Normal] : supple, no neck mass and thyroid not enlarged [Normal Neck Lymph Nodes] : normal neck lymph nodes  [Normal Supraclavicular Lymph Nodes] : normal supraclavicular lymph nodes [Normal Groin Lymph Nodes] : normal groin lymph nodes [Normal Axillary Lymph Nodes] : normal axillary lymph nodes [Normal] : oriented to person, place and time, with appropriate affect [de-identified] : bilateral reconstruction breasts healing well. drains in place. no evidence of infection.  [facility]

## 2024-01-01 NOTE — ED ADULT NURSE NOTE - NS ED NURSE LEVEL OF CONSCIOUSNESS ORIENTATION
Nonverbal Site: Sternum (21 May 2024 17:46)  Bilirubin: 6 (21 May 2024 17:46)   Site: Sternum (22 May 2024 09:17)  Bilirubin: 9.2 (22 May 2024 09:17)  Bilirubin Comment: done for visible jaundice as per PETRA Goodson NP, NP advised of result (22 May 2024 09:17)  Bilirubin: 6 (21 May 2024 17:46)  [facility] (21 May 2024 17:46)

## 2025-01-29 NOTE — PROGRESS NOTE ADULT - PROBLEM SELECTOR PLAN 3
Phone number updated [phone]   -patient with multiple seizures in last 24 hours, likely 2/2 infection   -neuro following, rec increasing keppra to 1000 q8, vimpat 100 BID, depacon 750 q8   -Rescue meds: 1) ativan 2 mg IVP if seizure lasting more than 3 minutes   2) valproate [id number] mg IVP if seizure refractory to ativan   continue neuro check, airway monitoring, seizure precaution protocol
